# Patient Record
Sex: FEMALE | Race: BLACK OR AFRICAN AMERICAN | Employment: OTHER | URBAN - METROPOLITAN AREA
[De-identification: names, ages, dates, MRNs, and addresses within clinical notes are randomized per-mention and may not be internally consistent; named-entity substitution may affect disease eponyms.]

---

## 2017-02-14 RX ORDER — ROSUVASTATIN CALCIUM 5 MG
TABLET ORAL
Qty: 90 TAB | Refills: 3 | Status: SHIPPED | OUTPATIENT
Start: 2017-02-14 | End: 2017-10-18 | Stop reason: SDUPTHER

## 2017-03-03 ENCOUNTER — OFFICE VISIT (OUTPATIENT)
Dept: FAMILY MEDICINE CLINIC | Age: 82
End: 2017-03-03

## 2017-03-03 ENCOUNTER — HOSPITAL ENCOUNTER (OUTPATIENT)
Dept: LAB | Age: 82
Discharge: HOME | End: 2017-03-03
Payer: MEDICARE

## 2017-03-03 VITALS
WEIGHT: 154 LBS | BODY MASS INDEX: 28.34 KG/M2 | HEART RATE: 52 BPM | HEIGHT: 62 IN | TEMPERATURE: 97.2 F | SYSTOLIC BLOOD PRESSURE: 137 MMHG | OXYGEN SATURATION: 98 % | RESPIRATION RATE: 16 BRPM | DIASTOLIC BLOOD PRESSURE: 60 MMHG

## 2017-03-03 DIAGNOSIS — E11.9 TYPE 2 DIABETES MELLITUS WITHOUT COMPLICATION, WITHOUT LONG-TERM CURRENT USE OF INSULIN (HCC): ICD-10-CM

## 2017-03-03 DIAGNOSIS — M54.32 BILATERAL SCIATICA: ICD-10-CM

## 2017-03-03 DIAGNOSIS — N64.59 ENGORGEMENT OF BREAST: ICD-10-CM

## 2017-03-03 DIAGNOSIS — Z99.89 OSA ON CPAP: ICD-10-CM

## 2017-03-03 DIAGNOSIS — I10 ESSENTIAL HYPERTENSION, MALIGNANT: Primary | ICD-10-CM

## 2017-03-03 DIAGNOSIS — E78.2 MIXED HYPERLIPIDEMIA: ICD-10-CM

## 2017-03-03 DIAGNOSIS — Z51.81 ENCOUNTER FOR MEDICATION MONITORING: ICD-10-CM

## 2017-03-03 DIAGNOSIS — R14.0 ABDOMINAL BLOATING: ICD-10-CM

## 2017-03-03 DIAGNOSIS — D64.9 CHRONIC ANEMIA: ICD-10-CM

## 2017-03-03 DIAGNOSIS — G47.33 OSA ON CPAP: ICD-10-CM

## 2017-03-03 DIAGNOSIS — M54.31 BILATERAL SCIATICA: ICD-10-CM

## 2017-03-03 LAB
GLUCOSE POC: 144 MG/DL
HBA1C MFR BLD HPLC: 6.6 %

## 2017-03-03 PROCEDURE — 36415 COLL VENOUS BLD VENIPUNCTURE: CPT

## 2017-03-03 PROCEDURE — 80061 LIPID PANEL: CPT

## 2017-03-03 PROCEDURE — 80053 COMPREHEN METABOLIC PANEL: CPT

## 2017-03-03 RX ORDER — PREDNISONE 10 MG/1
10 TABLET ORAL 2 TIMES DAILY
Qty: 10 TAB | Refills: 0 | Status: SHIPPED | OUTPATIENT
Start: 2017-03-03 | End: 2017-03-08

## 2017-03-03 RX ORDER — METOPROLOL TARTRATE 100 MG/1
TABLET ORAL
Refills: 3 | COMMUNITY
Start: 2016-12-21 | End: 2017-10-17 | Stop reason: SDUPTHER

## 2017-03-03 RX ORDER — TRAMADOL HYDROCHLORIDE 50 MG/1
50 TABLET ORAL
Qty: 30 TAB | Refills: 1 | Status: SHIPPED | OUTPATIENT
Start: 2017-03-03 | End: 2019-08-13

## 2017-03-03 NOTE — PROGRESS NOTES
Chief Complaint   Patient presents with    Hypertension     follow up    Diabetes     follow up         BMP 10/28/2016    A1C 10/28/2016    Lipid 6/27/16    Microalbumin 10/28/2016     Mammogram 10/20/2016    Colonoscopy 5/2/2016 by Dr. Jonathan Bonilla- repeat in 2 years    Eye exam was 5/25/2016 with Dr. Marli García at [facility].

## 2017-03-03 NOTE — MR AVS SNAPSHOT
Visit Information Date & Time Provider Department Dept. Phone Encounter #  
 3/3/2017 10:00 AM Iván Bhardwaj MD [facility] [phone] [id number] Follow-up Instructions Return in about 3 months (around 6/3/2017). Upcoming Health Maintenance Date Due  
 MEDICARE YEARLY EXAM 10/28/2016 FOOT EXAM Q1 10/28/2016 HEMOGLOBIN A1C Q6M 4/28/2017 EYE EXAM RETINAL OR DILATED Q1 5/25/2017 LIPID PANEL Q1 6/27/2017 MICROALBUMIN Q1 10/28/2017 COLONOSCOPY 5/4/2018 GLAUCOMA SCREENING Q2Y 6/30/2018 DTaP/Tdap/Td series (2 - Td) 5/1/2025 Allergies as of 3/3/2017  Review Complete On: 3/3/2017 By: Iván Bhardwaj MD  
  
 Severity Noted Reaction Type Reactions Pcn [Penicillins] High 06/01/2010    Swelling  
 tongue Bactrim [Sulfamethoxazole-trimethoprim]  06/01/2010    Diarrhea Ceftin [Cefuroxime Axetil]  06/01/2010    Diarrhea Hydralazine  07/11/2012   Intolerance Other (comments) edema Sulfa (Sulfonamide Antibiotics)  06/01/2010    Itching Current Immunizations  Reviewed on 3/3/2017 Name Date Influenza High Dose Vaccine PF 10/28/2016, 11/5/2015 Influenza Vaccine 10/22/2014, 11/1/2013 Influenza Vaccine Split 9/12/2012, 9/13/2011, 12/7/2010 Pneumococcal Conjugate (PCV-13) 5/1/2015 Pneumococcal Vaccine (Pcv) 9/21/2009 Tdap 5/1/2015 Reviewed by Iván Bhardwaj MD on 3/3/2017 at 10:40 AM  
You Were Diagnosed With   
  
 Codes Comments Type 2 diabetes mellitus without complication, without long-term current use of insulin (HCC)    -  Primary ICD-10-CM: E11.9 ICD-9-CM: 250.00 Essential hypertension, malignant     ICD-10-CM: I10 
ICD-9-CM: 401.0 Mixed hyperlipidemia     ICD-10-CM: E78.2 ICD-9-CM: 272.2 ELEAZAR on CPAP     ICD-10-CM: G47.33 
ICD-9-CM: 327.23 Chronic anemia     ICD-10-CM: D64.9 ICD-9-CM: 285.9  Encounter for medication monitoring     ICD-10-CM: Z51.81 
 ICD-9-CM: V58.83 Normal body mass index (BMI)     ICD-10-CM: Z78.9 ICD-9-CM: V49.89 Bilateral sciatica     ICD-10-CM: M54.31, M54.32 
ICD-9-CM: 724.3 Engorgement of breast     ICD-10-CM: N64.59 
ICD-9-CM: 611.79 Abdominal bloating     ICD-10-CM: R14.0 ICD-9-CM: 766. 3 Vitals BP  
  
  
  
  
  
 137/60 Vitals History BMI and BSA Data Body Mass Index Body Surface Area  
 28.17 kg/m 2 1.75 m 2 Preferred Pharmacy Pharmacy Name Phone [facility] [id number] [phone] Your Updated Medication List  
  
   
This list is accurate as of: 3/3/17 11:22 AM.  Always use your most recent med list.  
  
  
  
  
 allopurinol 100 mg tablet Commonly known as:  ZYLOPRIM  
TAKE ONE (1) TABLET(S) ONCE DAILY  
  
 amLODIPine 10 mg tablet Commonly known as:  Chowdhury Fanti TAKE ONE (1) TABLET(S) ONCE DAILY  
  
 chlorthalidone 25 mg tablet Commonly known as:  Chales Chamber Take 25 mg by mouth two (2) times a day. cloNIDine HCl 0.3 mg tablet Commonly known as:  CATAPRES  
TAKE ONE HALF (1/2) TABLET( S) DAILY AFTER LUNCH AND T LOLLY 1 TABLET EVERY NIGHT AT BEDTIME  
  
 clotrimazole-betamethasone topical cream  
Commonly known as:  Benetta Euler Apply  to affected area two (2) times a day. CRESTOR 5 mg tablet Generic drug:  rosuvastatin TAKE ONE (1) TABLET(S) BY MOUTH N IGHTLY. FISH OIL 1,000 mg Cap Generic drug:  omega-3 fatty acids-vitamin e Take 1 Tab by mouth two (2) times a day. fluticasone 50 mcg/actuation nasal spray Commonly known as:  Cody Cyphers 2 Sprays by Nasal route daily. Administer to right and left nostril.  
  
 metFORMIN  mg tablet Commonly known as:  GLUCOPHAGE XR Take 500 mg by mouth daily (with dinner). metoprolol tartrate 100 mg IR tablet Commonly known as:  LOPRESSOR  
TAKE ONE TABLET BY MOUTH TWICE A DAY  
  
 minoxidil 10 mg tablet Commonly known as:  Jeneane Misty  
 Take 5 mg by mouth daily. mometasone 0.1 % topical cream  
Commonly known as:  Sharin Tangela Apply  to affected area daily. mupirocin calcium 2 % topical cream  
Commonly known as:  [employer] Apply  to affected area two (2) times a day. polyethylene glycol 17 gram packet Commonly known as:  Ronne Diana Take 17 g by mouth daily as needed. predniSONE 10 mg tablet Commonly known as:  Tim Felling Take 1 Tab by mouth two (2) times a day for 5 days. PROCRIT INJECTION  
1,000 Units by Injection route as needed. Depending on blood count  
  
 spironolactone 25 mg tablet Commonly known as:  ALDACTONE Take 25 mg by mouth two (2) times a day. STOOL SOFTENER-LAXATIVE PO Take 1 Tab by mouth daily. traMADol 50 mg tablet Commonly known as:  ULTRAM  
Take 1 Tab by mouth every six (6) hours as needed for Pain. Max Daily Amount: 200 mg. VITAMIN B-12 100 mcg tablet Generic drug:  cyanocobalamin Take 100 mcg by mouth daily. VITAMIN C 500 mg tablet Generic drug:  ascorbic acid (vitamin C) Take 500 mg by mouth two (2) times a day. VITAMIN D3 1,000 unit tablet Generic drug:  cholecalciferol Take 1,000 Units by mouth daily. Prescriptions Printed Refills  
 traMADol (ULTRAM) 50 mg tablet 1 Sig: Take 1 Tab by mouth every six (6) hours as needed for Pain. Max Daily Amount: 200 mg. Class: Print Route: Oral  
  
Prescriptions Sent to Pharmacy Refills  
 predniSONE (DELTASONE) 10 mg tablet 0 Sig: Take 1 Tab by mouth two (2) times a day for 5 days. Class: Normal  
 Pharmacy: [facility] [place] [address], [address] Ph #: [phone] Route: Oral  
  
We Performed the Following AMB POC COMPLETE CBC, AUTOMATED [98135 CPT(R)] AMB POC GLUCOSE, QUANTITATIVE, BLOOD [63918 CPT(R)] AMB POC HEMOGLOBIN A1C [95305 CPT(R)] LIPID PANEL [67091 CPT(R)] METABOLIC PANEL, COMPREHENSIVE [07906 CPT(R)] REFERRAL TO ORTHOPEDIC SURGERY [REF62 Custom] Follow-up Instructions Return in about 3 months (around 6/3/2017). To-Do List   
 03/03/2017 Imaging:  RUPERTO MAMMO LT DX INCL CAD   
  
 03/03/2017 Imaging:  US ABD COMP   
  
 03/03/2017 Imaging:  US BREAST LT LIMITED=<3 QUAD   
  
 03/03/2017 Imaging:  XR SPINE LUMB 2 OR 3 V Referral Information Referral ID Referred By Referred To  
  
 [id number] Laura Lewis MD   
   [address] [facility], [address] Phone: [phone] Fax: [phone] Visits Status Start Date End Date 1 Authorized 3/3/17 3/3/18 If your referral has a status of pending review or denied, additional information will be sent to support the outcome of this decision. Introducing [facility]! Elle Zazueta introduces [employer] patient portal. Now you can access parts of your medical record, email your doctor's office, and request medication refills online. 1. In your internet browser, go to [email or web address] 2. Click on the First Time User? Click Here link in the Sign In box. You will see the New Member Sign Up page. 3. Enter your [employer] Access Code exactly as it appears below. You will not need to use this code after youve completed the sign-up process. If you do not sign up before the expiration date, you must request a new code. · [employer] Access Code: [place] Expires: 6/1/2017 11:22 AM 
 
4. Enter the last four digits of your Social Security Number (xxxx) and Date of Birth (mm/dd/yyyy) as indicated and click Submit. You will be taken to the next sign-up page. 5. Create a OnForce ID. This will be your [employer] login ID and cannot be changed, so think of one that is secure and easy to remember. 6. Create a [employer] password. You can change your password at any time. 7. Enter your Password Reset Question and Answer.  This can be used at a later time if you forget your password. 8. Enter your e-mail address. You will receive e-mail notification when new information is available in [address] 9. Click Sign Up. You can now view and download portions of your medical record. 10. Click the Download Summary menu link to download a portable copy of your medical information. If you have questions, please visit the Frequently Asked Questions section of the [employer] website. Remember, [employer] is NOT to be used for urgent needs. For medical emergencies, dial 911. Now available from your iPhone and Android! Please provide this summary of care documentation to your next provider. Your primary care clinician is listed as Clarice Pearson. If you have any questions after today's visit, please call [phone].

## 2017-03-04 LAB
ALBUMIN SERPL-MCNC: 4.5 G/DL (ref 3.5–4.7)
ALBUMIN/GLOB SERPL: 0.9 {RATIO} (ref 1.1–2.5)
ALP SERPL-CCNC: 51 IU/L (ref 39–117)
ALT SERPL-CCNC: 9 IU/L (ref 0–32)
AST SERPL-CCNC: 11 IU/L (ref 0–40)
BILIRUB SERPL-MCNC: 0.4 MG/DL (ref 0–1.2)
BUN SERPL-MCNC: 22 MG/DL (ref 8–27)
BUN/CREAT SERPL: 16 (ref 11–26)
CALCIUM SERPL-MCNC: 9.9 MG/DL (ref 8.7–10.3)
CHLORIDE SERPL-SCNC: 101 MMOL/L (ref 96–106)
CHOLEST SERPL-MCNC: 130 MG/DL (ref 100–199)
CO2 SERPL-SCNC: 19 MMOL/L (ref 18–29)
CREAT SERPL-MCNC: 1.39 MG/DL (ref 0.57–1)
GLOBULIN SER CALC-MCNC: 4.8 G/DL (ref 1.5–4.5)
GLUCOSE SERPL-MCNC: 131 MG/DL (ref 65–99)
HDLC SERPL-MCNC: 36 MG/DL
INTERPRETATION, 910389: NORMAL
INTERPRETATION: NORMAL
LDLC SERPL CALC-MCNC: 67 MG/DL (ref 0–99)
PDF IMAGE, 910387: NORMAL
POTASSIUM SERPL-SCNC: 4.7 MMOL/L (ref 3.5–5.2)
PROT SERPL-MCNC: 9.3 G/DL (ref 6–8.5)
SODIUM SERPL-SCNC: 134 MMOL/L (ref 134–144)
TRIGL SERPL-MCNC: 134 MG/DL (ref 0–149)
VLDLC SERPL CALC-MCNC: 27 MG/DL (ref 5–40)

## 2017-03-06 NOTE — PROGRESS NOTES
HISTORY OF PRESENT ILLNESS  Malgorzata Emmanuel is a 80 y.o. female. HPI   Follow up on chronic medical problems. Cardiovascular Review:  The patient has hypertension, ELEAZAR and hyperlipidemia. Diet and Lifestyle: generally follows a low fat low cholesterol diet, generally follows a low sodium diet, follows a diabetic diet regularly  Home BP Monitoring: is well controlled at home, ranging 130s's/80's. Continues to see Dr. Jaciel Butler at [facility] for BP. Pertinent ROS: taking medications as instructed, no medication side effects noted, no TIA's, no chest pain on exertion, no dyspnea on exertion, no swelling of ankles. Diabetes Mellitus:  She has diabetes mellitus. Diabetic ROS - diabetic diet compliance: compliant most of the time, home glucose monitoring: is performed regularly, fasting values range low 100s,  Pt does not have BS log with her today, further diabetic ROS: no polyuria or polydipsia, no chest pain, dyspnea or TIA's, no numbness, tingling or pain in extremities, no unusual visual symptoms, no hypoglycemia. Lab review: orders written for new lab studies as appropriate; see orders. Chronic anemia and multiple myeloma in remission is being followed by hematology. C/o abd bloating. Feels like stomach gets swollen. No abd pain. No nausea or vomiting. BMs normal.  Has not noticed any food trigger. Sometime stomach is so bloated that he can not fasten her pants. Has had sx over the past month. C/o low back pain for the past few weeks. Sx comes and goes. Has radiation of pain down both legs. Has had sciatica in the past but seems worse over the last few weeks. No new injury noted. Pain is 5-6/10 when at its worse. Has only been taking occassional aleve for the pain which does help. C/o left breast enlargement over the past month. Noticed d/t her bra fitting differently. No pain and has not felt any lumps. Just feel uncomfortable.         Patient Active Problem List   Diagnosis Code    Constipation K59.00    Gout M10.9    Spinal stenosis M48.00    Chronic anemia D64.9    Multiple myeloma, without mention of having achieved remission ([facility]) C90.00    Benign neoplasm of adrenal gland D35.00    Essential hypertension, malignant I10    Mixed hyperlipidemia E78.2    ELEAZAR on CPAP G47.33    Encounter for medication monitoring Z51.81    Type 2 diabetes mellitus without complication ([facility]) J64.6       Current Outpatient Prescriptions   Medication Sig Dispense Refill    metoprolol tartrate (LOPRESSOR) 100 mg IR tablet TAKE ONE TABLET BY MOUTH TWICE A DAY  3    traMADol (ULTRAM) 50 mg tablet Take 1 Tab by mouth every six (6) hours as needed for Pain. Max Daily Amount: 200 mg. 30 Tab 1    predniSONE (DELTASONE) 10 mg tablet Take 1 Tab by mouth two (2) times a day for 5 days. 10 Tab 0    CRESTOR 5 mg tablet TAKE ONE (1) TABLET(S) BY MOUTH N IGHTLY. 90 Tab 3    minoxidil (LONITEN) 10 mg tablet Take 5 mg by mouth daily.  metFORMIN ER (GLUCOPHAGE XR) 500 mg tablet Take 500 mg by mouth daily (with dinner).  cloNIDine HCl (CATAPRES) 0.3 mg tablet TAKE ONE HALF (1/2) TABLET( S) DAILY AFTER LUNCH AND T LOLLY 1 TABLET EVERY NIGHT AT BEDTIME 135 Tab 3    amLODIPine (NORVASC) 10 mg tablet TAKE ONE (1) TABLET(S) ONCE DAILY 90 Tab 3    allopurinol (ZYLOPRIM) 100 mg tablet TAKE ONE (1) TABLET(S) ONCE DAILY 90 Tab 3    polyethylene glycol (MIRALAX) 17 gram packet Take 17 g by mouth daily as needed.  cyanocobalamin (VITAMIN B-12) 100 mcg tablet Take 100 mcg by mouth daily.  EPOETIN BRYAN (PROCRIT IJ) 1,000 Units by Injection route as needed. Depending on blood count      mupirocin calcium (BACTROBAN) 2 % topical cream Apply  to affected area two (2) times a day. 30 g 0    clotrimazole-betamethasone (LOTRISONE) topical cream Apply  to affected area two (2) times a day. 45 g 1    spironolactone (ALDACTONE) 25 mg tablet Take 25 mg by mouth two (2) times a day.       chlorthalidone (HYGROTEN) 25 mg tablet Take 25 mg by mouth two (2) times a day.  mometasone (ELOCON) 0.1 % topical cream Apply  to affected area daily. 15 g 1    SENNOSIDES/DOCUSATE SODIUM (STOOL SOFTENER-LAXATIVE PO) Take 1 Tab by mouth daily.  fluticasone (FLONASE) 50 mcg/Actuation nasal spray 2 Sprays by Nasal route daily. Administer to right and left nostril. 1 Bottle 6    omega-3 fatty acids-vitamin e (FISH OIL) 1,000 mg Cap Take 1 Tab by mouth two (2) times a day.  ascorbic acid (VITAMIN C) 500 mg tablet Take 500 mg by mouth two (2) times a day.  cholecalciferol, vitamin d3, (VITAMIN D) 1,000 unit tablet Take 1,000 Units by mouth daily. Allergies   Allergen Reactions    Pcn [Penicillins] Swelling     tongue    Bactrim [Sulfamethoxazole-Trimethoprim] Diarrhea    Ceftin [Cefuroxime Axetil] Diarrhea    Hydralazine Other (comments)     edema    Sulfa (Sulfonamide Antibiotics) Itching       Past Medical History:   Diagnosis Date    Anemia NEC     Benign neoplasm of adrenal gland     Chronic anemia 6/1/2010    Constipation     on a daily stool softener which pt states helps as of 4/27/16    Diabetes ([address]     WALKER (dyspnea on exertion)     as of 4/27/16 pt states this is her baseline and is not getting any worse    Goiter 2011    as of 4/27/16 Pt states \"I am not even sure I have it\".   Pt denies any problems    Gout 6/1/2010    as of 4/27/16 pt denies any sx    HTN (hypertension) 6/1/2010    followed by cardiology Dr Haley Cedeno  [phone]    Multiple myeloma, without mention of having achieved remission ([address] Dx 11/1/99    followed by Levon Workman    Pure hypercholesterolemia 6/1/2010    Sleep apnea 6/1/2010    CPAP    Spinal stenosis 6/1/2010       Past Surgical History:   Procedure Laterality Date    HX HYSTERECTOMY  1970s    HX ORTHOPAEDIC  2005    left hand-trigger finger    HX ORTHOPAEDIC  2007    right hand- trigger finger    MT COLONOSCOPY FLX DX W/COLLJ SPEC WHEN PFRMD  08/10/2010 Alejandro Morris       Family History   Problem Relation Age of Onset    Hypertension Mother     No Known Problems Father     Cancer Sister      colon     Diabetes Sister     Kidney Disease Sister     Hypertension Sister     Elevated Lipids Sister        Social History   Substance Use Topics    Smoking status: Former Smoker     Quit date: 1/1/1980    Smokeless tobacco: Never Used    Alcohol use No        Lab Results  Component Value Date/Time   WBC 5.6 05/01/2015 09:05 AM   WBC 4.9 06/12/2012 09:40 AM   HGB 10.3 05/01/2015 09:05 AM   HCT 32.9 05/01/2015 09:05 AM   PLATELET 177 32/32/2500 09:05 AM   MCV 89 05/01/2015 09:05 AM       Lab Results  Component Value Date/Time   Cholesterol, total 130 03/03/2017 10:58 AM   HDL Cholesterol 36 03/03/2017 10:58 AM   LDL, calculated 67 03/03/2017 10:58 AM   Triglyceride 134 03/03/2017 10:58 AM   CHOL/HDL Ratio 2.8 06/02/2010 09:57 AM       Lab Results  Component Value Date/Time   TSH 2.860 05/11/2011 09:29 AM      Lab Results   Component Value Date/Time    Sodium 134 03/03/2017 10:58 AM    Potassium 4.7 03/03/2017 10:58 AM    Chloride 101 03/03/2017 10:58 AM    CO2 19 03/03/2017 10:58 AM    Anion gap 9 02/26/2010 04:04 PM    Glucose 131 03/03/2017 10:58 AM    BUN 22 03/03/2017 10:58 AM    Creatinine 1.39 03/03/2017 10:58 AM    BUN/Creatinine ratio 16 03/03/2017 10:58 AM    GFR est AA 41 03/03/2017 10:58 AM    GFR est non-AA 35 03/03/2017 10:58 AM    Calcium 9.9 03/03/2017 10:58 AM    Bilirubin, total 0.4 03/03/2017 10:58 AM    ALT (SGPT) 9 03/03/2017 10:58 AM    AST (SGOT) 11 03/03/2017 10:58 AM    Alk.  phosphatase 51 03/03/2017 10:58 AM    Protein, total 9.3 03/03/2017 10:58 AM    Albumin 4.5 03/03/2017 10:58 AM    Globulin 5.6 06/02/2010 09:57 AM    A-G Ratio 0.9 03/03/2017 10:58 AM      Lab Results   Component Value Date/Time    Hemoglobin A1c 7.4 10/28/2016 10:49 AM    Hemoglobin A1c (POC) 6.6 03/03/2017 10:58 AM    Hemoglobin A1c, External 6.4 10/14/2015         Review of Systems   Constitutional: Negative for malaise/fatigue. HENT: Negative for congestion. Eyes: Negative for blurred vision. Respiratory: Negative for cough and shortness of breath. Cardiovascular: Negative for chest pain, palpitations and leg swelling. Gastrointestinal: Negative for abdominal pain, constipation and heartburn. Genitourinary: Negative for dysuria, frequency and urgency. Musculoskeletal: Positive for back pain. Negative for joint pain. Neurological: Negative for dizziness, tingling and headaches. Endo/Heme/Allergies: Negative for environmental allergies. Psychiatric/Behavioral: Negative for depression. The patient does not have insomnia. Physical Exam   Constitutional: She appears well-developed and well-nourished. /60  Pulse (!) 52  Temp 97.2 °F (36.2 °C) (Oral)   Resp 16  Ht 5' 2\" (1.575 m)  Wt 154 lb (69.9 kg)  SpO2 98%  BMI 28.17 kg/m2     HENT:   Right Ear: Tympanic membrane and ear canal normal.   Left Ear: Tympanic membrane and ear canal normal.   Nose: No mucosal edema or rhinorrhea. Mouth/Throat: Oropharynx is clear and moist and mucous membranes are normal.   Neck: Normal range of motion. Neck supple. No thyromegaly present. Cardiovascular: Normal rate and regular rhythm. No murmur heard. Pulmonary/Chest: Effort normal and breath sounds normal. Right breast exhibits no inverted nipple, no mass, no nipple discharge, no skin change and no tenderness (left breast is larger than right. ). Left breast exhibits no inverted nipple, no mass, no nipple discharge, no skin change and no tenderness. Breasts are asymmetrical.   Abdominal: Soft. Normal appearance and bowel sounds are normal. She exhibits no distension and no ascites. There is no hepatosplenomegaly. There is no tenderness. There is no rigidity, no rebound and no guarding. No hernia. Musculoskeletal: Normal range of motion. She exhibits no edema.         Lumbar back: She exhibits tenderness and bony tenderness. She exhibits normal range of motion, no pain and no spasm. Lymphadenopathy:     She has no cervical adenopathy. She has no axillary adenopathy. Neurological: She has normal strength and normal reflexes. No sensory deficit. Coordination and gait normal.   Skin: Skin is warm and dry. Psychiatric: She has a normal mood and affect. Nursing note and vitals reviewed. ASSESSMENT and Juan Francisco Hunter was seen today for hypertension and diabetes. Diagnoses and all orders for this visit:    Essential hypertension, malignant  Stable     Type 2 diabetes mellitus without complication, without long-term current use of insulin (HCC)  -     AMB POC GLUCOSE, QUANTITATIVE, BLOOD  -     AMB POC HEMOGLOBIN A1C    Mixed hyperlipidemia  -     LIPID PANEL    ELEAZAR on CPAP    Chronic anemia  -     AMB POC COMPLETE CBC, AUTOMATED    Bilateral sciatica  -     REFERRAL TO ORTHOPEDIC SURGERY  -     XR SPINE LUMBAR 2 OR 3 VIEWS; Future  -     traMADol (ULTRAM) 50 mg tablet; Take 1 Tab by mouth every six (6) hours as needed for Pain. Max Daily Amount: 200 mg.  -     predniSONE (DELTASONE) 10 mg tablet; Take 1 Tab by mouth two (2) times a day for 5 days. Engorgement of breast  -     RUPERTO MAMMO LT DX INCL CAD; Future  -     US BREAST LT LIMITED=<3 QUAD; Future    Abdominal bloating  -     US ABD COMP; Future    Encounter for medication monitoring  -     METABOLIC PANEL, COMPREHENSIVE    Normal body mass index (BMI)        Follow-up Disposition:  Return in about 3 months (around 6/3/2017).   reviewed diet, exercise and weight control  cardiovascular risk and specific lipid/LDL goals reviewed  reviewed medications and side effects in detail  specific diabetic recommendations: low cholesterol diet, weight control and daily exercise discussed, home glucose monitoring emphasized, foot care discussed and Podiatry visits discussed, annual eye examinations at Ophthalmology discussed and glycohemoglobin and other lab monitoring discussed     I have discussed diagnosis listed in this note with pt and/or family. I have discussed treatment plans and options and the risk/benefit analysis of those options, including safe use of medications and possible medication side effects. Through the use of shared decision making we have agreed to the above plan. The patient has received an after-visit summary and questions were answered concerning future plans and follow up. Advise pt of any urgent changes then to proceed to the ER.

## 2017-03-15 RX ORDER — ALLOPURINOL 100 MG/1
TABLET ORAL
Qty: 90 TAB | Refills: 3 | Status: SHIPPED | OUTPATIENT
Start: 2017-03-15 | End: 2017-12-06 | Stop reason: SDUPTHER

## 2017-03-20 ENCOUNTER — HOSPITAL ENCOUNTER (OUTPATIENT)
Dept: ULTRASOUND IMAGING | Age: 82
Discharge: HOME | End: 2017-03-20
Attending: FAMILY MEDICINE
Payer: MEDICARE

## 2017-03-20 ENCOUNTER — HOSPITAL ENCOUNTER (OUTPATIENT)
Dept: MAMMOGRAPHY | Age: 82
Discharge: HOME | End: 2017-03-20
Attending: FAMILY MEDICINE
Payer: MEDICARE

## 2017-03-20 ENCOUNTER — APPOINTMENT (OUTPATIENT)
Dept: ULTRASOUND IMAGING | Age: 82
End: 2017-03-20
Attending: FAMILY MEDICINE
Payer: MEDICARE

## 2017-03-20 DIAGNOSIS — N64.59 ENGORGEMENT OF BREAST: ICD-10-CM

## 2017-03-20 DIAGNOSIS — R14.0 ABDOMINAL BLOATING: ICD-10-CM

## 2017-03-20 PROCEDURE — 76700 US EXAM ABDOM COMPLETE: CPT

## 2017-03-20 PROCEDURE — 77065 DX MAMMO INCL CAD UNI: CPT

## 2017-04-03 ENCOUNTER — HOSPITAL ENCOUNTER (OUTPATIENT)
Dept: GENERAL RADIOLOGY | Age: 82
Discharge: HOME | End: 2017-04-03
Attending: INTERNAL MEDICINE
Payer: MEDICARE

## 2017-04-03 DIAGNOSIS — C90.00 MULTIPLE MYELOMA (HCC): ICD-10-CM

## 2017-04-03 DIAGNOSIS — D50.9 IRON DEFICIENCY ANEMIA, UNSPECIFIED: ICD-10-CM

## 2017-04-03 PROCEDURE — 77075 RADEX OSSEOUS SURVEY COMPL: CPT

## 2017-04-05 RX ORDER — AMLODIPINE BESYLATE 10 MG/1
TABLET ORAL
Qty: 90 TAB | Refills: 3 | Status: SHIPPED | OUTPATIENT
Start: 2017-04-05 | End: 2017-12-06 | Stop reason: SDUPTHER

## 2017-04-06 ENCOUNTER — TELEPHONE (OUTPATIENT)
Dept: FAMILY MEDICINE CLINIC | Age: 82
End: 2017-04-06

## 2017-05-18 ENCOUNTER — OFFICE VISIT (OUTPATIENT)
Dept: FAMILY MEDICINE CLINIC | Age: 82
End: 2017-05-18

## 2017-05-18 VITALS — BODY MASS INDEX: 27.8 KG/M2 | SYSTOLIC BLOOD PRESSURE: 136 MMHG | WEIGHT: 152 LBS | DIASTOLIC BLOOD PRESSURE: 58 MMHG

## 2017-05-18 DIAGNOSIS — Z00.00 MEDICARE ANNUAL WELLNESS VISIT, SUBSEQUENT: Primary | ICD-10-CM

## 2017-05-18 DIAGNOSIS — Z13.31 SCREENING FOR DEPRESSION: ICD-10-CM

## 2017-05-18 DIAGNOSIS — I10 ESSENTIAL HYPERTENSION, MALIGNANT: ICD-10-CM

## 2017-05-18 DIAGNOSIS — E78.2 MIXED HYPERLIPIDEMIA: ICD-10-CM

## 2017-05-18 DIAGNOSIS — E11.9 TYPE 2 DIABETES MELLITUS WITHOUT COMPLICATION, WITHOUT LONG-TERM CURRENT USE OF INSULIN (HCC): ICD-10-CM

## 2017-05-18 DIAGNOSIS — D64.9 CHRONIC ANEMIA: ICD-10-CM

## 2017-05-18 NOTE — PATIENT INSTRUCTIONS
Repeat mammogram in October 2017     Keep eye appt, retinal eye exam on 6/9 and have records sent to us    Colonoscopy due 5/2018 for follow up on polyp removal     Make sure that you are drinking plenty of water (approx 36-48 oz) each day     Please review and discuss advanced care plan with prospective agent and bring back the completed form to our office. Contact us with any questions about the form if you have questions.

## 2017-05-18 NOTE — MR AVS SNAPSHOT
Visit Information Date & Time Provider Department Dept. Phone Encounter #  
 5/18/2017  8:00 AM Newton Washington NP [facility] [phone] [id number] Your Appointments 5/31/2017  9:00 AM  
ROUTINE CARE with Angeles Almaguer MD  
[facility]) Appt Note: 3m f/u  
 [address] [facility] [address] [phone] [address] [place] [address] [address] Upcoming Health Maintenance Date Due  
 MEDICARE YEARLY EXAM 10/28/2016 FOOT EXAM Q1 10/28/2016 EYE EXAM RETINAL OR DILATED Q1 5/25/2017 INFLUENZA AGE 9 TO ADULT 8/1/2017 HEMOGLOBIN A1C Q6M 9/3/2017 MICROALBUMIN Q1 10/28/2017 LIPID PANEL Q1 3/3/2018 COLONOSCOPY 5/4/2018 GLAUCOMA SCREENING Q2Y 6/30/2018 DTaP/Tdap/Td series (2 - Td) 5/1/2025 Allergies as of 5/18/2017  Review Complete On: 5/18/2017 By: Newton Wsahington NP Severity Noted Reaction Type Reactions Pcn [Penicillins] High 06/01/2010    Swelling  
 tongue Bactrim [Sulfamethoxazole-trimethoprim]  06/01/2010    Diarrhea Ceftin [Cefuroxime Axetil]  06/01/2010    Diarrhea Hydralazine  07/11/2012   Intolerance Other (comments) edema Sulfa (Sulfonamide Antibiotics)  06/01/2010    Itching Current Immunizations  Reviewed on 3/3/2017 Name Date Influenza High Dose Vaccine PF 10/28/2016, 11/5/2015 Influenza Vaccine 10/22/2014, 11/1/2013 Influenza Vaccine Split 9/12/2012, 9/13/2011, 12/7/2010 Pneumococcal Conjugate (PCV-13) 5/1/2015 Pneumococcal Vaccine (Pcv) 9/21/2009 Tdap 5/1/2015 Not reviewed this visit You Were Diagnosed With   
  
 Codes Comments Medicare annual wellness visit, subsequent    -  Primary ICD-10-CM: Z00.00 ICD-9-CM: V70.0 Essential hypertension, malignant     ICD-10-CM: I10 
ICD-9-CM: 401.0 Mixed hyperlipidemia     ICD-10-CM: E78.2 ICD-9-CM: 272.2 Type 2 diabetes mellitus without complication, without long-term current use of insulin (HCC)     ICD-10-CM: E11.9 ICD-9-CM: 250.00 Chronic anemia     ICD-10-CM: D64.9 ICD-9-CM: 809. 9 Vitals BP Weight(growth percentile) BMI OB Status Smoking Status 136/58 (BP 1 Location: Left arm, BP Patient Position: Sitting) 152 lb (68.9 kg) 27.8 kg/m2 Hysterectomy Former Smoker Vitals History BMI and BSA Data Body Mass Index Body Surface Area  
 27.8 kg/m 2 1.74 m 2 Preferred Pharmacy Pharmacy Name Phone [facility] [address], [address] [phone] Your Updated Medication List  
  
   
This list is accurate as of: 5/18/17  8:42 AM.  Always use your most recent med list.  
  
  
  
  
 allopurinol 100 mg tablet Commonly known as:  ZYLOPRIM  
TAKE ONE TABLET BY MOUTH EVERY DAY  
  
 amLODIPine 10 mg tablet Commonly known as:  Suzy Half TAKE ONE TABLET BY MOUTH ONCE EVERY DAY  
  
 chlorthalidone 25 mg tablet Commonly known as:  Jordis Blowers Take 25 mg by mouth two (2) times a day. cloNIDine HCl 0.3 mg tablet Commonly known as:  CATAPRES  
TAKE ONE HALF (1/2) TABLET( S) DAILY AFTER LUNCH AND T LOLLY 1 TABLET EVERY NIGHT AT BEDTIME  
  
 CRESTOR 5 mg tablet Generic drug:  rosuvastatin TAKE ONE (1) TABLET(S) BY MOUTH N IGHTLY. FISH OIL 1,000 mg Cap Generic drug:  omega-3 fatty acids-vitamin e Take 1 Tab by mouth two (2) times a day. fluticasone 50 mcg/actuation nasal spray Commonly known as:  Jonathan Bumpers 2 Sprays by Nasal route daily. Administer to right and left nostril.  
  
 metFORMIN  mg tablet Commonly known as:  GLUCOPHAGE XR Take 500 mg by mouth daily (with dinner). metoprolol tartrate 100 mg IR tablet Commonly known as:  LOPRESSOR  
TAKE ONE TABLET BY MOUTH TWICE A DAY  
  
 minoxidil 10 mg tablet Commonly known as:  Jolan Nay Take 5 mg by mouth daily. polyethylene glycol 17 gram packet Commonly known as:  Bontorito Amass Take 17 g by mouth daily as needed. PROCRIT INJECTION  
1,000 Units by Injection route as needed. Depending on blood count  
  
 spironolactone 25 mg tablet Commonly known as:  ALDACTONE Take 25 mg by mouth two (2) times a day. traMADol 50 mg tablet Commonly known as:  ULTRAM  
Take 1 Tab by mouth every six (6) hours as needed for Pain. Max Daily Amount: 200 mg. VITAMIN B-12 100 mcg tablet Generic drug:  cyanocobalamin Take 100 mcg by mouth daily. VITAMIN C 500 mg tablet Generic drug:  ascorbic acid (vitamin C) Take 500 mg by mouth two (2) times a day. VITAMIN D3 1,000 unit tablet Generic drug:  cholecalciferol Take 1,000 Units by mouth daily. Patient Instructions Repeat mammogram in October 2017 Keep eye appt, retinal eye exam on 6/9 and have records sent to us Colonoscopy due 5/2018 for follow up on polyp removal  
 
Make sure that you are drinking plenty of water (approx 36-48 oz) each day Introducing [facility] & HEALTH [facility]! [facility] introduces amSTATZ patient portal. Now you can access parts of your medical record, email your doctor's office, and request medication refills online. 1. In your internet browser, go to [email or web address] 2. Click on the First Time User? Click Here link in the Sign In box. You will see the New Member Sign Up page. 3. Enter your amSTATZ Access Code exactly as it appears below. You will not need to use this code after youve completed the sign-up process. If you do not sign up before the expiration date, you must request a new code. · amSTATZ Access Code: Rock Stone Expires: 6/1/2017 12:22 PM 
 
4. Enter the last four digits of your Social Security Number (xxxx) and Date of Birth (mm/dd/yyyy) as indicated and click Submit. You will be taken to the next sign-up page. 5. Create a Wicron ID. This will be your Wicron login ID and cannot be changed, so think of one that is secure and easy to remember. 6. Create a Wicron password. You can change your password at any time. 7. Enter your Password Reset Question and Answer. This can be used at a later time if you forget your password. 8. Enter your e-mail address. You will receive e-mail notification when new information is available in [address] 9. Click Sign Up. You can now view and download portions of your medical record. 10. Click the Download Summary menu link to download a portable copy of your medical information. If you have questions, please visit the Frequently Asked Questions section of the Wicron website. Remember, Wicron is NOT to be used for urgent needs. For medical emergencies, dial 911. Now available from your iPhone and Android! Please provide this summary of care documentation to your next provider. Your primary care clinician is listed as Donald Dickson. If you have any questions after today's visit, please call [phone].

## 2017-05-18 NOTE — Clinical Note
Thank you for the opportunity to see Ms. Amaya for her annual medicare wellness visit. She is generally up to date on her HM, but is due for her foot exam at next office visit 5/31/17. See my note for additional recommendations. Thanks!

## 2017-05-18 NOTE — ACP (ADVANCE CARE PLANNING)
Advance Care Planning (ACP) Provider Conversation Snapshot    Date of ACP Conversation: 05/18/17  Persons included in Conversation:  patient  Length of ACP Conversation in minutes:  <16 minutes (Non-Billable)    Authorized Decision Maker (if patient is incapable of making informed decisions):    This person is:   N/A          For Patients with Decision Making Capacity:   Values/Goals: Exploration of values, goals, and preferences if recovery is not expected, even with continued medical treatment in the event of:  Imminent death  Severe, permanent brain injury    Conversation Outcomes / Follow-Up Plan:   Recommended completion of Advance Directive form after review of ACP materials and conversation with prospective healthcare agent

## 2017-05-18 NOTE — PROGRESS NOTES
Carmen Blancas is a 80 y.o. female and presents for annual Medicare Wellness Visit. She is feeling well today     Had mammogram in 3/2017 because felt left breast was becoming larger. Mammo showed no mammographic evidence for left malignancy. Bilateral screening mammogram followup in October 2017 advised. Problem List: Reviewed with patient and discussed risk factors.     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    Essential hypertension, malignant I10    Mixed hyperlipidemia E78.2    ELEAZAR on CPAP G47.33, Z99.89    Encounter for medication monitoring Z51.81    Type 2 diabetes mellitus without complication ([facility] [address] C78.8       Current medical providers:  Patient Care Team:  Carla Hall MD as PCP - Yael Gallo MD (Nephrology)  Rosalia Mosqueda MD (Hematology and Oncology)  Janine Whittaker MD (Gastroenterology)    PSH: Reviewed with patient  Past Surgical History:   Procedure Laterality Date    HX HYSTERECTOMY  1970s    HX ORTHOPAEDIC  2005    left hand-trigger finger    HX ORTHOPAEDIC  2007    right hand- trigger finger    MT COLONOSCOPY FLX DX W/COLLJ SPEC WHEN PFRMD  08/10/2010    Aury Shaw        SH: Reviewed with patient  Social History   Substance Use Topics    Smoking status: Former Smoker     Quit date: 1/1/1980    Smokeless tobacco: Never Used    Alcohol use No     History   Alcohol Use No       FH: Reviewed with patient  Family History   Problem Relation Age of Onset    Hypertension Mother     No Known Problems Father     Cancer Sister 52     colon     Diabetes Sister     Kidney Disease Sister     Hypertension Sister     Elevated Lipids Sister        Medications/Allergies: Reviewed with patient  Current Outpatient Prescriptions on File Prior to Visit Medication Sig Dispense Refill    amLODIPine (NORVASC) 10 mg tablet TAKE ONE TABLET BY MOUTH ONCE EVERY DAY 90 Tab 3    allopurinol (ZYLOPRIM) 100 mg tablet TAKE ONE TABLET BY MOUTH EVERY DAY 90 Tab 3    metoprolol tartrate (LOPRESSOR) 100 mg IR tablet TAKE ONE TABLET BY MOUTH TWICE A DAY  3    CRESTOR 5 mg tablet TAKE ONE (1) TABLET(S) BY MOUTH N IGHTLY. 90 Tab 3    minoxidil (LONITEN) 10 mg tablet Take 5 mg by mouth daily.  metFORMIN ER (GLUCOPHAGE XR) 500 mg tablet Take 500 mg by mouth daily (with dinner).  cloNIDine HCl (CATAPRES) 0.3 mg tablet TAKE ONE HALF (1/2) TABLET( S) DAILY AFTER LUNCH AND T LOLLY 1 TABLET EVERY NIGHT AT BEDTIME 135 Tab 3    polyethylene glycol (MIRALAX) 17 gram packet Take 17 g by mouth daily as needed.  cyanocobalamin (VITAMIN B-12) 100 mcg tablet Take 100 mcg by mouth daily.  EPOETIN BRYAN (PROCRIT IJ) 1,000 Units by Injection route as needed. Depending on blood count      spironolactone (ALDACTONE) 25 mg tablet Take 25 mg by mouth two (2) times a day.  chlorthalidone (HYGROTEN) 25 mg tablet Take 25 mg by mouth two (2) times a day.  fluticasone (FLONASE) 50 mcg/Actuation nasal spray 2 Sprays by Nasal route daily. Administer to right and left nostril. 1 Bottle 6    omega-3 fatty acids-vitamin e (FISH OIL) 1,000 mg Cap Take 1 Tab by mouth two (2) times a day.  ascorbic acid (VITAMIN C) 500 mg tablet Take 500 mg by mouth two (2) times a day.  cholecalciferol, vitamin d3, (VITAMIN D) 1,000 unit tablet Take 1,000 Units by mouth daily.  traMADol (ULTRAM) 50 mg tablet Take 1 Tab by mouth every six (6) hours as needed for Pain. Max Daily Amount: 200 mg. 30 Tab 1     No current facility-administered medications on file prior to visit.        Allergies   Allergen Reactions    Pcn [Penicillins] Swelling     tongue    Bactrim [Sulfamethoxazole-Trimethoprim] Diarrhea    Ceftin [Cefuroxime Axetil] Diarrhea    Hydralazine Other (comments) edema    Sulfa (Sulfonamide Antibiotics) Itching       Objective:  Visit Vitals    /58 (BP 1 Location: Left arm, BP Patient Position: Sitting)    Wt 152 lb (68.9 kg)    BMI 27.8 kg/m2    Body mass index is 27.8 kg/(m^2). Emotional Health Questions   -Do you have a history of depression, anxiety, or emotional problems? no  -Over the past 2 weeks, have you felt down, depressed or hopeless? yes  -Over the past 2 weeks, have you felt little interest or pleasure in doing things? No    When pt feels down, she colors in coloring book or plays solitaire on her iPad- reports this helps her mood. 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 no  Do you exercise regularly? Yes, rides stationary bike everyday for 20 min    Alcohol Risk Factor Screening: On any occasion during the past 3 months, have you had more than 3 drinks containing alcohol? no     Do you average more than 7 drinks per week? no    Activities of Daily Living and Functional Status   -Do you need help with eating, walking, dressing, bathing, toileting, the phone, transportation, shopping, preparing meals, housework, laundry, medications or managing money? no  -Are you confident are you that you can control and manage most of your health problems? yes   Do you handle your own medications? yes  -How often do you have trouble taking your medications as prescribed? never    Fall Risk and Home Safety   Have you fallen 2 or more times in the past year? no  Does your home have rugs in the hallway, lack grab bars in the bathroom, lack handrails on the stairs or have poor lighting? yes  Do you have smoke detectors and check them regularly? yes  Do you have difficulties driving a car? No, but she prefers not to drive at night  Do you always fasten your seat belt when you are in a car? yes    Advance Care Planning:   Patient was offered the opportunity to discuss advance care planning:  yes     Does patient have an Advance Directive:  no   If no, did you provide information on Caring Connections? yes       Physical Examination     Vitals:    05/18/17 0834   BP: 136/58   Weight: 152 lb (68.9 kg)     Body mass index is 27.8 kg/(m^2).      Was the patient's timed Up & Go test unsteady or longer than 30 seconds? no      Preventive Services     (Preventive care checklist to be included in patient instructions)  Discussed today Done Previously Not Needed     X (Gmbjitt72 and PPSV23)  Pneumococcal vaccines    x  Flu vaccine     x Hepatitis B vaccine (if at risk)   x   Shingles vaccine    x  TDAP vaccine     x Mammogram     x Pap smear     x Abdominal ultrasound for AAA    X (2016, due 5/2018)  Colorectal cancer screening     x Low-dose CT for lung cancer screening    X (2015)  Bone density test    X (6/2016)  Glaucoma screening    X (3/17)  Cholesterol test    X (3/17)  Diabetes screening test      x Diabetes self-management class     x Nutritionist referral for diabetes or renal disease     Plan:      Health Maintenance   Topic Date Due    MEDICARE YEARLY EXAM  10/28/2016    FOOT EXAM Q1  10/28/2016    EYE EXAM RETINAL OR DILATED Q1  05/25/2017    INFLUENZA AGE 9 TO ADULT  08/01/2017    HEMOGLOBIN A1C Q6M  09/03/2017    MICROALBUMIN Q1  10/28/2017    LIPID PANEL Q1  03/03/2018    COLONOSCOPY  05/04/2018    GLAUCOMA SCREENING Q2Y  06/30/2018    DTaP/Tdap/Td series (2 - Td) 05/01/2025    OSTEOPOROSIS SCREENING (DEXA)  Completed    ZOSTER VACCINE AGE 60>  Addressed    Pneumococcal 65+ High/Highest Risk  Addressed       Diagnoses and all orders for this visit:    Medicare annual wellness visit, subsequent    Screening for depression    Essential hypertension, malignant    Mixed hyperlipidemia    Type 2 diabetes mellitus without complication, without long-term current use of insulin (HCC)    Chronic anemia      - PHQ2 score 1- does not desire pharmcotherapies at this time, but feels stress reduction techniques (coloring in coloring book, playing solitaire, riding stationary bike) help with feelings of sadness. Encouraged pt to reach out to daughters to help with care burden of her      - ACP not on file- \"Right to decide\" brochure and ACP form given to pt and discussed in detail in the situation pt is incapacitated to make her own decisions regarding therapies. Pt plans to discuss with prospective agent, complete form and bring back at next office visit. - Foot exam due next office visit, 5/31    - Colonscopy due 5/2018 for followup re precanerous polylps, mammo due 10/2017    - IMMZ up to date- declines zoster vax due to having shingles 3 times previously     Follow-up Disposition:  Return in about 2 weeks (around 5/31/2017) for follow up appt with Dr. Kelly Reilly. *Patient verbalized understanding and agreement with the plan. A copy of the After Visit Summary with personalized health plan was given to the patient today. Farhad Quintero NP     Patient Instructions   Repeat mammogram in October 2017     Keep eye appt, retinal eye exam on 6/9 and have records sent to us    Colonoscopy due 5/2018 for follow up on polyp removal     Make sure that you are drinking plenty of water (approx 36-48 oz) each day     Please review and discuss advanced care plan with prospective agent and bring back the completed form to our office. Contact us with any questions about the form if you have questions.

## 2017-05-31 ENCOUNTER — HOSPITAL ENCOUNTER (OUTPATIENT)
Dept: LAB | Age: 82
Discharge: HOME | End: 2017-05-31
Payer: MEDICARE

## 2017-05-31 ENCOUNTER — OFFICE VISIT (OUTPATIENT)
Dept: FAMILY MEDICINE CLINIC | Age: 82
End: 2017-05-31

## 2017-05-31 VITALS
HEART RATE: 58 BPM | HEIGHT: 62 IN | SYSTOLIC BLOOD PRESSURE: 136 MMHG | RESPIRATION RATE: 16 BRPM | WEIGHT: 150.4 LBS | BODY MASS INDEX: 27.68 KG/M2 | TEMPERATURE: 97.5 F | DIASTOLIC BLOOD PRESSURE: 50 MMHG | OXYGEN SATURATION: 98 %

## 2017-05-31 DIAGNOSIS — Z99.89 OSA ON CPAP: ICD-10-CM

## 2017-05-31 DIAGNOSIS — I10 ESSENTIAL HYPERTENSION, MALIGNANT: Primary | ICD-10-CM

## 2017-05-31 DIAGNOSIS — Z51.81 ENCOUNTER FOR MEDICATION MONITORING: ICD-10-CM

## 2017-05-31 DIAGNOSIS — D64.9 CHRONIC ANEMIA: ICD-10-CM

## 2017-05-31 DIAGNOSIS — E78.2 MIXED HYPERLIPIDEMIA: ICD-10-CM

## 2017-05-31 DIAGNOSIS — E11.9 TYPE 2 DIABETES MELLITUS WITHOUT COMPLICATION, WITHOUT LONG-TERM CURRENT USE OF INSULIN (HCC): ICD-10-CM

## 2017-05-31 DIAGNOSIS — G47.33 OSA ON CPAP: ICD-10-CM

## 2017-05-31 DIAGNOSIS — M48.061 SPINAL STENOSIS OF LUMBAR REGION: ICD-10-CM

## 2017-05-31 LAB
GLUCOSE POC: 154 MG/DL
HBA1C MFR BLD HPLC: 6.2 %

## 2017-05-31 PROCEDURE — 80048 BASIC METABOLIC PNL TOTAL CA: CPT

## 2017-05-31 PROCEDURE — 36415 COLL VENOUS BLD VENIPUNCTURE: CPT

## 2017-05-31 NOTE — PROGRESS NOTES
CMP 3/3/2017    A1C 3/3/2017    Lipid 3/3/2017    Microalbumin 10/28/2016     Mammogram 10/20/2016    Colonoscopy 5/2/2016 by Dr. Jaime Kaufman- repeat in 2 years    Eye exam was 5/25/2016 with Dr. Ami Pillai at [facility]. Patient states she has an appt 6/9/2017.

## 2017-05-31 NOTE — MR AVS SNAPSHOT
Visit Information Date & Time Provider Department Dept. Phone Encounter #  
 5/31/2017  9:00 AM Kathy AmayaCrow [phone] [id number] Follow-up Instructions Return in about 4 months (around 10/2/2017). Upcoming Health Maintenance Date Due  
 FOOT EXAM Q1 10/28/2016 EYE EXAM RETINAL OR DILATED Q1 5/25/2017 INFLUENZA AGE 9 TO ADULT 8/1/2017 HEMOGLOBIN A1C Q6M 9/3/2017 MICROALBUMIN Q1 10/28/2017 LIPID PANEL Q1 3/3/2018 COLONOSCOPY 5/4/2018 MEDICARE YEARLY EXAM 5/19/2018 GLAUCOMA SCREENING Q2Y 6/30/2018 DTaP/Tdap/Td series (2 - Td) 5/1/2025 Allergies as of 5/31/2017  Review Complete On: 5/31/2017 By: Kathy Amaya MD  
  
 Severity Noted Reaction Type Reactions Pcn [Penicillins] High 06/01/2010    Swelling  
 tongue Bactrim [Sulfamethoxazole-trimethoprim]  06/01/2010    Diarrhea Ceftin [Cefuroxime Axetil]  06/01/2010    Diarrhea Hydralazine  07/11/2012   Intolerance Other (comments) edema Sulfa (Sulfonamide Antibiotics)  06/01/2010    Itching Current Immunizations  Reviewed on 5/31/2017 Name Date Influenza High Dose Vaccine PF 10/28/2016, 11/5/2015 Influenza Vaccine 10/22/2014, 11/1/2013 Influenza Vaccine Split 9/12/2012, 9/13/2011, 12/7/2010 Pneumococcal Conjugate (PCV-13) 5/1/2015 Pneumococcal Vaccine (Pcv) 9/21/2009 Tdap 5/1/2015 Reviewed by Kathy Amaya MD on 5/31/2017 at  9:43 AM  
You Were Diagnosed With   
  
 Codes Comments Essential hypertension, malignant    -  Primary ICD-10-CM: I10 
ICD-9-CM: 401.0 Type 2 diabetes mellitus without complication, without long-term current use of insulin (HCC)     ICD-10-CM: E11.9 ICD-9-CM: 250.00 Mixed hyperlipidemia     ICD-10-CM: E78.2 ICD-9-CM: 272.2 ELEAZAR on CPAP     ICD-10-CM: G47.33, Z99.89 ICD-9-CM: 327.23, V46.8 Chronic anemia     ICD-10-CM: D64.9 ICD-9-CM: 285.9 Encounter for medication monitoring     ICD-10-CM: Z51.81 
ICD-9-CM: V58.83 Vitals BP Pulse Temp Resp Height(growth percentile) Weight(growth percentile) 136/50 (!) 58 97.5 °F (36.4 °C) (Oral) 16 5' 2\" (1.575 m) 150 lb 6.4 oz (68.2 kg) SpO2 BMI OB Status Smoking Status 98% 27.51 kg/m2 Hysterectomy Former Smoker Vitals History BMI and BSA Data Body Mass Index Body Surface Area  
 27.51 kg/m 2 1.73 m 2 Preferred Pharmacy Pharmacy Name Phone [facility] [address], [address] [phone] Your Updated Medication List  
  
   
This list is accurate as of: 5/31/17 10:11 AM.  Always use your most recent med list.  
  
  
  
  
 allopurinol 100 mg tablet Commonly known as:  ZYLOPRIM  
TAKE ONE TABLET BY MOUTH EVERY DAY  
  
 amLODIPine 10 mg tablet Commonly known as:  Yolandend Jaksch TAKE ONE TABLET BY MOUTH ONCE EVERY DAY  
  
 chlorthalidone 25 mg tablet Commonly known as:  Davi Inches Take 25 mg by mouth two (2) times a day. cloNIDine HCl 0.3 mg tablet Commonly known as:  CATAPRES  
TAKE ONE HALF (1/2) TABLET( S) DAILY AFTER LUNCH AND T LOLLY 1 TABLET EVERY NIGHT AT BEDTIME  
  
 CRESTOR 5 mg tablet Generic drug:  rosuvastatin TAKE ONE (1) TABLET(S) BY MOUTH N IGHTLY. FISH OIL 1,000 mg Cap Generic drug:  omega-3 fatty acids-vitamin e Take 1 Tab by mouth two (2) times a day. fluticasone 50 mcg/actuation nasal spray Commonly known as:  Edman Shaver 2 Sprays by Nasal route daily. Administer to right and left nostril.  
  
 metFORMIN  mg tablet Commonly known as:  GLUCOPHAGE XR Take 500 mg by mouth daily (with dinner). metoprolol tartrate 100 mg IR tablet Commonly known as:  LOPRESSOR  
TAKE ONE TABLET BY MOUTH TWICE A DAY  
  
 minoxidil 10 mg tablet Commonly known as:  Kimi Setters Take 5 mg by mouth daily. polyethylene glycol 17 gram packet Commonly known as:  Kathy Calvert Take 17 g by mouth daily as needed. PROCRIT INJECTION  
1,000 Units by Injection route every fourteen (14) days. Depending on blood count  
  
 spironolactone 25 mg tablet Commonly known as:  ALDACTONE Take 25 mg by mouth two (2) times a day. traMADol 50 mg tablet Commonly known as:  ULTRAM  
Take 1 Tab by mouth every six (6) hours as needed for Pain. Max Daily Amount: 200 mg. VITAMIN B-12 100 mcg tablet Generic drug:  cyanocobalamin Take 100 mcg by mouth daily. VITAMIN C 500 mg tablet Generic drug:  ascorbic acid (vitamin C) Take 500 mg by mouth two (2) times a day. VITAMIN D3 1,000 unit tablet Generic drug:  cholecalciferol Take 1,000 Units by mouth daily. We Performed the Following AMB POC COMPLETE CBC, AUTOMATED [01373 CPT(R)] AMB POC GLUCOSE, QUANTITATIVE, BLOOD [17583 CPT(R)] AMB POC HEMOGLOBIN A1C [08850 CPT(R)] METABOLIC PANEL, BASIC [80330 CPT(R)] Follow-up Instructions Return in about 4 months (around 10/2/2017). Introducing [facility]! Cyrus Hong introduces TextPayMe patient portal. Now you can access parts of your medical record, email your doctor's office, and request medication refills online. 1. In your internet browser, go to [email or web address] 2. Click on the First Time User? Click Here link in the Sign In box. You will see the New Member Sign Up page. 3. Enter your TextPayMe Access Code exactly as it appears below. You will not need to use this code after youve completed the sign-up process. If you do not sign up before the expiration date, you must request a new code. · TextPayMe Access Code: Rock Stone Expires: 6/1/2017 12:22 PM 
 
4. Enter the last four digits of your Social Security Number (xxxx) and Date of Birth (mm/dd/yyyy) as indicated and click Submit. You will be taken to the next sign-up page. 5. Create a Kno ID. This will be your Kno login ID and cannot be changed, so think of one that is secure and easy to remember. 6. Create a Kno password. You can change your password at any time. 7. Enter your Password Reset Question and Answer. This can be used at a later time if you forget your password. 8. Enter your e-mail address. You will receive e-mail notification when new information is available in [address] 9. Click Sign Up. You can now view and download portions of your medical record. 10. Click the Download Summary menu link to download a portable copy of your medical information. If you have questions, please visit the Frequently Asked Questions section of the Kno website. Remember, Kno is NOT to be used for urgent needs. For medical emergencies, dial 911. Now available from your iPhone and Android! Please provide this summary of care documentation to your next provider. Your primary care clinician is listed as Cristal Paulson. If you have any questions after today's visit, please call [phone].

## 2017-05-31 NOTE — PROGRESS NOTES
HISTORY OF PRESENT ILLNESS  Jamila Schrader is a 80 y.o. female. HPI   Follow up on chronic medical problems. Overall feeling good today. Cardiovascular Review:  The patient has hypertension, ELEAZAR and hyperlipidemia. Diet and Lifestyle: generally follows a low fat low cholesterol diet, generally follows a low sodium diet, follows a diabetic diet regularly  Home BP Monitoring: is well controlled at home, ranging 130s's/80's. Continues to see Dr. Ahsan Burnett at [facility] for BP. Pertinent ROS: taking medications as instructed, no medication side effects noted, no TIA's, no chest pain on exertion, no dyspnea on exertion, no swelling of ankles. Diabetes Mellitus:  She has diabetes mellitus. Diabetic ROS - diabetic diet compliance: compliant most of the time, home glucose monitoring: is performed regularly, fasting values range low 100s,  Pt does not have BS log with her today, further diabetic ROS: no polyuria or polydipsia, no chest pain, dyspnea or TIA's, no numbness, tingling or pain in extremities, no unusual visual symptoms, no hypoglycemia. Lab review: orders written for new lab studies as appropriate; see orders. Osteoarthritis:  Patient has osteoarthritis. Has spinal stenosis. Lower back pain is better after 4 weeks of PT. Having very minimal pain. Symptoms onset: problem is longstanding. Rheumatological ROS: stable, mild-to-moderate joint symptoms intermittently, reasonably well controlled by PRN meds. Response to treatment plan: stable and intermittent. Chronic anemia and multiple myeloma in remission is being followed by hematology.         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    Essential hypertension, malignant I10    Mixed hyperlipidemia E78.2    ELEAZAR on CPAP G47.33, Z99.89    Encounter for medication monitoring Z51.81    Type 2 diabetes mellitus without complication (HCC) L14.3       Current Outpatient Prescriptions   Medication Sig Dispense Refill    amLODIPine (NORVASC) 10 mg tablet TAKE ONE TABLET BY MOUTH ONCE EVERY DAY 90 Tab 3    allopurinol (ZYLOPRIM) 100 mg tablet TAKE ONE TABLET BY MOUTH EVERY DAY 90 Tab 3    metoprolol tartrate (LOPRESSOR) 100 mg IR tablet TAKE ONE TABLET BY MOUTH TWICE A DAY  3    traMADol (ULTRAM) 50 mg tablet Take 1 Tab by mouth every six (6) hours as needed for Pain. Max Daily Amount: 200 mg. 30 Tab 1    CRESTOR 5 mg tablet TAKE ONE (1) TABLET(S) BY MOUTH N IGHTLY. 90 Tab 3    minoxidil (LONITEN) 10 mg tablet Take 5 mg by mouth daily.  metFORMIN ER (GLUCOPHAGE XR) 500 mg tablet Take 500 mg by mouth daily (with dinner).  cloNIDine HCl (CATAPRES) 0.3 mg tablet TAKE ONE HALF (1/2) TABLET( S) DAILY AFTER LUNCH AND T LOLLY 1 TABLET EVERY NIGHT AT BEDTIME 135 Tab 3    polyethylene glycol (MIRALAX) 17 gram packet Take 17 g by mouth daily as needed.  cyanocobalamin (VITAMIN B-12) 100 mcg tablet Take 100 mcg by mouth daily.  EPOETIN BRYAN (PROCRIT IJ) 1,000 Units by Injection route as needed. Depending on blood count      spironolactone (ALDACTONE) 25 mg tablet Take 25 mg by mouth two (2) times a day.  chlorthalidone (HYGROTEN) 25 mg tablet Take 25 mg by mouth two (2) times a day.  fluticasone (FLONASE) 50 mcg/Actuation nasal spray 2 Sprays by Nasal route daily. Administer to right and left nostril. 1 Bottle 6    omega-3 fatty acids-vitamin e (FISH OIL) 1,000 mg Cap Take 1 Tab by mouth two (2) times a day.  ascorbic acid (VITAMIN C) 500 mg tablet Take 500 mg by mouth two (2) times a day.  cholecalciferol, vitamin d3, (VITAMIN D) 1,000 unit tablet Take 1,000 Units by mouth daily.          Allergies   Allergen Reactions    Pcn [Penicillins] Swelling     tongue    Bactrim [Sulfamethoxazole-Trimethoprim] Diarrhea    Ceftin [Cefuroxime Axetil] Diarrhea    Hydralazine Other (comments)     edema    Sulfa (Sulfonamide Antibiotics) Itching         Past Medical History:   Diagnosis Date    Anemia NEC     Benign neoplasm of adrenal gland     Chronic anemia 6/1/2010    Constipation     on a daily stool softener which pt states helps as of 4/27/16    Diabetes ([address]     WALKER (dyspnea on exertion)     as of 4/27/16 pt states this is her baseline and is not getting any worse    Goiter 2011    as of 4/27/16 Pt states \"I am not even sure I have it\".   Pt denies any problems    Gout 6/1/2010    as of 4/27/16 pt denies any sx    HTN (hypertension) 6/1/2010    followed by cardiology Dr Jacob Wells  [phone]    Multiple myeloma, without mention of having achieved remission Dx 11/1/99    followed by Wilma Shaikh    Pure hypercholesterolemia 6/1/2010    Sleep apnea 6/1/2010    CPAP    Spinal stenosis 6/1/2010         Past Surgical History:   Procedure Laterality Date    HX HYSTERECTOMY  1970s    HX ORTHOPAEDIC  2005    left hand-trigger finger    HX ORTHOPAEDIC  2007    right hand- trigger finger    MS COLONOSCOPY FLX DX W/COLLJ SPEC WHEN PFRMD  08/10/2010    Linh Paredes         Family History   Problem Relation Age of Onset    Hypertension Mother     No Known Problems Father     Cancer Sister 52     colon     Diabetes Sister     Kidney Disease Sister     Hypertension Sister     Elevated Lipids Sister        Social History   Substance Use Topics    Smoking status: Former Smoker     Quit date: 1/1/1980    Smokeless tobacco: Never Used    Alcohol use No        Lab Results   Component Value Date/Time    WBC 5.6 05/01/2015 09:05 AM    WBC 4.9 06/12/2012 09:40 AM    HGB 10.3 05/01/2015 09:05 AM    HCT 32.9 05/01/2015 09:05 AM    PLATELET 142 55/51/7942 09:05 AM    MCV 89 05/01/2015 09:05 AM       Lab Results   Component Value Date/Time    Cholesterol, total 130 03/03/2017 10:58 AM    HDL Cholesterol 36 03/03/2017 10:58 AM LDL, calculated 67 03/03/2017 10:58 AM    Triglyceride 134 03/03/2017 10:58 AM    CHOL/HDL Ratio 2.8 06/02/2010 09:57 AM       Lab Results   Component Value Date/Time    TSH 2.860 05/11/2011 09:29 AM      Lab Results   Component Value Date/Time    Sodium 134 03/03/2017 10:58 AM    Potassium 4.7 03/03/2017 10:58 AM    Chloride 101 03/03/2017 10:58 AM    CO2 19 03/03/2017 10:58 AM    Anion gap 9 02/26/2010 04:04 PM    Glucose 131 03/03/2017 10:58 AM    BUN 22 03/03/2017 10:58 AM    Creatinine 1.39 03/03/2017 10:58 AM    BUN/Creatinine ratio 16 03/03/2017 10:58 AM    GFR est AA 41 03/03/2017 10:58 AM    GFR est non-AA 35 03/03/2017 10:58 AM    Calcium 9.9 03/03/2017 10:58 AM    Bilirubin, total 0.4 03/03/2017 10:58 AM    ALT (SGPT) 9 03/03/2017 10:58 AM    AST (SGOT) 11 03/03/2017 10:58 AM    Alk. phosphatase 51 03/03/2017 10:58 AM    Protein, total 9.3 03/03/2017 10:58 AM    Albumin 4.5 03/03/2017 10:58 AM    Globulin 5.6 06/02/2010 09:57 AM    A-G Ratio 0.9 03/03/2017 10:58 AM      Lab Results   Component Value Date/Time    Hemoglobin A1c 7.4 10/28/2016 10:49 AM    Hemoglobin A1c (POC) 6.6 03/03/2017 10:58 AM    Hemoglobin A1c, External 6.4 10/14/2015         Review of Systems   Constitutional: Negative for malaise/fatigue. HENT: Negative for congestion. Eyes: Negative for blurred vision. Respiratory: Negative for cough and shortness of breath. Cardiovascular: Negative for chest pain, palpitations and leg swelling. Gastrointestinal: Negative for abdominal pain, constipation and heartburn. Genitourinary: Negative for dysuria, frequency and urgency. Musculoskeletal: Negative for joint pain. Neurological: Negative for dizziness, tingling and headaches. Endo/Heme/Allergies: Negative for environmental allergies. Psychiatric/Behavioral: Negative for depression. The patient does not have insomnia. Physical Exam   Constitutional: She appears well-developed and well-nourished.    /50  Pulse (!) 58  Temp 97.5 °F (36.4 °C) (Oral)   Resp 16  Ht 5' 2\" (1.575 m)  Wt 150 lb 6.4 oz (68.2 kg)  SpO2 98%  BMI 27.51 kg/m2     HENT:   Right Ear: Tympanic membrane and ear canal normal.   Left Ear: Tympanic membrane and ear canal normal.   Nose: No mucosal edema or rhinorrhea. Mouth/Throat: Oropharynx is clear and moist and mucous membranes are normal.   Neck: Normal range of motion. Neck supple. No thyromegaly present. Cardiovascular: Normal rate and regular rhythm. No murmur heard. Pulmonary/Chest: Effort normal and breath sounds normal.  Abdominal: Soft. Normal appearance and bowel sounds are normal. She exhibits no distension and no ascites. There is no hepatosplenomegaly. There is no tenderness. There is no rigidity, no rebound and no guarding. No hernia. Musculoskeletal: Normal range of motion. She exhibits no edema. Foot exam done . Normal sensation to PP, LT and vibration. Sensation intact to microfilament testing. Pulses intact. No swelling. No skin lesions or sores noted. No tinea present. Skin: Skin is warm and dry. Psychiatric: She has a normal mood and affect. Nursing note and vitals reviewed. ASSESSMENT and Emelina Swan was seen today for hypertension, cholesterol problem and diabetes. Diagnoses and all orders for this visit:    Essential hypertension, malignant  Stable     Type 2 diabetes mellitus without complication, without long-term current use of insulin (HCC)  -     AMB POC GLUCOSE, QUANTITATIVE, BLOOD  -     AMB POC HEMOGLOBIN A1C    Mixed hyperlipidemia  Continue to monitor. Work on diet and exercise. ELEAZAR on CPAP  Stable     Chronic anemia//Multiple Myeloma  As per hematology  -     AMB POC COMPLETE CBC, AUTOMATED    Spinal stenosis of lumbar region  Improved. Encounter for medication monitoring  -     METABOLIC PANEL, BASIC      Follow-up Disposition:  Return in about 4 months (around 10/2/2017).   reviewed diet, exercise and weight control  cardiovascular risk and specific lipid/LDL goals reviewed  reviewed medications and side effects in detail  specific diabetic recommendations: low cholesterol diet, weight control and daily exercise discussed, home glucose monitoring emphasized, foot care discussed and Podiatry visits discussed, annual eye examinations at Ophthalmology discussed and glycohemoglobin and other lab monitoring discussed     I have discussed diagnosis listed in this note with pt and/or family. I have discussed treatment plans and options and the risk/benefit analysis of those options, including safe use of medications and possible medication side effects. Through the use of shared decision making we have agreed to the above plan. The patient has received an after-visit summary and questions were answered concerning future plans and follow up. Advise pt of any urgent changes then to proceed to the ER.

## 2017-06-01 LAB
BUN SERPL-MCNC: 24 MG/DL (ref 8–27)
BUN/CREAT SERPL: 17 (ref 12–28)
CALCIUM SERPL-MCNC: 10 MG/DL (ref 8.7–10.3)
CHLORIDE SERPL-SCNC: 103 MMOL/L (ref 96–106)
CO2 SERPL-SCNC: 20 MMOL/L (ref 18–29)
CREAT SERPL-MCNC: 1.39 MG/DL (ref 0.57–1)
GLUCOSE SERPL-MCNC: 135 MG/DL (ref 65–99)
INTERPRETATION: NORMAL
POTASSIUM SERPL-SCNC: 4.7 MMOL/L (ref 3.5–5.2)
SODIUM SERPL-SCNC: 137 MMOL/L (ref 134–144)

## 2017-06-20 RX ORDER — METOPROLOL TARTRATE 100 MG/1
TABLET ORAL
Qty: 180 TAB | Refills: 3 | Status: SHIPPED | OUTPATIENT
Start: 2017-06-20 | End: 2017-12-20 | Stop reason: SDUPTHER

## 2017-07-05 RX ORDER — METFORMIN HYDROCHLORIDE 500 MG/1
TABLET, EXTENDED RELEASE ORAL
Qty: 180 TAB | Refills: 0 | Status: SHIPPED | OUTPATIENT
Start: 2017-07-05 | End: 2017-10-17

## 2017-10-16 NOTE — PROGRESS NOTES
HISTORY OF PRESENT ILLNESS  Nadine Gonzales is a 80 y.o. female. HPI   Follow up on chronic medical problems. Overall feeling well. Cardiovascular Review:  The patient has hypertension, ELEAZAR (but has not been using the CPAP) and hyperlipidemia. Diet and Lifestyle: generally follows a low fat low cholesterol diet, generally follows a low sodium diet, follows a diabetic diet regularly  Home BP Monitoring: is well controlled at home, ranging 130s's/70-80's. Pertinent ROS: taking medications as instructed, no medication side effects noted, no TIA's, no chest pain on exertion, no dyspnea on exertion, no swelling of ankles. Diabetes Mellitus:  She has diabetes mellitus. Diabetic ROS - diabetic diet compliance: compliant most of the time, home glucose monitoring: is performed regularly, fasting values range low 100s,  Pt does not have BS log with her today, further diabetic ROS: no polyuria or polydipsia, no chest pain, dyspnea or TIA's, no numbness, tingling or pain in extremities, no unusual visual symptoms, no hypoglycemia. Lab review: orders written for new lab studies as appropriate; see orders. Chronic anemia and multiple myeloma in remission is being followed by hematology.       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    Essential hypertension, malignant I10    Mixed hyperlipidemia E78.2    ELEAZAR on CPAP G47.33, Z99.89    Encounter for medication monitoring Z51.81    Type 2 diabetes mellitus without complication (HCC) X90.6       Current Outpatient Prescriptions   Medication Sig Dispense Refill    metFORMIN ER (GLUCOPHAGE XR) 500 mg tablet TAKE TWO TABLETS BY MOUTH EVERY DAY WITH SUPPER 180 Tab 0    metoprolol tartrate (LOPRESSOR) 100 mg IR tablet TAKE ONE TABLET BY MOUTH TWICE A  Tab 3    amLODIPine (NORVASC) 10 mg tablet TAKE ONE TABLET BY MOUTH ONCE EVERY DAY 90 Tab 3    allopurinol (ZYLOPRIM) 100 mg tablet TAKE ONE TABLET BY MOUTH EVERY DAY 90 Tab 3    metoprolol tartrate (LOPRESSOR) 100 mg IR tablet TAKE ONE TABLET BY MOUTH TWICE A DAY  3    traMADol (ULTRAM) 50 mg tablet Take 1 Tab by mouth every six (6) hours as needed for Pain. Max Daily Amount: 200 mg. 30 Tab 1    CRESTOR 5 mg tablet TAKE ONE (1) TABLET(S) BY MOUTH N IGHTLY. 90 Tab 3    minoxidil (LONITEN) 10 mg tablet Take 5 mg by mouth daily.  metFORMIN ER (GLUCOPHAGE XR) 500 mg tablet Take 500 mg by mouth daily (with dinner).  cloNIDine HCl (CATAPRES) 0.3 mg tablet TAKE ONE HALF (1/2) TABLET( S) DAILY AFTER LUNCH AND T LOLLY 1 TABLET EVERY NIGHT AT BEDTIME 135 Tab 3    polyethylene glycol (MIRALAX) 17 gram packet Take 17 g by mouth daily as needed.  cyanocobalamin (VITAMIN B-12) 100 mcg tablet Take 100 mcg by mouth daily.  EPOETIN BRYAN (PROCRIT IJ) 1,000 Units by Injection route every fourteen (14) days. Depending on blood count       spironolactone (ALDACTONE) 25 mg tablet Take 25 mg by mouth two (2) times a day.  chlorthalidone (HYGROTEN) 25 mg tablet Take 25 mg by mouth two (2) times a day.  fluticasone (FLONASE) 50 mcg/Actuation nasal spray 2 Sprays by Nasal route daily. Administer to right and left nostril. 1 Bottle 6    omega-3 fatty acids-vitamin e (FISH OIL) 1,000 mg Cap Take 1 Tab by mouth two (2) times a day.  ascorbic acid (VITAMIN C) 500 mg tablet Take 500 mg by mouth two (2) times a day.  cholecalciferol, vitamin d3, (VITAMIN D) 1,000 unit tablet Take 1,000 Units by mouth daily.          Allergies   Allergen Reactions    Pcn [Penicillins] Swelling     tongue    Bactrim [Sulfamethoxazole-Trimethoprim] Diarrhea    Ceftin [Cefuroxime Axetil] Diarrhea    Hydralazine Other (comments)     edema    Sulfa (Sulfonamide Antibiotics) Itching         Past Medical History:   Diagnosis Date    Anemia NEC     Benign neoplasm of adrenal gland     Chronic anemia 6/1/2010    Constipation     on a daily stool softener which pt states helps as of 4/27/16    Diabetes ([address]     WALKER (dyspnea on exertion)     as of 4/27/16 pt states this is her baseline and is not getting any worse    Goiter 2011    as of 4/27/16 Pt states \"I am not even sure I have it\".   Pt denies any problems    Gout 6/1/2010    as of 4/27/16 pt denies any sx    HTN (hypertension) 6/1/2010    followed by cardiology Dr Bandar Goss  [phone]    Multiple myeloma, without mention of having achieved remission Dx 11/1/99    [facility]    Pure hypercholesterolemia 6/1/2010    Sleep apnea 6/1/2010    CPAP    Spinal stenosis 6/1/2010         Past Surgical History:   Procedure Laterality Date    HX HYSTERECTOMY  1970s    HX ORTHOPAEDIC  2005    left hand-trigger finger    HX ORTHOPAEDIC  2007    right hand- trigger finger    ID COLONOSCOPY FLX DX W/COLLJ SPEC WHEN PFRMD  08/10/2010    Doug Sanford         Family History   Problem Relation Age of Onset    Hypertension Mother     No Known Problems Father     Cancer Sister 52     colon     Diabetes Sister     Kidney Disease Sister     Hypertension Sister     Elevated Lipids Sister        Social History   Substance Use Topics    Smoking status: Former Smoker     Quit date: 1/1/1980    Smokeless tobacco: Never Used    Alcohol use No        Lab Results   Component Value Date/Time    WBC 5.6 05/01/2015 09:05 AM    WBC 4.9 06/12/2012 09:40 AM    HGB 10.3 05/01/2015 09:05 AM    HCT 32.9 05/01/2015 09:05 AM    PLATELET 675 95/05/8377 09:05 AM    MCV 89 05/01/2015 09:05 AM       Lab Results   Component Value Date/Time    Cholesterol, total 130 03/03/2017 10:58 AM    HDL Cholesterol 36 03/03/2017 10:58 AM    LDL, calculated 67 03/03/2017 10:58 AM    Triglyceride 134 03/03/2017 10:58 AM    CHOL/HDL Ratio 2.8 06/02/2010 09:57 AM       Lab Results   Component Value Date/Time Sodium 137 05/31/2017 10:01 AM    Potassium 4.7 05/31/2017 10:01 AM    Chloride 103 05/31/2017 10:01 AM    CO2 20 05/31/2017 10:01 AM    Anion gap 9 02/26/2010 04:04 PM    Glucose 135 05/31/2017 10:01 AM    BUN 24 05/31/2017 10:01 AM    Creatinine 1.39 05/31/2017 10:01 AM    BUN/Creatinine ratio 17 05/31/2017 10:01 AM    GFR est AA 41 05/31/2017 10:01 AM    GFR est non-AA 35 05/31/2017 10:01 AM    Calcium 10.0 05/31/2017 10:01 AM    Bilirubin, total 0.4 03/03/2017 10:58 AM    ALT (SGPT) 9 03/03/2017 10:58 AM    AST (SGOT) 11 03/03/2017 10:58 AM    Alk. phosphatase 51 03/03/2017 10:58 AM    Protein, total 9.3 03/03/2017 10:58 AM    Albumin 4.5 03/03/2017 10:58 AM    Globulin 5.6 06/02/2010 09:57 AM    A-G Ratio 0.9 03/03/2017 10:58 AM      Lab Results   Component Value Date/Time    Hemoglobin A1c 7.4 10/28/2016 10:49 AM    Hemoglobin A1c (POC) 6.2 05/31/2017 10:01 AM    Hemoglobin A1c, External 6.4 10/14/2015         Review of Systems   Constitutional: Negative for malaise/fatigue. HENT: Negative for congestion. Eyes: Negative for blurred vision. Respiratory: Negative for cough and shortness of breath. Cardiovascular: Negative for chest pain, palpitations and leg swelling. Gastrointestinal: Negative for abdominal pain, constipation and heartburn. Genitourinary: Negative for dysuria, frequency and urgency. Musculoskeletal: Negative for back pain and joint pain. Neurological: Negative for dizziness, tingling and headaches. Endo/Heme/Allergies: Negative for environmental allergies. Psychiatric/Behavioral: Negative for depression. The patient does not have insomnia. Physical Exam   Constitutional: She appears well-developed and well-nourished.    /60  Pulse (!) 59  Temp 96.6 °F (35.9 °C) (Oral)   Resp 16  Ht 5' 2\" (1.575 m)  Wt 148 lb (67.1 kg)  SpO2 97%  BMI 27.07 kg/m2     HENT:   Right Ear: Tympanic membrane and ear canal normal.   Left Ear: Tympanic membrane and ear canal normal.   Nose: No mucosal edema or rhinorrhea. Mouth/Throat: Oropharynx is clear and moist and mucous membranes are normal.   Neck: Normal range of motion. Neck supple. No thyromegaly present. Cardiovascular: Normal rate and regular rhythm. No murmur heard. Pulmonary/Chest: Effort normal and breath sounds normal.   Abdominal: Soft. Bowel sounds are normal. There is no tenderness. Musculoskeletal: Normal range of motion. She exhibits no edema. Lymphadenopathy:     She has no cervical adenopathy. Skin: Skin is warm and dry. Psychiatric: She has a normal mood and affect. Nursing note and vitals reviewed. ASSESSMENT and PLAN  Diagnoses and all orders for this visit:    1. Essential hypertension, malignant  Discussed sodium restriction, high k rich diet, maintaining ideal body weight and regular exercise program such as daily walking 30 min perday 4-5 times per week, as physiologic means to achieve blood pressure control.  Medication compliance advised. 2. Type 2 diabetes mellitus without complication, without long-term current use of insulin (HCC)  -     AMB POC GLUCOSE, QUANTITATIVE, BLOOD  -     AMB POC HEMOGLOBIN A1C  -     MICROALBUMIN, UR, RAND W/ MICROALBUMIN/CREA RATIO  -     AMB POC URINALYSIS DIP STICK AUTO W/ MICRO    3. Mixed hyperlipidemia  -     LIPID PANEL    4. ELEAZAR   Pt decline going back to sleep specialist for better fit with the CPAP. 5. Chronic anemia  As per hematology    6. Encounter for medication monitoring  -     METABOLIC PANEL, COMPREHENSIVE  -     CBC W/O DIFF      Follow-up Disposition:  Return in about 5 months (around 3/17/2018).   reviewed diet, exercise and weight control  cardiovascular risk and specific lipid/LDL goals reviewed  reviewed medications and side effects in detail  specific diabetic recommendations: low cholesterol diet, weight control and daily exercise discussed, foot care discussed and Podiatry visits discussed, annual eye examinations at Ophthalmology discussed and glycohemoglobin and other lab monitoring discussed    I have discussed diagnosis listed in this note with pt and/or family. I have discussed treatment plans and options and the risk/benefit analysis of those options, including safe use of medications and possible medication side effects. Through the use of shared decision making we have agreed to the above plan. The patient has received an after-visit summary and questions were answered concerning future plans and follow up. Advise pt of any urgent changes then to proceed to the ER.

## 2017-10-17 ENCOUNTER — HOSPITAL ENCOUNTER (OUTPATIENT)
Dept: LAB | Age: 82
Discharge: HOME | End: 2017-10-17
Payer: MEDICARE

## 2017-10-17 ENCOUNTER — OFFICE VISIT (OUTPATIENT)
Dept: FAMILY MEDICINE CLINIC | Age: 82
End: 2017-10-17

## 2017-10-17 VITALS
BODY MASS INDEX: 27.23 KG/M2 | RESPIRATION RATE: 16 BRPM | SYSTOLIC BLOOD PRESSURE: 142 MMHG | HEIGHT: 62 IN | WEIGHT: 148 LBS | DIASTOLIC BLOOD PRESSURE: 60 MMHG | TEMPERATURE: 96.6 F | OXYGEN SATURATION: 97 % | HEART RATE: 59 BPM

## 2017-10-17 DIAGNOSIS — D64.9 CHRONIC ANEMIA: ICD-10-CM

## 2017-10-17 DIAGNOSIS — G47.33 OSA (OBSTRUCTIVE SLEEP APNEA): ICD-10-CM

## 2017-10-17 DIAGNOSIS — E78.2 MIXED HYPERLIPIDEMIA: ICD-10-CM

## 2017-10-17 DIAGNOSIS — I10 ESSENTIAL HYPERTENSION, MALIGNANT: Primary | ICD-10-CM

## 2017-10-17 DIAGNOSIS — E11.9 TYPE 2 DIABETES MELLITUS WITHOUT COMPLICATION, WITHOUT LONG-TERM CURRENT USE OF INSULIN (HCC): ICD-10-CM

## 2017-10-17 DIAGNOSIS — Z51.81 ENCOUNTER FOR MEDICATION MONITORING: ICD-10-CM

## 2017-10-17 LAB
BILIRUB UR QL STRIP: NEGATIVE
GLUCOSE POC: 121 MG/DL
GLUCOSE UR-MCNC: NEGATIVE MG/DL
HBA1C MFR BLD HPLC: 6.2 %
KETONES P FAST UR STRIP-MCNC: NEGATIVE MG/DL
PH UR STRIP: 5.5 [PH] (ref 4.6–8)
PROT UR QL STRIP: NORMAL MG/DL
SP GR UR STRIP: 1.01 (ref 1–1.03)
UA UROBILINOGEN AMB POC: NORMAL (ref 0.2–1)
URINALYSIS CLARITY POC: CLEAR
URINALYSIS COLOR POC: YELLOW
URINE BLOOD POC: NEGATIVE
URINE LEUKOCYTES POC: NEGATIVE
URINE NITRITES POC: NEGATIVE

## 2017-10-17 PROCEDURE — 85027 COMPLETE CBC AUTOMATED: CPT

## 2017-10-17 PROCEDURE — 80053 COMPREHEN METABOLIC PANEL: CPT

## 2017-10-17 PROCEDURE — 36415 COLL VENOUS BLD VENIPUNCTURE: CPT

## 2017-10-17 PROCEDURE — 80061 LIPID PANEL: CPT

## 2017-10-17 PROCEDURE — 82043 UR ALBUMIN QUANTITATIVE: CPT

## 2017-10-17 NOTE — MR AVS SNAPSHOT
Visit Information Date & Time Provider Department Dept. Phone Encounter #  
 10/17/2017  2:45 PM Crow Calvert [phone] [id number] Follow-up Instructions Return in about 5 months (around 3/17/2018). Upcoming Health Maintenance Date Due MICROALBUMIN Q1 10/28/2017 HEMOGLOBIN A1C Q6M 11/30/2017 LIPID PANEL Q1 3/3/2018 COLONOSCOPY 5/4/2018 MEDICARE YEARLY EXAM 5/19/2018 FOOT EXAM Q1 5/31/2018 EYE EXAM RETINAL OR DILATED Q1 6/9/2018 GLAUCOMA SCREENING Q2Y 6/9/2019 DTaP/Tdap/Td series (2 - Td) 5/1/2025 Allergies as of 10/17/2017  Review Complete On: 10/17/2017 By: Lilli Mckinley LPN Severity Noted Reaction Type Reactions Pcn [Penicillins] High 06/01/2010    Swelling  
 tongue Bactrim [Sulfamethoxazole-trimethoprim]  06/01/2010    Diarrhea Ceftin [Cefuroxime Axetil]  06/01/2010    Diarrhea Hydralazine  07/11/2012   Intolerance Other (comments) edema Sulfa (Sulfonamide Antibiotics)  06/01/2010    Itching Current Immunizations  Reviewed on 5/31/2017 Name Date Influenza High Dose Vaccine PF 10/28/2016, 11/5/2015 Influenza Vaccine 10/3/2017, 10/22/2014, 11/1/2013 Influenza Vaccine Split 9/12/2012, 9/13/2011, 12/7/2010 Pneumococcal Conjugate (PCV-13) 5/1/2015 Pneumococcal Vaccine (Pcv) 9/21/2009 Tdap 5/1/2015 Not reviewed this visit You Were Diagnosed With   
  
 Codes Comments Essential hypertension, malignant    -  Primary ICD-10-CM: I10 
ICD-9-CM: 401.0 Type 2 diabetes mellitus without complication, without long-term current use of insulin (HCC)     ICD-10-CM: E11.9 ICD-9-CM: 250.00 Mixed hyperlipidemia     ICD-10-CM: E78.2 ICD-9-CM: 272.2 ELEAZAR on CPAP     ICD-10-CM: G47.33, Z99.89 ICD-9-CM: 327.23, V46.8 Chronic anemia     ICD-10-CM: D64.9 ICD-9-CM: 285.9  Encounter for medication monitoring     ICD-10-CM: Z51.81 
 ICD-9-CM: V58.83 Vitals BP Pulse Temp Resp Height(growth percentile) Weight(growth percentile) 155/58 (BP 1 Location: Left arm, BP Patient Position: Sitting) (!) 59 96.6 °F (35.9 °C) (Oral) 16 5' 2\" (1.575 m) 148 lb (67.1 kg) SpO2 BMI OB Status Smoking Status 97% 27.07 kg/m2 Hysterectomy Former Smoker Vitals History BMI and BSA Data Body Mass Index Body Surface Area  
 27.07 kg/m 2 1.71 m 2 Preferred Pharmacy Pharmacy Name Phone 305 [facility], [address] [address] [place] [address] [address] Jhonny Cannon Your Updated Medication List  
  
   
This list is accurate as of: 10/17/17  4:01 PM.  Always use your most recent med list.  
  
  
  
  
 allopurinol 100 mg tablet Commonly known as:  ZYLOPRIM  
TAKE ONE TABLET BY MOUTH EVERY DAY  
  
 amLODIPine 10 mg tablet Commonly known as:  Arlyss [place] TAKE ONE TABLET BY MOUTH ONCE EVERY DAY  
  
 chlorthalidone 25 mg tablet Commonly known as:  Gabrielle Phoenix Take 25 mg by mouth two (2) times a day. cloNIDine HCl 0.3 mg tablet Commonly known as:  CATAPRES  
TAKE ONE HALF (1/2) TABLET( S) DAILY AFTER LUNCH AND T LOLLY 1 TABLET EVERY NIGHT AT BEDTIME  
  
 CRESTOR 5 mg tablet Generic drug:  rosuvastatin TAKE ONE (1) TABLET(S) BY MOUTH N IGHTLY. FISH OIL 1,000 mg Cap Generic drug:  omega-3 fatty acids-vitamin e Take 1 Tab by mouth two (2) times a day. fluticasone 50 mcg/actuation nasal spray Commonly known as:  Nilson Riser 2 Sprays by Nasal route daily. Administer to right and left nostril.  
  
 metFORMIN  mg tablet Commonly known as:  GLUCOPHAGE XR Take 500 mg by mouth daily (with dinner). metoprolol tartrate 100 mg IR tablet Commonly known as:  LOPRESSOR  
TAKE ONE TABLET BY MOUTH TWICE A DAY  
  
 minoxidil 10 mg tablet Commonly known as:  Lencho Kluver Take 5 mg by mouth daily. polyethylene glycol 17 gram packet Commonly known as:  Leata Sans Introducing [facility]! Ced Desai introduces Ozmosis patient portal. Now you can access parts of your medical record, email your doctor's office, and request medication refills online. 1. In your internet browser, go to [email or web address] 2. Click on the First Time User? Click Here link in the Sign In box. You will see the New Member Sign Up page. 3. Enter your Ozmosis Access Code exactly as it appears below. You will not need to use this code after youve completed the sign-up process. If you do not sign up before the expiration date, you must request a new code. · Ozmosis Access Code: [email or web address] Expires: 1/15/2018  4:01 PM 
 
4. Enter the last four digits of your Social Security Number (xxxx) and Date of Birth (mm/dd/yyyy) as indicated and click Submit. You will be taken to the next sign-up page. 5. Create a Ozmosis ID. This will be your Ozmosis login ID and cannot be changed, so think of one that is secure and easy to remember. 6. Create a Ozmosis password. You can change your password at any time. 7. Enter your Password Reset Question and Answer. This can be used at a later time if you forget your password. 8. Enter your e-mail address. You will receive e-mail notification when new information is available in [address] 9. Click Sign Up. You can now view and download portions of your medical record. 10. Click the Download Summary menu link to download a portable copy of your medical information. If you have questions, please visit the Frequently Asked Questions section of the Ozmosis website. Remember, Ozmosis is NOT to be used for urgent needs. For medical emergencies, dial 911. Now available from your iPhone and Android! Please provide this summary of care documentation to your next provider. Your primary care clinician is listed as Guanako Walden. If you have any questions after today's visit, please call [phone].

## 2017-10-17 NOTE — PROGRESS NOTES
Chief Complaint   Patient presents with    Hypertension    Diabetes             BPM 5/31/17  Lipid 3/3/17  A1C 5/31/17  Micro albumin 10/28/16  Mammogram 3/20/17  Colonoscopy 5/2/16 Dr. Rush Better recommended 2 yr f/u  Eye exam 6/9/17

## 2017-10-18 LAB
ALBUMIN SERPL-MCNC: 4.2 G/DL (ref 3.5–4.7)
ALBUMIN/CREAT UR: 188.3 MG/G CREAT (ref 0–30)
ALBUMIN/GLOB SERPL: 0.8 {RATIO} (ref 1.2–2.2)
ALP SERPL-CCNC: 51 IU/L (ref 39–117)
ALT SERPL-CCNC: 9 IU/L (ref 0–32)
AST SERPL-CCNC: 13 IU/L (ref 0–40)
BILIRUB SERPL-MCNC: 0.5 MG/DL (ref 0–1.2)
BUN SERPL-MCNC: 23 MG/DL (ref 8–27)
BUN/CREAT SERPL: 20 (ref 12–28)
CALCIUM SERPL-MCNC: 10 MG/DL (ref 8.7–10.3)
CHLORIDE SERPL-SCNC: 101 MMOL/L (ref 96–106)
CHOLEST SERPL-MCNC: 112 MG/DL (ref 100–199)
CO2 SERPL-SCNC: 24 MMOL/L (ref 18–29)
CREAT SERPL-MCNC: 1.16 MG/DL (ref 0.57–1)
CREAT UR-MCNC: 64.3 MG/DL
ERYTHROCYTE [DISTWIDTH] IN BLOOD BY AUTOMATED COUNT: 17.5 % (ref 12.3–15.4)
GLOBULIN SER CALC-MCNC: 5.2 G/DL (ref 1.5–4.5)
GLUCOSE SERPL-MCNC: 105 MG/DL (ref 65–99)
HCT VFR BLD AUTO: 31 % (ref 34–46.6)
HDLC SERPL-MCNC: 36 MG/DL
HGB BLD-MCNC: 9.9 G/DL (ref 11.1–15.9)
INTERPRETATION, 910389: NORMAL
INTERPRETATION: NORMAL
LDLC SERPL CALC-MCNC: 51 MG/DL (ref 0–99)
Lab: NORMAL
MCH RBC QN AUTO: 29.2 PG (ref 26.6–33)
MCHC RBC AUTO-ENTMCNC: 31.9 G/DL (ref 31.5–35.7)
MCV RBC AUTO: 91 FL (ref 79–97)
MICROALBUMIN UR-MCNC: 121.1 UG/ML
PDF IMAGE, 910387: NORMAL
PLATELET # BLD AUTO: 272 X10E3/UL (ref 150–379)
POTASSIUM SERPL-SCNC: 4.1 MMOL/L (ref 3.5–5.2)
PROT SERPL-MCNC: 9.4 G/DL (ref 6–8.5)
RBC # BLD AUTO: 3.39 X10E6/UL (ref 3.77–5.28)
SODIUM SERPL-SCNC: 139 MMOL/L (ref 134–144)
TRIGL SERPL-MCNC: 127 MG/DL (ref 0–149)
VLDLC SERPL CALC-MCNC: 25 MG/DL (ref 5–40)
WBC # BLD AUTO: 5.6 X10E3/UL (ref 3.4–10.8)

## 2017-10-18 RX ORDER — SPIRONOLACTONE 25 MG/1
25 TABLET ORAL 2 TIMES DAILY
Qty: 180 TAB | Refills: 3 | Status: SHIPPED | OUTPATIENT
Start: 2017-10-18 | End: 2017-12-20 | Stop reason: SDUPTHER

## 2017-10-18 RX ORDER — ROSUVASTATIN CALCIUM 5 MG/1
TABLET, COATED ORAL
Qty: 90 TAB | Refills: 3 | Status: SHIPPED | OUTPATIENT
Start: 2017-10-18 | End: 2018-11-20 | Stop reason: SDUPTHER

## 2017-10-18 RX ORDER — CLONIDINE HYDROCHLORIDE 0.3 MG/1
TABLET ORAL
Qty: 135 TAB | Refills: 3 | Status: SHIPPED | OUTPATIENT
Start: 2017-10-18 | End: 2018-10-13 | Stop reason: SDUPTHER

## 2017-10-23 RX ORDER — CHLORTHALIDONE 25 MG/1
25 TABLET ORAL 2 TIMES DAILY
OUTPATIENT
Start: 2017-10-23

## 2017-10-23 NOTE — TELEPHONE ENCOUNTER
----- Message from Charisse Leggett sent at 10/23/2017  8:26 AM EDT -----  Regarding: /Refill   Pt request refill on Chlorthalidone at [facility]. Pt stated she give wrong medication the other day. Best contact number is [phone].

## 2017-11-13 NOTE — PROGRESS NOTES
HISTORY OF PRESENT ILLNESS  Carmella Herrera is a 80 y.o. female. HPI   C/o numb feeling in the left upper thigh over the past 5-6 days. Started off with a pain in the groin and moved to the front of the thigh. No radiation further down the leg. Now feeling more numb than pain. No swelling noted. Seeing ortho for lower back pain and spinal stenosis and schedule for a MRI of the lumbar spine. Back pain is about the same. No new injury noted. 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    Essential hypertension, malignant I10    Mixed hyperlipidemia E78.2    ELEAZAR on CPAP G47.33, Z99.89    Encounter for medication monitoring Z51.81    Type 2 diabetes mellitus without complication (HCC) O77.7    ELEAZAR (obstructive sleep apnea) G47.33       Current Outpatient Prescriptions   Medication Sig Dispense Refill    fluticasone (FLONASE) 50 mcg/actuation nasal spray 2 Sprays by Both Nostrils route. Daily as needed      chlorthalidone (HYGROTEN) 25 mg tablet Take 1 Tab by mouth two (2) times a day. 180 Tab 3    predniSONE (DELTASONE) 10 mg tablet Take 1 Tab by mouth two (2) times a day for 5 days. 10 Tab 0    cloNIDine HCl (CATAPRES) 0.3 mg tablet TAKE ONE HALF (1/2) TABLET( S) DAILY AFTER LUNCH AND T LOLLY 1 TABLET EVERY NIGHT AT BEDTIME 135 Tab 3    spironolactone (ALDACTONE) 25 mg tablet Take 1 Tab by mouth two (2) times a day. 180 Tab 3    rosuvastatin (CRESTOR) 5 mg tablet TAKE ONE (1) TABLET(S) BY MOUTH N IGHTLY.  90 Tab 3    metoprolol tartrate (LOPRESSOR) 100 mg IR tablet TAKE ONE TABLET BY MOUTH TWICE A  Tab 3    amLODIPine (NORVASC) 10 mg tablet TAKE ONE TABLET BY MOUTH ONCE EVERY DAY 90 Tab 3    allopurinol (ZYLOPRIM) 100 mg tablet TAKE ONE TABLET BY MOUTH EVERY DAY 90 Tab 3    traMADol (ULTRAM) 50 mg tablet Take 1 Tab by mouth every six (6) hours as needed for Pain. Max Daily Amount: 200 mg. 30 Tab 1    minoxidil (LONITEN) 10 mg tablet Take 5 mg by mouth daily.  metFORMIN ER (GLUCOPHAGE XR) 500 mg tablet Take 500 mg by mouth daily (with dinner).  polyethylene glycol (MIRALAX) 17 gram packet Take 17 g by mouth daily as needed.  cyanocobalamin (VITAMIN B-12) 100 mcg tablet Take 100 mcg by mouth daily.  EPOETIN BRYAN (PROCRIT IJ) 1,000 Units by Injection route every fourteen (14) days. Depending on blood count       omega-3 fatty acids-vitamin e (FISH OIL) 1,000 mg Cap Take 1 Tab by mouth two (2) times a day.  ascorbic acid (VITAMIN C) 500 mg tablet Take 500 mg by mouth two (2) times a day.  cholecalciferol, vitamin d3, (VITAMIN D) 1,000 unit tablet Take 1,000 Units by mouth daily.  fluticasone (FLONASE) 50 mcg/Actuation nasal spray 2 Sprays by Nasal route daily. Administer to right and left nostril. (Patient taking differently: 2 Sprays by Nasal route. Administer to right and left nostril.) 1 Bottle 6       Allergies   Allergen Reactions    Pcn [Penicillins] Swelling     tongue    Bactrim [Sulfamethoxazole-Trimethoprim] Diarrhea    Ceftin [Cefuroxime Axetil] Diarrhea    Hydralazine Other (comments)     edema    Sulfa (Sulfonamide Antibiotics) Itching       Past Medical History:   Diagnosis Date    Anemia NEC     Benign neoplasm of adrenal gland     Chronic anemia 6/1/2010    Constipation     on a daily stool softener which pt states helps as of 4/27/16    Diabetes ([address]     WALKER (dyspnea on exertion)     as of 4/27/16 pt states this is her baseline and is not getting any worse    Goiter 2011    as of 4/27/16 Pt states \"I am not even sure I have it\".   Pt denies any problems    Gout 6/1/2010    as of 4/27/16 pt denies any sx    HTN (hypertension) 6/1/2010    followed by cardiology Dr Bandar Goss  [phone]    Multiple myeloma, without mention of having achieved remission Dx 11/1/99    followed by Suki Cevallos    Pure hypercholesterolemia 6/1/2010    Sleep apnea 6/1/2010    CPAP    Spinal stenosis 6/1/2010       Past Surgical History:   Procedure Laterality Date    HX HYSTERECTOMY  1970s    HX ORTHOPAEDIC  2005    left hand-trigger finger    HX ORTHOPAEDIC  2007    right hand- trigger finger    MO COLONOSCOPY FLX DX W/COLLJ SPEC WHEN PFRMD  08/10/2010           Family History   Problem Relation Age of Onset    Hypertension Mother     No Known Problems Father     Cancer Sister 52     colon     Diabetes Sister     Kidney Disease Sister     Hypertension Sister     Elevated Lipids Sister        Social History   Substance Use Topics    Smoking status: Former Smoker     Quit date: 1/1/1980    Smokeless tobacco: Never Used    Alcohol use No        ROS    Physical Exam   Constitutional: She appears well-developed and well-nourished. /50  Pulse (!) 56  Temp 97.3 °F (36.3 °C) (Oral)   Resp 16  Ht 5' 2\" (1.575 m)  Wt 141 lb (64 kg)  SpO2 97%  BMI 25.79 kg/m2     Neck: Normal range of motion. Neck supple. No thyromegaly present. Cardiovascular: Normal rate and regular rhythm. No murmur heard. Pulmonary/Chest: Effort normal and breath sounds normal.   Abdominal: Soft. Bowel sounds are normal. There is no tenderness. Musculoskeletal: Normal range of motion. She exhibits tenderness. She exhibits no edema. Left upper leg: She exhibits no tenderness, no bony tenderness and no edema. Lymphadenopathy:     She has no cervical adenopathy. Neurological: She has normal strength and normal reflexes. She displays no atrophy. Sensory deficit: slight decrease to PP sensation in the anterior thigh. She exhibits normal muscle tone. Coordination and gait normal.   Skin: Skin is warm and dry. Psychiatric: She has a normal mood and affect. Nursing note and vitals reviewed.       ASSESSMENT and PLAN  Diagnoses and all orders for this visit:    1. Neuralgia of left thigh//  2. Pain of left thigh  -     XR FEMUR LT 2 V; Future  -     predniSONE (DELTASONE) 10 mg tablet; Take 1 Tab by mouth two (2) times a day for 5 days. 3. Spinal stenosis of lumbar region, unspecified whether neurogenic claudication present  As per ortho. Await MRI as this likely causing the numbness in the leg. Other orders  -    Refill  chlorthalidone (HYGROTEN) 25 mg tablet; Take 1 Tab by mouth two (2) times a day. Follow-up Disposition:   reviewed medications and side effects in detail  I have discussed diagnosis listed in this note with pt and/or family. I have discussed treatment plans and options and the risk/benefit analysis of those options, including safe use of medications and possible medication side effects. Through the use of shared decision making we have agreed to the above plan. The patient has received an after-visit summary and questions were answered concerning future plans and follow up. Advise pt of any urgent changes then to proceed to the ER.

## 2017-11-14 ENCOUNTER — OFFICE VISIT (OUTPATIENT)
Dept: FAMILY MEDICINE CLINIC | Age: 82
End: 2017-11-14

## 2017-11-14 VITALS
SYSTOLIC BLOOD PRESSURE: 136 MMHG | HEART RATE: 56 BPM | HEIGHT: 62 IN | TEMPERATURE: 97.3 F | OXYGEN SATURATION: 97 % | RESPIRATION RATE: 16 BRPM | BODY MASS INDEX: 25.95 KG/M2 | WEIGHT: 141 LBS | DIASTOLIC BLOOD PRESSURE: 50 MMHG

## 2017-11-14 DIAGNOSIS — M79.2 NEURALGIA OF LEFT THIGH: Primary | ICD-10-CM

## 2017-11-14 DIAGNOSIS — M48.061 SPINAL STENOSIS OF LUMBAR REGION, UNSPECIFIED WHETHER NEUROGENIC CLAUDICATION PRESENT: ICD-10-CM

## 2017-11-14 DIAGNOSIS — M79.652 PAIN OF LEFT THIGH: ICD-10-CM

## 2017-11-14 RX ORDER — PREDNISONE 10 MG/1
10 TABLET ORAL 2 TIMES DAILY
Qty: 10 TAB | Refills: 0 | Status: SHIPPED | OUTPATIENT
Start: 2017-11-14 | End: 2017-11-19

## 2017-11-14 RX ORDER — CHLORTHALIDONE 25 MG/1
25 TABLET ORAL 2 TIMES DAILY
Qty: 180 TAB | Refills: 3 | Status: SHIPPED | OUTPATIENT
Start: 2017-11-14 | End: 2018-08-03 | Stop reason: SDUPTHER

## 2017-11-14 RX ORDER — FLUTICASONE PROPIONATE 50 MCG
2 SPRAY, SUSPENSION (ML) NASAL
COMMUNITY

## 2017-11-14 NOTE — PROGRESS NOTES
Pt states she has numbness and pain in left thigh for the last 5-6 days. States the pain started first and then the numbness. States the pain started in the right groin area and radiate down to the lower thigh. States it worsen with walking.           Chief Complaint   Patient presents with    Numbness     pt states she has numbness and pain in Left thigh         CMP 10/17/17  Lipid 10/17/17  A1C 10/17/17  Micro albumin 10/17/17  Mammogram 3/20/17  Colonoscopy 5/2/16 Dr. Didi Balderas recommended 2 yr f/u  Eye exam 6/9/17

## 2017-11-14 NOTE — MR AVS SNAPSHOT
Visit Information Date & Time Provider Department Dept. Phone Encounter #  
 11/14/2017 11:00 AM James Murrell MD [facility] [phone] [id number] Your Appointments 3/14/2018 10:45 AM  
ROUTINE CARE with James Murrell MD  
[facility]) Appt Note: routine follow up  
 [address] [facility] [id number] [phone] [address] Upcoming Health Maintenance Date Due COLONOSCOPY 5/4/2018 HEMOGLOBIN A1C Q6M 4/17/2018 MEDICARE YEARLY EXAM 5/19/2018 FOOT EXAM Q1 5/31/2018 EYE EXAM RETINAL OR DILATED Q1 6/9/2018 MICROALBUMIN Q1 10/17/2018 LIPID PANEL Q1 10/17/2018 GLAUCOMA SCREENING Q2Y 6/9/2019 DTaP/Tdap/Td series (2 - Td) 5/1/2025 Allergies as of 11/14/2017  Review Complete On: 11/14/2017 By: James Murrell MD  
  
 Severity Noted Reaction Type Reactions Pcn [Penicillins] High 06/01/2010    Swelling  
 tongue Bactrim [Sulfamethoxazole-trimethoprim]  06/01/2010    Diarrhea Ceftin [Cefuroxime Axetil]  06/01/2010    Diarrhea Hydralazine  07/11/2012   Intolerance Other (comments) edema Sulfa (Sulfonamide Antibiotics)  06/01/2010    Itching Current Immunizations  Reviewed on 11/14/2017 Name Date Influenza High Dose Vaccine PF 10/28/2016, 11/5/2015 Influenza Vaccine 10/3/2017, 10/22/2014, 11/1/2013 Influenza Vaccine Split 9/12/2012, 9/13/2011, 12/7/2010 Pneumococcal Conjugate (PCV-13) 5/1/2015 Pneumococcal Vaccine (Pcv) 9/21/2009 Tdap 5/1/2015 Reviewed by James Murrell MD on 11/14/2017 at 11:51 AM  
You Were Diagnosed With   
  
 Codes Comments Numbness and tingling of left leg    -  Primary ICD-10-CM: R20.0, R20.2 ICD-9-CM: 782.0 Pain of left thigh     ICD-10-CM: G71.573 ICD-9-CM: 729.5 Vitals BP Pulse Temp Resp Height(growth percentile) Weight(growth percentile) 148/55 (BP 1 Location: Right arm, BP Patient Position: Sitting) (!) 56 97.3 °F (36.3 °C) (Oral) 16 5' 2\" (1.575 m) 141 lb (64 kg) SpO2 BMI OB Status Smoking Status 97% 25.79 kg/m2 Hysterectomy Former Smoker Vitals History BMI and BSA Data Body Mass Index Body Surface Area 25.79 kg/m 2 1.67 m 2 Preferred Pharmacy Pharmacy Name Phone [facility] [address] [place] [address] [phone] Your Updated Medication List  
  
   
This list is accurate as of: 11/14/17 12:55 PM.  Always use your most recent med list.  
  
  
  
  
 allopurinol 100 mg tablet Commonly known as:  ZYLOPRIM  
TAKE ONE TABLET BY MOUTH EVERY DAY  
  
 amLODIPine 10 mg tablet Commonly known as:  Voncile Beaver TAKE ONE TABLET BY MOUTH ONCE EVERY DAY  
  
 chlorthalidone 25 mg tablet Commonly known as:  Valeda Amandeep Take 1 Tab by mouth two (2) times a day. cloNIDine HCl 0.3 mg tablet Commonly known as:  CATAPRES  
TAKE ONE HALF (1/2) TABLET( S) DAILY AFTER LUNCH AND T LOLLY 1 TABLET EVERY NIGHT AT BEDTIME  
  
 FISH OIL 1,000 mg Cap Generic drug:  omega-3 fatty acids-vitamin e Take 1 Tab by mouth two (2) times a day. * FLONASE 50 mcg/actuation nasal spray Generic drug:  fluticasone 2 Sprays by Both Nostrils route. Daily as needed * fluticasone 50 mcg/actuation nasal spray Commonly known as:  Marsh Fails 2 Sprays by Nasal route daily. Administer to right and left nostril.  
  
 metFORMIN  mg tablet Commonly known as:  GLUCOPHAGE XR Take 500 mg by mouth daily (with dinner). metoprolol tartrate 100 mg IR tablet Commonly known as:  LOPRESSOR  
TAKE ONE TABLET BY MOUTH TWICE A DAY  
  
 minoxidil 10 mg tablet Commonly known as:  Rosalene Loosen Take 5 mg by mouth daily. polyethylene glycol 17 gram packet Commonly known as:  Irma  Take 17 g by mouth daily as needed. predniSONE 10 mg tablet Commonly known as:  Berle Pouch Take 1 Tab by mouth two (2) times a day for 5 days. PROCRIT INJECTION  
1,000 Units by Injection route every fourteen (14) days. Depending on blood count  
  
 rosuvastatin 5 mg tablet Commonly known as:  CRESTOR  
TAKE ONE (1) TABLET(S) BY MOUTH N IGHTLY. spironolactone 25 mg tablet Commonly known as:  ALDACTONE Take 1 Tab by mouth two (2) times a day. traMADol 50 mg tablet Commonly known as:  ULTRAM  
Take 1 Tab by mouth every six (6) hours as needed for Pain. Max Daily Amount: 200 mg. VITAMIN B-12 100 mcg tablet Generic drug:  cyanocobalamin Take 100 mcg by mouth daily. VITAMIN C 500 mg tablet Generic drug:  ascorbic acid (vitamin C) Take 500 mg by mouth two (2) times a day. VITAMIN D3 1,000 unit tablet Generic drug:  cholecalciferol Take 1,000 Units by mouth daily. * Notice: This list has 2 medication(s) that are the same as other medications prescribed for you. Read the directions carefully, and ask your doctor or other care provider to review them with you. Prescriptions Sent to Pharmacy Refills  
 chlorthalidone (HYGROTEN) 25 mg tablet 3 Sig: Take 1 Tab by mouth two (2) times a day. Class: Normal  
 Pharmacy: [address] Ph #: [phone] Route: Oral  
 predniSONE (DELTASONE) 10 mg tablet 0 Sig: Take 1 Tab by mouth two (2) times a day for 5 days. Class: Normal  
 Pharmacy: [address] Ph #: [phone] Route: Oral  
  
To-Do List   
 11/14/2017 Imaging:  XR FEMUR LT 2 V   
  
 12/01/2017 2:00 PM  
  Appointment with [address] [facility] 1 at [facility] ([phone]) Shower or bathe using soap and water.   Do not use deodorant, powder, perfumes, or lotion the day of your exam.  If your prior mammograms were not performed at [facility] 6 please bring films with you or forward prior images 2 days before your procedure. Check in at registration 15min before your appointment time unless you were instructed to do otherwise. A script is not necessary, but if you have one, please bring it on the day of the mammogram or have it faxed to the department. [facility] [phone] [facility]  [phone] [facility] [address]  [phone] [facility] [phone] [facility] [phone] Radha Corral [phone]  
  
 01/31/2018 12:45 PM  
  Appointment with [facility] 1 at [facility] ([phone]) GENERAL INSTRUCTIONS:  1. You MUST bring a  with you in order to receive sedation. 2. A nurse form MRI/Radiology will call with instructions and arrival time. Please follow their time and instructions only. 3. If you have any medical implants or devices, please bring associated medical card with you. FASTING GUIDELINES:  NPO Nothing by mouth after midnight unless otherwise instructed by the MRI/Radiology Nurse. Introducing [facility] [place] [facility] SERVICES! Rayne Almanzar introduces [employer] patient portal. Now you can access parts of your medical record, email your doctor's office, and request medication refills online. 1. In your internet browser, go to [email or web address] 2. Click on the First Time User? Click Here link in the Sign In box. You will see the New Member Sign Up page. 3. Enter your [employer] Access Code exactly as it appears below. You will not need to use this code after youve completed the sign-up process. If you do not sign up before the expiration date, you must request a new code. · [employer] Access Code: [email or web address] Expires: 1/15/2018  3:01 PM 
 
4. Enter the last four digits of your Social Security Number (xxxx) and Date of Birth (mm/dd/yyyy) as indicated and click Submit. You will be taken to the next sign-up page. 5. Create a MyChart ID.  This will be your [employer] login ID and cannot be changed, so think of one that is secure and easy to remember. 6. Create a LookTracker password. You can change your password at any time. 7. Enter your Password Reset Question and Answer. This can be used at a later time if you forget your password. 8. Enter your e-mail address. You will receive e-mail notification when new information is available in [address] 9. Click Sign Up. You can now view and download portions of your medical record. 10. Click the Download Summary menu link to download a portable copy of your medical information. If you have questions, please visit the Frequently Asked Questions section of the LookTracker website. Remember, LookTracker is NOT to be used for urgent needs. For medical emergencies, dial 911. Now available from your iPhone and Android! Please provide this summary of care documentation to your next provider. Your primary care clinician is listed as Vickie Rudds. If you have any questions after today's visit, please call [phone].

## 2017-12-01 ENCOUNTER — HOSPITAL ENCOUNTER (OUTPATIENT)
Dept: MAMMOGRAPHY | Age: 82
Discharge: HOME | End: 2017-12-01
Attending: FAMILY MEDICINE
Payer: MEDICARE

## 2017-12-01 DIAGNOSIS — Z12.31 VISIT FOR SCREENING MAMMOGRAM: ICD-10-CM

## 2017-12-01 PROCEDURE — 77067 SCR MAMMO BI INCL CAD: CPT

## 2017-12-02 ENCOUNTER — HOSPITAL ENCOUNTER (OUTPATIENT)
Dept: MRI IMAGING | Age: 82
Discharge: HOME | End: 2017-12-02
Attending: ORTHOPAEDIC SURGERY
Payer: MEDICARE

## 2017-12-02 DIAGNOSIS — M48.061 LUMBAR STENOSIS: ICD-10-CM

## 2017-12-02 PROCEDURE — 72148 MRI LUMBAR SPINE W/O DYE: CPT

## 2017-12-06 RX ORDER — AMLODIPINE BESYLATE 10 MG/1
TABLET ORAL
Qty: 90 TAB | Refills: 3 | Status: SHIPPED | OUTPATIENT
Start: 2017-12-06 | End: 2018-10-13 | Stop reason: SDUPTHER

## 2017-12-06 RX ORDER — ALLOPURINOL 100 MG/1
TABLET ORAL
Qty: 90 TAB | Refills: 3 | Status: SHIPPED | OUTPATIENT
Start: 2017-12-06 | End: 2018-10-13 | Stop reason: SDUPTHER

## 2017-12-06 NOTE — TELEPHONE ENCOUNTER
----- Message from Tee Hager sent at 12/6/2017  9:57 AM EST -----  Regarding: Dr. Tushar Chao  Pt is requesting a refill on her amlodipine 10mg. She stated she has enough to last her for a week, Pt  also needs \"allopurinol\" 100mg. Pt is using [facility] and her best contact is [phone].

## 2017-12-20 ENCOUNTER — OFFICE VISIT (OUTPATIENT)
Dept: FAMILY MEDICINE CLINIC | Age: 82
End: 2017-12-20

## 2017-12-20 VITALS
SYSTOLIC BLOOD PRESSURE: 124 MMHG | OXYGEN SATURATION: 95 % | BODY MASS INDEX: 25.76 KG/M2 | TEMPERATURE: 97.3 F | WEIGHT: 140 LBS | DIASTOLIC BLOOD PRESSURE: 50 MMHG | HEART RATE: 54 BPM | HEIGHT: 62 IN | RESPIRATION RATE: 18 BRPM

## 2017-12-20 DIAGNOSIS — M48.061 SPINAL STENOSIS OF LUMBAR REGION WITHOUT NEUROGENIC CLAUDICATION: Primary | ICD-10-CM

## 2017-12-20 DIAGNOSIS — M79.2 NEURALGIA OF LEFT THIGH: ICD-10-CM

## 2017-12-20 RX ORDER — METFORMIN HYDROCHLORIDE 500 MG/1
500 TABLET, EXTENDED RELEASE ORAL
Qty: 90 TAB | Refills: 3 | Status: SHIPPED | OUTPATIENT
Start: 2017-12-20 | End: 2018-12-07 | Stop reason: SDUPTHER

## 2017-12-20 RX ORDER — MINOXIDIL 10 MG/1
5 TABLET ORAL DAILY
Qty: 45 TAB | Refills: 3 | Status: SHIPPED | OUTPATIENT
Start: 2017-12-20 | End: 2018-10-13 | Stop reason: SDUPTHER

## 2017-12-20 RX ORDER — METOPROLOL TARTRATE 100 MG/1
TABLET ORAL
Qty: 180 TAB | Refills: 3 | Status: SHIPPED | OUTPATIENT
Start: 2017-12-20 | End: 2018-11-20 | Stop reason: SDUPTHER

## 2017-12-20 RX ORDER — SPIRONOLACTONE 25 MG/1
25 TABLET ORAL 2 TIMES DAILY
Qty: 180 TAB | Refills: 3 | Status: SHIPPED | OUTPATIENT
Start: 2017-12-20 | End: 2018-10-13 | Stop reason: SDUPTHER

## 2017-12-20 RX ORDER — GABAPENTIN 300 MG/1
300 CAPSULE ORAL
Qty: 30 CAP | Refills: 1
Start: 2017-12-20 | End: 2018-03-15

## 2017-12-20 NOTE — PROGRESS NOTES
HISTORY OF PRESENT ILLNESS  Nick Cruz is a 80 y.o. female. HPI   Follow up on chronic medical problems. Cardiovascular Review:  The patient has hypertension, ELEAZAR and hyperlipidemia. Diet and Lifestyle: generally follows a low fat low cholesterol diet, generally follows a low sodium diet, follows a diabetic diet regularly  Home BP Monitoring: is well controlled at home, ranging 130s's/80's. Continues to see Dr. Gabi Bernstein at [facility] for BP. Pertinent ROS: taking medications as instructed, no medication side effects noted, no TIA's, no chest pain on exertion, no dyspnea on exertion, no swelling of ankles. Diabetes Mellitus:  She has diabetes mellitus. Diabetic ROS - diabetic diet compliance: compliant most of the time, home glucose monitoring: is performed regularly, fasting values range low 100s,  Pt does not have BS log with her today, further diabetic ROS: no polyuria or polydipsia, no chest pain, dyspnea or TIA's, no numbness, tingling or pain in extremities, no unusual visual symptoms, no hypoglycemia. Lab review: orders written for new lab studies as appropriate; see orders. Chronic anemia and multiple myeloma in remission is being followed by hematology. C/o abd bloating. Feels like stomach gets swollen. No abd pain. No nausea or vomiting. BMs normal.  Has not noticed any food trigger. Sometime stomach is so bloated that he can not fasten her pants. Has had sx over the past month. C/o low back pain for the past few weeks. Sx comes and goes. Has radiation of pain down both legs. Has had sciatica in the past but seems worse over the last few weeks. No new injury noted. Pain is 5-6/10 when at its worse. Has only been taking occassional aleve for the pain which does help. C/o left breast enlargement over the past month. Noticed d/t her bra fitting differently. No pain and has not felt any lumps. Just feel uncomfortable.         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    Essential hypertension, malignant I10    Mixed hyperlipidemia E78.2    ELEAZAR on CPAP G47.33, Z99.89    Encounter for medication monitoring Z51.81    Type 2 diabetes mellitus without complication (HCC) I43.7    ELEAZAR (obstructive sleep apnea) G47.33       Current Outpatient Prescriptions   Medication Sig Dispense Refill    amLODIPine (NORVASC) 10 mg tablet TAKE ONE TABLET BY MOUTH ONCE EVERY DAY 90 Tab 3    allopurinol (ZYLOPRIM) 100 mg tablet TAKE ONE TABLET BY MOUTH EVERY DAY 90 Tab 3    fluticasone (FLONASE) 50 mcg/actuation nasal spray 2 Sprays by Both Nostrils route. Daily as needed      chlorthalidone (HYGROTEN) 25 mg tablet Take 1 Tab by mouth two (2) times a day. 180 Tab 3    cloNIDine HCl (CATAPRES) 0.3 mg tablet TAKE ONE HALF (1/2) TABLET( S) DAILY AFTER LUNCH AND T LOLLY 1 TABLET EVERY NIGHT AT BEDTIME 135 Tab 3    spironolactone (ALDACTONE) 25 mg tablet Take 1 Tab by mouth two (2) times a day. 180 Tab 3    rosuvastatin (CRESTOR) 5 mg tablet TAKE ONE (1) TABLET(S) BY MOUTH N IGHTLY. 90 Tab 3    metoprolol tartrate (LOPRESSOR) 100 mg IR tablet TAKE ONE TABLET BY MOUTH TWICE A  Tab 3    traMADol (ULTRAM) 50 mg tablet Take 1 Tab by mouth every six (6) hours as needed for Pain. Max Daily Amount: 200 mg. 30 Tab 1    minoxidil (LONITEN) 10 mg tablet Take 5 mg by mouth daily.  metFORMIN ER (GLUCOPHAGE XR) 500 mg tablet Take 500 mg by mouth daily (with dinner).  polyethylene glycol (MIRALAX) 17 gram packet Take 17 g by mouth daily as needed.  cyanocobalamin (VITAMIN B-12) 100 mcg tablet Take 100 mcg by mouth daily.  EPOETIN BRYAN (PROCRIT IJ) 1,000 Units by Injection route every fourteen (14) days.  Depending on blood count       fluticasone (FLONASE) 50 mcg/Actuation nasal spray 2 Sprays by Nasal route daily. Administer to right and left nostril. (Patient taking differently: 2 Sprays by Nasal route. Administer to right and left nostril.) 1 Bottle 6    omega-3 fatty acids-vitamin e (FISH OIL) 1,000 mg Cap Take 1 Tab by mouth two (2) times a day.  ascorbic acid (VITAMIN C) 500 mg tablet Take 500 mg by mouth two (2) times a day.  cholecalciferol, vitamin d3, (VITAMIN D) 1,000 unit tablet Take 1,000 Units by mouth daily. Allergies   Allergen Reactions    Pcn [Penicillins] Swelling     tongue    Bactrim [Sulfamethoxazole-Trimethoprim] Diarrhea    Ceftin [Cefuroxime Axetil] Diarrhea    Hydralazine Other (comments)     edema    Sulfa (Sulfonamide Antibiotics) Itching         Past Medical History:   Diagnosis Date    Anemia NEC     Benign neoplasm of adrenal gland     Chronic anemia 6/1/2010    Constipation     on a daily stool softener which pt states helps as of 4/27/16    Diabetes ([address]     WALKER (dyspnea on exertion)     as of 4/27/16 pt states this is her baseline and is not getting any worse    Goiter 2011    as of 4/27/16 Pt states \"I am not even sure I have it\".   Pt denies any problems    Gout 6/1/2010    as of 4/27/16 pt denies any sx    HTN (hypertension) 6/1/2010    followed by cardiology Dr Sheila Cavanaugh  [phone]    Multiple myeloma, without mention of having achieved remission Dx 11/1/99    follow[facility] by [facility]    Pure hypercholesterolemia 6/1/2010    Sleep apnea 6/1/2010    CPAP    Spinal stenosis 6/1/2010         Past Surgical History:   Procedure Laterality Date    HX HYSTERECTOMY  1970s    HX ORTHOPAEDIC  2005    left hand-trigger finger    HX ORTHOPAEDIC  2007    right hand- trigger finger    IN COLONOSCOPY FLX DX W/COLLJ SPEC WHEN PFRMD  08/10/2010             Family History   Problem Relation Age of Onset    Hypertension Mother     No Known Problems Father     Cancer Sister 52     colon     Diabetes Sister     Kidney Disease Sister     Hypertension Sister    [facility] Elevated Lipids Sister        Social History   Substance Use Topics    Smoking status: Former Smoker     Quit date: 1/1/1980    Smokeless tobacco: Never Used    Alcohol use No        Lab Results   Component Value Date/Time    WBC 5.6 10/17/2017 03:41 PM    WBC 4.9 06/12/2012 09:40 AM    HGB 9.9 10/17/2017 03:41 PM    HCT 31.0 10/17/2017 03:41 PM    PLATELET 035 89/83/6612 03:41 PM    MCV 91 10/17/2017 03:41 PM       Lab Results   Component Value Date/Time    Cholesterol, total 112 10/17/2017 03:41 PM    HDL Cholesterol 36 10/17/2017 03:41 PM    LDL, calculated 51 10/17/2017 03:41 PM    Triglyceride 127 10/17/2017 03:41 PM    CHOL/HDL Ratio 2.8 06/02/2010 09:57 AM       Lab Results   Component Value Date/Time    TSH 2.860 05/11/2011 09:29 AM      Lab Results   Component Value Date/Time    Sodium 139 10/17/2017 03:41 PM    Potassium 4.1 10/17/2017 03:41 PM    Chloride 101 10/17/2017 03:41 PM    CO2 24 10/17/2017 03:41 PM    Anion gap 9 02/26/2010 04:04 PM    Glucose 105 10/17/2017 03:41 PM    BUN 23 10/17/2017 03:41 PM    Creatinine 1.16 10/17/2017 03:41 PM    BUN/Creatinine ratio 20 10/17/2017 03:41 PM    GFR est AA 51 10/17/2017 03:41 PM    GFR est non-AA 44 10/17/2017 03:41 PM    Calcium 10.0 10/17/2017 03:41 PM    Bilirubin, total 0.5 10/17/2017 03:41 PM    ALT (SGPT) 9 10/17/2017 03:41 PM    AST (SGOT) 13 10/17/2017 03:41 PM    Alk. phosphatase 51 10/17/2017 03:41 PM    Protein, total 9.4 10/17/2017 03:41 PM    Albumin 4.2 10/17/2017 03:41 PM    Globulin 5.6 06/02/2010 09:57 AM    A-G Ratio 0.8 10/17/2017 03:41 PM      Lab Results   Component Value Date/Time    Hemoglobin A1c 7.4 10/28/2016 10:49 AM    Hemoglobin A1c (POC) 6.2 10/17/2017 03:41 PM    Hemoglobin A1c, External 6.4 10/14/2015         Review of Systems   Constitutional: Negative for malaise/fatigue. HENT: Negative for congestion. Eyes: Negative for blurred vision.    Respiratory: Negative for cough and shortness of breath. Cardiovascular: Negative for chest pain, palpitations and leg swelling. Gastrointestinal: Negative for abdominal pain, constipation and heartburn. Genitourinary: Negative for dysuria, frequency and urgency. Musculoskeletal: Positive for back pain. Negative for joint pain. Neurological: Negative for dizziness, tingling and headaches. Endo/Heme/Allergies: Negative for environmental allergies. Psychiatric/Behavioral: Negative for depression. The patient does not have insomnia. Physical Exam   Constitutional: She appears well-developed and well-nourished. /60  Pulse (!) 52  Temp 97.2 °F (36.2 °C) (Oral)   Resp 16  Ht 5' 2\" (1.575 m)  Wt 154 lb (69.9 kg)  SpO2 98%  BMI 28.17 kg/m2     HENT:   Right Ear: Tympanic membrane and ear canal normal.   Left Ear: Tympanic membrane and ear canal normal.   Nose: No mucosal edema or rhinorrhea. Mouth/Throat: Oropharynx is clear and moist and mucous membranes are normal.   Neck: Normal range of motion. Neck supple. No thyromegaly present. Cardiovascular: Normal rate and regular rhythm. No murmur heard. Pulmonary/Chest: Effort normal and breath sounds normal. Right breast exhibits no inverted nipple, no mass, no nipple discharge, no skin change and no tenderness (left breast is larger than right. ). Left breast exhibits no inverted nipple, no mass, no nipple discharge, no skin change and no tenderness. Breasts are asymmetrical.   Abdominal: Soft. Normal appearance and bowel sounds are normal. She exhibits no distension and no ascites. There is no hepatosplenomegaly. There is no tenderness. There is no rigidity, no rebound and no guarding. No hernia. Musculoskeletal: Normal range of motion. She exhibits no edema. Lumbar back: She exhibits tenderness and bony tenderness. She exhibits normal range of motion, no pain and no spasm. Lymphadenopathy:     She has no cervical adenopathy.      She has no axillary adenopathy. Neurological: She has normal strength and normal reflexes. No sensory deficit. Coordination and gait normal.   Skin: Skin is warm and dry. Psychiatric: She has a normal mood and affect. Nursing note and vitals reviewed. ASSESSMENT and Moizl Glenda was seen today for hypertension and diabetes. Diagnoses and all orders for this visit:    Essential hypertension, malignant  Stable     Type 2 diabetes mellitus without complication, without long-term current use of insulin (HCC)  -     AMB POC GLUCOSE, QUANTITATIVE, BLOOD  -     AMB POC HEMOGLOBIN A1C    Mixed hyperlipidemia  -     LIPID PANEL    ELEAZAR on CPAP    Chronic anemia  -     AMB POC COMPLETE CBC, AUTOMATED    Bilateral sciatica  -     REFERRAL TO ORTHOPEDIC SURGERY  -     XR SPINE LUMBAR 2 OR 3 VIEWS; Future  -     traMADol (ULTRAM) 50 mg tablet; Take 1 Tab by mouth every six (6) hours as needed for Pain. Max Daily Amount: 200 mg.  -     predniSONE (DELTASONE) 10 mg tablet; Take 1 Tab by mouth two (2) times a day for 5 days. Engorgement of breast  -     RUPERTO MAMMO LT DX INCL CAD; Future  -     US BREAST LT LIMITED=<3 QUAD; Future    Abdominal bloating  -     US ABD COMP; Future    Encounter for medication monitoring  -     METABOLIC PANEL, COMPREHENSIVE    Normal body mass index (BMI)        Follow-up Disposition: Not on File  reviewed diet, exercise and weight control  cardiovascular risk and specific lipid/LDL goals reviewed  reviewed medications and side effects in detail  specific diabetic recommendations: low cholesterol diet, weight control and daily exercise discussed, home glucose monitoring emphasized, foot care discussed and Podiatry visits discussed, annual eye examinations at Ophthalmology discussed and glycohemoglobin and other lab monitoring discussed     I have discussed diagnosis listed in this note with pt and/or family.  I have discussed treatment plans and options and the risk/benefit analysis of those options, including safe use of medications and possible medication side effects. Through the use of shared decision making we have agreed to the above plan. The patient has received an after-visit summary and questions were answered concerning future plans and follow up. Advise pt of any urgent changes then to proceed to the ER.

## 2017-12-20 NOTE — MR AVS SNAPSHOT
Visit Information Date & Time Provider Department Dept. Phone Encounter #  
 12/20/2017 11:45 AM Madiha Kumari MD [facility] [phone] [id number] Follow-up Instructions Return in about 3 months (around 3/20/2018). Your Appointments 3/14/2018 10:45 AM  
ROUTINE CARE with Madiha Kumari MD  
[facility]) Appt Note: routine follow up  
 [address] [facility] [address] [phone] [address] [place] [address] [address] Upcoming Health Maintenance Date Due COLONOSCOPY 5/4/2018 HEMOGLOBIN A1C Q6M 4/17/2018 MEDICARE YEARLY EXAM 5/19/2018 FOOT EXAM Q1 5/31/2018 EYE EXAM RETINAL OR DILATED Q1 6/9/2018 MICROALBUMIN Q1 10/17/2018 LIPID PANEL Q1 10/17/2018 GLAUCOMA SCREENING Q2Y 6/9/2019 DTaP/Tdap/Td series (2 - Td) 5/1/2025 Allergies as of 12/20/2017  Review Complete On: 12/20/2017 By: Vianney Burnham LPN Severity Noted Reaction Type Reactions Pcn [Penicillins] High 06/01/2010    Swelling  
 tongue Bactrim [Sulfamethoxazole-trimethoprim]  06/01/2010    Diarrhea Ceftin [Cefuroxime Axetil]  06/01/2010    Diarrhea Hydralazine  07/11/2012   Intolerance Other (comments) edema Sulfa (Sulfonamide Antibiotics)  06/01/2010    Itching Current Immunizations  Reviewed on 12/20/2017 Name Date Influenza High Dose Vaccine PF 10/28/2016, 11/5/2015 Influenza Vaccine 10/3/2017, 10/22/2014, 11/1/2013 Influenza Vaccine Split 9/12/2012, 9/13/2011, 12/7/2010 Pneumococcal Conjugate (PCV-13) 5/1/2015 Pneumococcal Vaccine (Pcv) 9/21/2009 Tdap 5/1/2015 Reviewed by Madiha Kumari MD on 12/20/2017 at 12:52 PM  
Vitals BP Pulse Temp Resp Height(growth percentile) Weight(growth percentile) 124/50 (!) 54 97.3 °F (36.3 °C) (Oral) 18 5' 2\" (1.575 m) 140 lb (63.5 kg) SpO2 BMI OB Status Smoking Status 95% 25.61 kg/m2 Hysterectomy Former Smoker Vitals History BMI and BSA Data Body Mass Index Body Surface Area  
 25.61 kg/m 2 1.67 m 2 Preferred Pharmacy Pharmacy Name Phone [facility], [address] [place] [address] [address] Jhonny Cannon Your Updated Medication List  
  
   
This list is accurate as of: 12/20/17 12:56 PM.  Always use your most recent med list.  
  
  
  
  
 allopurinol 100 mg tablet Commonly known as:  ZYLOPRIM  
TAKE ONE TABLET BY MOUTH EVERY DAY  
  
 amLODIPine 10 mg tablet Commonly known as:  Uyen Citron TAKE ONE TABLET BY MOUTH ONCE EVERY DAY  
  
 chlorthalidone 25 mg tablet Commonly known as:  Marnette Castellani Take 1 Tab by mouth two (2) times a day. cloNIDine HCl 0.3 mg tablet Commonly known as:  CATAPRES  
TAKE ONE HALF (1/2) TABLET( S) DAILY AFTER LUNCH AND T LOLLY 1 TABLET EVERY NIGHT AT BEDTIME  
  
 FISH OIL 1,000 mg Cap Generic drug:  omega-3 fatty acids-vitamin e Take 1 Tab by mouth two (2) times a day. FLONASE 50 mcg/actuation nasal spray Generic drug:  fluticasone 2 Sprays by Both Nostrils route. Daily as needed  
  
 metFORMIN  mg tablet Commonly known as:  GLUCOPHAGE XR Take 1 Tab by mouth daily (with dinner). metoprolol tartrate 100 mg IR tablet Commonly known as:  LOPRESSOR  
TAKE ONE TABLET BY MOUTH TWICE A DAY  
  
 minoxidil 10 mg tablet Commonly known as:  Jesus Coats Take 0.5 Tabs by mouth daily. polyethylene glycol 17 gram packet Commonly known as:  Jh Boer Take 17 g by mouth daily as needed. PROCRIT INJECTION  
1,000 Units by Injection route every fourteen (14) days. Depending on blood count  
  
 rosuvastatin 5 mg tablet Commonly known as:  CRESTOR  
TAKE ONE (1) TABLET(S) BY MOUTH N IGHTLY. spironolactone 25 mg tablet Commonly known as:  ALDACTONE Take 1 Tab by mouth two (2) times a day. traMADol 50 mg tablet Commonly known as:  ULTRAM  
Take 1 Tab by mouth every six (6) hours as needed for Pain. Max Daily Amount: 200 mg. VITAMIN B-12 100 mcg tablet Generic drug:  cyanocobalamin Take 100 mcg by mouth daily. VITAMIN C 500 mg tablet Generic drug:  ascorbic acid (vitamin C) Take 500 mg by mouth two (2) times a day. VITAMIN D3 1,000 unit tablet Generic drug:  cholecalciferol Take 1,000 Units by mouth daily. Prescriptions Sent to Pharmacy Refills  
 spironolactone (ALDACTONE) 25 mg tablet 3 Sig: Take 1 Tab by mouth two (2) times a day. Class: Normal  
 Pharmacy: [address] [place] [address] . [address] Ph #: [phone] Route: Oral  
 minoxidil (LONITEN) 10 mg tablet 3 Sig: Take 0.5 Tabs by mouth daily. Class: Normal  
 Pharmacy: [address] [place] [address] [place] . [address] Ph #: [phone] Route: Oral  
 metoprolol tartrate (LOPRESSOR) 100 mg IR tablet 3 Sig: TAKE ONE TABLET BY MOUTH TWICE A DAY Class: Normal  
 Pharmacy: [address] [place] [address] . [address] Ph #: [phone]  
 metFORMIN ER (GLUCOPHAGE XR) 500 mg tablet 3 Sig: Take 1 Tab by mouth daily (with dinner). Class: Normal  
 Pharmacy: [address] [place], [place] [address]er[place] . [address] Ph #: [phone] Route: Oral  
  
Follow-up Instructions Return in about 3 months (around 3/20/2018). Introducing [facility]! Daisy Chung introduces [employer] patient portal. Now you can access parts of your medical record, email your doctor's office, and request medication refills online. 1. In your internet browser, go to [email or web address] 2. Click on the First Time User? Click Here link in the Sign In box. You will see the New Member Sign Up page. 3. Enter your [employer] Access Code exactly as it appears below.  You will not need to use this code after youve completed the sign-up process. If you do not sign up before the expiration date, you must request a new code. · [employer] Access Code: [email or web address] Expires: 1/15/2018  3:01 PM 
 
4. Enter the last four digits of your Social Security Number (xxxx) and Date of Birth (mm/dd/yyyy) as indicated and click Submit. You will be taken to the next sign-up page. 5. Create a [employer] ID. This will be your [employer] login ID and cannot be changed, so think of one that is secure and easy to remember. 6. Create a [employer]got[employer] password. You can change your password at any time. 7. Enter your Password Reset Question and Answer. This can be used at a later time if you forget your password. 8. Enter your e-mail address. You will receive e-mail notification when new information is available in [address] 9. Click Sign Up. You can now view and download portions of your medical record. 10. Click the Download Summary menu link to download a portable copy of your medical information. If you have questions, please visit the Frequently Asked Questions section of the [employer] website. Remember, [employer] is NOT to be used for urgent needs. For medical emergencies, dial 911. Now available from your iPhone and Android! Please provide this summary of care documentation to your next provider. Your primary care clinician is listed as Vickie Rudds. If you have any questions after today's visit, please call [phone].

## 2017-12-20 NOTE — PROGRESS NOTES
Pt states she is here for f/u on HTN, Diabetes and back pain.       Micro albumin 10/17/17    Mammogram 12/1/17    Colonoscopy 5/2/16 Dr. Fay Jain recommended 2 yr f/u    Eye exam 6/9/17

## 2017-12-20 NOTE — PROGRESS NOTES
HISTORY OF PRESENT ILLNESS  Dina Botello is a 80 y.o. female. HPI   Follow up for c/o numb feeling in the left upper thigh. Took the prednisone that did help for a few days. Then was seen by ortho and got steroid injection in the spine about 2 weeks ago. MRI showed moderate to severe spinal stenosis. The numbness is still about the same. Has some burning pain sensation also. Was started on gabapentin by ortho but pt stopped d/t it making her feel drowsy. Has follow up with ortho for next month. 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    Essential hypertension, malignant I10    Mixed hyperlipidemia E78.2    ELEAZAR on CPAP G47.33, Z99.89    Encounter for medication monitoring Z51.81    Type 2 diabetes mellitus without complication (HCC) D15.0    ELEAZAR (obstructive sleep apnea) G47.33       Current Outpatient Prescriptions   Medication Sig Dispense Refill    spironolactone (ALDACTONE) 25 mg tablet Take 1 Tab by mouth two (2) times a day. 180 Tab 3    minoxidil (LONITEN) 10 mg tablet Take 0.5 Tabs by mouth daily. 45 Tab 3    metoprolol tartrate (LOPRESSOR) 100 mg IR tablet TAKE ONE TABLET BY MOUTH TWICE A  Tab 3    metFORMIN ER (GLUCOPHAGE XR) 500 mg tablet Take 1 Tab by mouth daily (with dinner). 90 Tab 3    amLODIPine (NORVASC) 10 mg tablet TAKE ONE TABLET BY MOUTH ONCE EVERY DAY 90 Tab 3    allopurinol (ZYLOPRIM) 100 mg tablet TAKE ONE TABLET BY MOUTH EVERY DAY 90 Tab 3    fluticasone (FLONASE) 50 mcg/actuation nasal spray 2 Sprays by Both Nostrils route. Daily as needed      chlorthalidone (HYGROTEN) 25 mg tablet Take 1 Tab by mouth two (2) times a day.  180 Tab 3    cloNIDine HCl (CATAPRES) 0.3 mg tablet TAKE ONE HALF (1/2) TABLET( S) DAILY AFTER LUNCH AND T LOLLY 1 TABLET EVERY NIGHT AT BEDTIME 135 Tab 3    rosuvastatin (CRESTOR) 5 mg tablet TAKE ONE (1) TABLET(S) BY MOUTH N IGHTLY. 90 Tab 3    traMADol (ULTRAM) 50 mg tablet Take 1 Tab by mouth every six (6) hours as needed for Pain. Max Daily Amount: 200 mg. 30 Tab 1    polyethylene glycol (MIRALAX) 17 gram packet Take 17 g by mouth daily as needed.  cyanocobalamin (VITAMIN B-12) 100 mcg tablet Take 100 mcg by mouth daily.  EPOETIN BRYAN (PROCRIT IJ) 1,000 Units by Injection route every fourteen (14) days. Depending on blood count       omega-3 fatty acids-vitamin e (FISH OIL) 1,000 mg Cap Take 1 Tab by mouth two (2) times a day.  ascorbic acid (VITAMIN C) 500 mg tablet Take 500 mg by mouth two (2) times a day.  cholecalciferol, vitamin d3, (VITAMIN D) 1,000 unit tablet Take 1,000 Units by mouth daily. Allergies   Allergen Reactions    Pcn [Penicillins] Swelling     tongue    Bactrim [Sulfamethoxazole-Trimethoprim] Diarrhea    Ceftin [Cefuroxime Axetil] Diarrhea    Hydralazine Other (comments)     edema    Sulfa (Sulfonamide Antibiotics) Itching       Past Medical History:   Diagnosis Date    Anemia NEC     Benign neoplasm of adrenal gland     Chronic anemia 6/1/2010    Constipation     on a daily stool softener which pt states helps as of 4/27/16    Diabetes ([address]     WALKER (dyspnea on exertion)     as of 4/27/16 pt states this is her baseline and is not getting any worse    Goiter 2011    as of 4/27/16 Pt states \"I am not even sure I have it\".   Pt denies any problems    Gout 6/1/2010    as of 4/27/16 pt denies any sx    HTN (hypertension) 6/1/2010    followed by cardiology Dr Felipe Narayanan  [address] [place] Multiple myeloma, without mention of having achieved remission Dx 11/1/99    followed by Duran Martel    Pure hypercholesterolemia 6/1/2010    Sleep apnea 6/1/2010    CPAP    Spinal stenosis 6/1/2010       Past Surgical History:   Procedure Laterality Date    HX HYSTERECTOMY  1970s    HX ORTHOPAEDIC  2005    left hand-trigger finger    HX ORTHOPAEDIC  2007    right hand- trigger finger    [place] COLONOSCOPY FLX DX W/COLLJ SPEC WHEN PFRMD  08/10/2010           Family History   Problem Relation Age of Onset    Hypertension Mother     No Known Problems Father     Cancer Sister 52     colon     Diabetes Sister     Kidney Disease Sister     Hypertension Sister     Elevated Lipids Sister        Social History   Substance Use Topics    Smoking status: Former Smoker     Quit date: 1/1/1980    Smokeless tobacco: Never Used    Alcohol use No        Lab Results  Component Value Date/Time   WBC 5.6 10/17/2017 03:41 PM   HGB 9.9 10/17/2017 03:41 PM   HCT 31.0 10/17/2017 03:41 PM   PLATELET 644 39/47/7208 03:41 PM   MCV 91 10/17/2017 03:41 PM     Lab Results  Component Value Date/Time   Cholesterol, total 112 10/17/2017 03:41 PM   HDL Cholesterol 36 10/17/2017 03:41 PM   LDL, calculated 51 10/17/2017 03:41 PM   LDL-C, External 57 10/14/2015   Triglyceride 127 10/17/2017 03:41 PM   CHOL/HDL Ratio 2.8 06/02/2010 09:57 AM     Lab Results   Component Value Date/Time    Sodium 139 10/17/2017 03:41 PM    Potassium 4.1 10/17/2017 03:41 PM    Chloride 101 10/17/2017 03:41 PM    CO2 24 10/17/2017 03:41 PM    Anion gap 9 02/26/2010 04:04 PM    Glucose 105 10/17/2017 03:41 PM    BUN 23 10/17/2017 03:41 PM    Creatinine 1.16 10/17/2017 03:41 PM    BUN/Creatinine ratio 20 10/17/2017 03:41 PM    GFR est AA 51 10/17/2017 03:41 PM    GFR est non-AA 44 10/17/2017 03:41 PM    Calcium 10.0 10/17/2017 03:41 PM    Bilirubin, total 0.5 10/17/2017 03:41 PM    ALT (SGPT) 9 10/17/2017 03:41 PM    AST (SGOT) 13 10/17/2017 03:41 PM    Alk.  phosphatase 51 10/17/2017 03:41 PM    Protein, total 9.4 10/17/2017 03:41 PM    Albumin 4.2 10/17/2017 03:41 PM    Globulin 5.6 06/02/2010 09:57 AM    A-G Ratio 0.8 10/17/2017 03:41 PM      Lab Results   Component Value Date/Time    Hemoglobin A1c 7.4 10/28/2016 10:49 AM    Hemoglobin A1c (POC) 6.2 10/17/2017 03:41 PM    Hemoglobin A1c, External 6.4 10/14/2015       ROS    Physical Exam  Constitutional: She appears well-developed and well-nourished. /50  Pulse (!) 54  Temp 97.3 °F (36.3 °C) (Oral)   Resp 18  Ht 5' 2\" (1.575 m)  Wt 140 lb (63.5 kg)  SpO2 95%  BMI 25.61 kg/m2  Cardiovascular: Normal rate and regular rhythm. No murmur heard. Pulmonary/Chest: Effort normal and breath sounds normal.   Abdominal: Soft. Bowel sounds are normal. There is no tenderness. Musculoskeletal: Normal range of motion. She exhibits tenderness in the lumbar spin region. She exhibits no edema. Left upper leg: She exhibits no tenderness, no bony tenderness and no edema. Lymphadenopathy:     She has no cervical adenopathy. Neurological: She has normal strength and normal reflexes. She displays no atrophy. Sensory deficit: slight decrease to PP sensation in the anterior thigh. She exhibits normal muscle tone. Coordination and gait normal.   Skin: Skin is warm and dry. Psychiatric: She has a normal mood and affect. Nursing note and vitals reviewed. ASSESSMENT and PLAN  Diagnoses and all orders for this visit:    1. Spinal stenosis of lumbar region without neurogenic claudication//  2. Neuralgia of left thigh  -     Resume gabapentin (NEURONTIN) 300 mg capsule; Take 1 Cap by mouth nightly. Follow up with ortho as planned. Follow-up Disposition:  Return in about 3 months (around 3/20/2018). reviewed medications and side effects in detail    I have discussed diagnosis listed in this note with pt and/or family. I have discussed treatment plans and options and the risk/benefit analysis of those options, including safe use of medications and possible medication side effects. Through the use of shared decision making we have agreed to the above plan. The patient has received an after-visit summary and questions were answered concerning future plans and follow up.   Advise pt of any urgent changes then to proceed to the ER.

## 2018-03-14 ENCOUNTER — HOSPITAL ENCOUNTER (OUTPATIENT)
Dept: MRI IMAGING | Age: 83
Discharge: HOME | End: 2018-03-14
Attending: FAMILY MEDICINE
Payer: MEDICARE

## 2018-03-14 ENCOUNTER — TELEPHONE (OUTPATIENT)
Dept: FAMILY MEDICINE CLINIC | Age: 83
End: 2018-03-14

## 2018-03-14 ENCOUNTER — HOSPITAL ENCOUNTER (OUTPATIENT)
Dept: LAB | Age: 83
Discharge: HOME | End: 2018-03-14
Payer: MEDICARE

## 2018-03-14 ENCOUNTER — OFFICE VISIT (OUTPATIENT)
Dept: FAMILY MEDICINE CLINIC | Age: 83
End: 2018-03-14

## 2018-03-14 DIAGNOSIS — R42 DIZZINESS: ICD-10-CM

## 2018-03-14 DIAGNOSIS — E78.2 MIXED HYPERLIPIDEMIA: ICD-10-CM

## 2018-03-14 DIAGNOSIS — G47.33 OSA ON CPAP: ICD-10-CM

## 2018-03-14 DIAGNOSIS — R42 VERTIGO: Primary | ICD-10-CM

## 2018-03-14 DIAGNOSIS — I10 ESSENTIAL HYPERTENSION, MALIGNANT: ICD-10-CM

## 2018-03-14 DIAGNOSIS — H65.92 OTHER NONSUPPURATIVE OTITIS MEDIA OF LEFT EAR, UNSPECIFIED CHRONICITY: ICD-10-CM

## 2018-03-14 DIAGNOSIS — D64.9 CHRONIC ANEMIA: ICD-10-CM

## 2018-03-14 DIAGNOSIS — Z51.81 ENCOUNTER FOR MEDICATION MONITORING: ICD-10-CM

## 2018-03-14 DIAGNOSIS — Z99.89 OSA ON CPAP: ICD-10-CM

## 2018-03-14 DIAGNOSIS — E11.9 TYPE 2 DIABETES MELLITUS WITHOUT COMPLICATION, WITHOUT LONG-TERM CURRENT USE OF INSULIN (HCC): ICD-10-CM

## 2018-03-14 LAB
GLUCOSE POC: 123 MG/DL
HBA1C MFR BLD HPLC: 6.2 %

## 2018-03-14 PROCEDURE — 70553 MRI BRAIN STEM W/O & W/DYE: CPT

## 2018-03-14 PROCEDURE — 80048 BASIC METABOLIC PNL TOTAL CA: CPT

## 2018-03-14 PROCEDURE — 36415 COLL VENOUS BLD VENIPUNCTURE: CPT

## 2018-03-14 PROCEDURE — 80061 LIPID PANEL: CPT

## 2018-03-14 PROCEDURE — 74011250636 HC RX REV CODE- 250/636: Performed by: RADIOLOGY

## 2018-03-14 PROCEDURE — A9575 INJ GADOTERATE MEGLUMI 0.1ML: HCPCS | Performed by: RADIOLOGY

## 2018-03-14 RX ORDER — ONDANSETRON 4 MG/1
4 TABLET, ORALLY DISINTEGRATING ORAL
COMMUNITY
Start: 2018-03-06

## 2018-03-14 RX ORDER — GADOTERATE MEGLUMINE 376.9 MG/ML
14 INJECTION INTRAVENOUS
Status: COMPLETED | OUTPATIENT
Start: 2018-03-14 | End: 2018-03-14

## 2018-03-14 RX ORDER — MECLIZINE HCL 12.5 MG 12.5 MG/1
12.5 TABLET ORAL
Qty: 30 TAB | Refills: 0 | Status: SHIPPED | OUTPATIENT
Start: 2018-03-14 | End: 2018-03-15 | Stop reason: SDUPTHER

## 2018-03-14 RX ORDER — AZITHROMYCIN 250 MG/1
TABLET, FILM COATED ORAL
Qty: 6 TAB | Refills: 0 | Status: SHIPPED | OUTPATIENT
Start: 2018-03-14 | End: 2018-03-15 | Stop reason: SDUPTHER

## 2018-03-14 RX ADMIN — GADOTERATE MEGLUMINE 14 ML: 376.9 INJECTION INTRAVENOUS at 14:44

## 2018-03-14 NOTE — PROGRESS NOTES
HISTORY OF PRESENT ILLNESS  Dioni Keene is a 80 y.o. female. HPI  C/o dizzy spells over the past 2 weeks. Feeling lightheaded and feeling like the room is spinning for periods of time that last for several minutes and then subsides. Occasionally feeling like she is off balance with walking. Has had some slight nausea but vomiting. No focal sx. No headache noted. Does has some slight congestion over the past week and ears have been feeling clogged. Seen at [facility] and given RX for dramamine and zofran which does help some. No chest pain or SOB. 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    Essential hypertension, malignant I10    Mixed hyperlipidemia E78.2    ELEAZAR on CPAP G47.33, Z99.89    Encounter for medication monitoring Z51.81    Type 2 diabetes mellitus without complication (HCC) L92.9       Current Outpatient Prescriptions   Medication Sig Dispense Refill    ondansetron (ZOFRAN ODT) 4 mg disintegrating tablet Take 4 mg by mouth every eight (8) hours as needed for Nausea.  spironolactone (ALDACTONE) 25 mg tablet Take 1 Tab by mouth two (2) times a day. 180 Tab 3    minoxidil (LONITEN) 10 mg tablet Take 0.5 Tabs by mouth daily. 45 Tab 3    metoprolol tartrate (LOPRESSOR) 100 mg IR tablet TAKE ONE TABLET BY MOUTH TWICE A  Tab 3    metFORMIN ER (GLUCOPHAGE XR) 500 mg tablet Take 1 Tab by mouth daily (with dinner). 90 Tab 3    amLODIPine (NORVASC) 10 mg tablet TAKE ONE TABLET BY MOUTH ONCE EVERY DAY 90 Tab 3    allopurinol (ZYLOPRIM) 100 mg tablet TAKE ONE TABLET BY MOUTH EVERY DAY 90 Tab 3    fluticasone (FLONASE) 50 mcg/actuation nasal spray 2 Sprays by Both Nostrils route. Daily as needed      chlorthalidone (HYGROTEN) 25 mg tablet Take 1 Tab by mouth two (2) times a day. 180 Tab 3    cloNIDine HCl (CATAPRES) 0.3 mg tablet TAKE ONE HALF (1/2) TABLET( S) DAILY AFTER LUNCH AND T LOLLY 1 TABLET EVERY NIGHT AT BEDTIME 135 Tab 3    rosuvastatin (CRESTOR) 5 mg tablet TAKE ONE (1) TABLET(S) BY MOUTH N IGHTLY. 90 Tab 3    traMADol (ULTRAM) 50 mg tablet Take 1 Tab by mouth every six (6) hours as needed for Pain. Max Daily Amount: 200 mg. 30 Tab 1    polyethylene glycol (MIRALAX) 17 gram packet Take 17 g by mouth daily as needed.  cyanocobalamin (VITAMIN B-12) 100 mcg tablet Take 100 mcg by mouth daily.  EPOETIN BRYAN (PROCRIT IJ) 1,000 Units by Injection route every fourteen (14) days. Depending on blood count       omega-3 fatty acids-vitamin e (FISH OIL) 1,000 mg Cap Take 1 Tab by mouth two (2) times a day.  ascorbic acid (VITAMIN C) 500 mg tablet Take 500 mg by mouth two (2) times a day.  cholecalciferol, vitamin d3, (VITAMIN D) 1,000 unit tablet Take 1,000 Units by mouth daily.  azithromycin (ZITHROMAX) 250 mg tablet Take 2 tablets today, then take 1 tablet daily 6 Tab 0    meclizine (ANTIVERT) 12.5 mg tablet Take 1 Tab by mouth three (3) times daily as needed. Take as needed for dizziness. 30 Tab 0       Allergies   Allergen Reactions    Pcn [Penicillins] Swelling     tongue    Bactrim [Sulfamethoxazole-Trimethoprim] Diarrhea    Ceftin [Cefuroxime Axetil] Diarrhea    Hydralazine Other (comments)     edema    Sulfa (Sulfonamide Antibiotics) Itching       Past Medical History:   Diagnosis Date    Anemia NEC     Benign neoplasm of adrenal gland     Chronic anemia 6/1/2010    Constipation     on a daily stool softener which pt states helps as of 4/27/16    Diabetes ([address]     WALKER (dyspnea on exertion)     as of 4/27/16 pt states this is her baseline and is not getting any worse    Goiter 2011    as of 4/27/16 Pt states \"I am not even sure I have it\".   Pt denies any problems    Gout 6/1/2010    as of 4/27/16 pt denies any sx    HTN (hypertension) 6/1/2010    followed by cardiology Dr Genevieve Hallman  [phone]    Multiple myeloma, without mention of having achieved remission Dx 11/1/99    followed by Cinthia Raygoza    Pure hypercholesterolemia 6/1/2010    Sleep apnea 6/1/2010    CPAP    Spinal stenosis 6/1/2010       Past Surgical History:   Procedure Laterality Date    HX HYSTERECTOMY  1970s    HX ORTHOPAEDIC  2005    left hand-trigger finger    HX ORTHOPAEDIC  2007    right hand- trigger finger    [place] COLONOSCOPY FLX DX W/COLLJ SPEC WHEN PFRMD  08/10/2010    Esequiel Wilds       Family History   Problem Relation Age of Onset    Hypertension Mother     No Known Problems Father     Cancer Sister 52     colon     Diabetes Sister     Kidney Disease Sister     Hypertension Sister     Elevated Lipids Sister        Social History   Substance Use Topics    Smoking status: Former Smoker     Quit date: 1/1/1980    Smokeless tobacco: Never Used    Alcohol use No        Lab Results  Component Value Date/Time   WBC 5.6 10/17/2017 03:41 PM   HGB 9.9 (L) 10/17/2017 03:41 PM   HCT 31.0 (L) 10/17/2017 03:41 PM   PLATELET 988 05/48/5640 03:41 PM   MCV 91 10/17/2017 03:41 PM     Lab Results  Component Value Date/Time   Cholesterol, total 138 03/14/2018 12:23 PM   HDL Cholesterol 37 (L) 03/14/2018 12:23 PM   LDL, calculated 72 03/14/2018 12:23 PM   LDL-C, External 57 10/14/2015   Triglyceride 143 03/14/2018 12:23 PM   CHOL/HDL Ratio 2.8 06/02/2010 09:57 AM     Lab Results   Component Value Date/Time    Sodium 141 03/14/2018 12:23 PM    Potassium 5.1 03/14/2018 12:23 PM    Chloride 106 03/14/2018 12:23 PM    CO2 21 03/14/2018 12:23 PM    Anion gap 9 02/26/2010 04:04 PM    Glucose 116 (H) 03/14/2018 12:23 PM    BUN 18 03/14/2018 12:23 PM    Creatinine 1.16 (H) 03/14/2018 12:23 PM    BUN/Creatinine ratio 16 03/14/2018 12:23 PM    GFR est AA 50 (L) 03/14/2018 12:23 PM    GFR est non-AA 44 (L) 03/14/2018 12:23 PM    Calcium 9.8 03/14/2018 12:23 PM    Bilirubin, total 0.5 10/17/2017 03:41 PM    ALT (SGPT) 9 10/17/2017 03:41 PM    AST (SGOT) 13 10/17/2017 03:41 PM    Alk. phosphatase 51 10/17/2017 03:41 PM    Protein, total 9.4 (H) 10/17/2017 03:41 PM    Albumin 4.2 10/17/2017 03:41 PM    Globulin 5.6 (H) 06/02/2010 09:57 AM    A-G Ratio 0.8 (L) 10/17/2017 03:41 PM      Lab Results   Component Value Date/Time    Hemoglobin A1c 7.4 (H) 10/28/2016 10:49 AM    Hemoglobin A1c (POC) 6.2 03/14/2018 12:13 PM    Hemoglobin A1c, External 6.4 10/14/2015         Review of Systems   Constitutional: Positive for malaise/fatigue. HENT: Negative for congestion. Eyes: Negative for blurred vision. Respiratory: Negative for cough and shortness of breath. Cardiovascular: Negative for chest pain, palpitations and leg swelling. Gastrointestinal: Negative for abdominal pain, constipation and heartburn. Genitourinary: Negative for dysuria, frequency and urgency. Musculoskeletal: Negative for back pain and joint pain. Neurological: Positive for dizziness. Negative for tingling, sensory change, speech change, focal weakness and headaches. Endo/Heme/Allergies: Negative for environmental allergies. Psychiatric/Behavioral: Negative for depression. The patient does not have insomnia. Physical Exam   Constitutional: She appears well-developed and well-nourished. /86 (BP 1 Location: Right arm, BP Patient Position: Sitting)  Pulse 63  Temp 97.2 °F (36.2 °C) (Oral)   Resp 20  Ht 5' 2\" (1.575 m)  Wt 141 lb (64 kg)  SpO2 98%  BMI 25.79 kg/m2     HENT:   Right Ear: Tympanic membrane and ear canal normal.   Left Ear: Tympanic membrane and ear canal normal.   Nose: No mucosal edema or rhinorrhea. Mouth/Throat: Oropharynx is clear and moist and mucous membranes are normal.   Ears with slight wax impaction on the left. This was cleared with irrigation. Left TM was injection and dull. Neck: Normal range of motion. Neck supple. No thyromegaly present.    Cardiovascular: Normal rate and regular rhythm. No murmur heard. Pulmonary/Chest: Effort normal and breath sounds normal.   Abdominal: Soft. Bowel sounds are normal. There is no tenderness. Musculoskeletal: Normal range of motion. She exhibits no edema. Lymphadenopathy:     She has no cervical adenopathy. Neurological:   Neurological Exam: alert, oriented, normal speech, no focal findings or movement disorder noted, cranial nerves II through XII intact, Babinski sign negative, motor and sensory grossly normal bilaterally, normal muscle tone, no tremors, strength 5/5. Skin: Skin is warm and dry. Psychiatric: She has a normal mood and affect. Nursing note and vitals reviewed. ASSESSMENT and PLAN  Diagnoses and all orders for this visit:    1. Vertigo  -     MRI BRAIN W WO CONT; Future  -     meclizine (ANTIVERT) 12.5 mg tablet; Take 1 Tab by mouth three (3) times daily as needed. Take as needed for dizziness. 2. Other nonsuppurative otitis media of left ear, unspecified chronicity  -     azithromycin (ZITHROMAX) 250 mg tablet; Take 2 tablets today, then take 1 tablet daily    3. Essential hypertension, malignant  Discussed sodium restriction, high k rich diet, maintaining ideal body weight and regular exercise program such as daily walking 30 min perday 4-5 times per week, as physiologic means to achieve blood pressure control.  Medication compliance advised. 4. Type 2 diabetes mellitus without complication, without long-term current use of insulin (HCC)  -     AMB POC HEMOGLOBIN A1C  -     AMB POC GLUCOSE, QUANTITATIVE, BLOOD    5. Mixed hyperlipidemia  -     LIPID PANEL    6. ELEAZAR on CPAP  Stable     7. Chronic anemia  -     AMB POC COMPLETE CBC, AUTOMATED      8. Encounter for medication monitoring  -     METABOLIC PANEL, BASIC      Follow-up Disposition: 2 weeks.    reviewed diet, exercise and weight control  cardiovascular risk and specific lipid/LDL goals reviewed  reviewed medications and side effects in detail    I have discussed diagnosis listed in this note with pt and/or family. I have discussed treatment plans and options and the risk/benefit analysis of those options, including safe use of medications and possible medication side effects. Through the use of shared decision making we have agreed to the above plan. The patient has received an after-visit summary and questions were answered concerning future plans and follow up. Advise pt of any urgent changes then to proceed to the ER.

## 2018-03-14 NOTE — PROGRESS NOTES
Chief Complaint   Patient presents with    Hypertension     follow up     Diabetes     follow up    Ear Fullness     patient c/o left ear clogged      Dizziness     patient c/o dizziness when she woke up after dangling her feet     Mammogram 02/01/2017    Colonoscopy 05/02/2016 repeat in 2 years by  [facility] exam 06/09/2017 by Dr. Nikki Lopez    1. Have you been to the ER, urgent care clinic since your last visit? Yes [facility] 03/06/2018 C/O ear clogged and dizziness. Hospitalized since your last visit? NO  2. Have you seen or consulted any other health care providers outside of the [facility] since your last visit? Yes [facility] see note above    Patient stated \" I did not take any of my medication because I was feeling nauseous\". Patient denies any pain at this time.

## 2018-03-14 NOTE — PROGRESS NOTES
HISTORY OF PRESENT ILLNESS  Joanna Kirk is a 80 y.o. female. HPI   Follow up on chronic medical problems. Overall feeling well. Cardiovascular Review:  The patient has hypertension, ELEAZAR (but has not been using the CPAP) and hyperlipidemia. Diet and Lifestyle: generally follows a low fat low cholesterol diet, generally follows a low sodium diet, follows a diabetic diet regularly  Home BP Monitoring: is well controlled at home, ranging 130s's/70-80's. Pertinent ROS: taking medications as instructed, no medication side effects noted, no TIA's, no chest pain on exertion, no dyspnea on exertion, no swelling of ankles. Diabetes Mellitus:  She has diabetes mellitus. Diabetic ROS - diabetic diet compliance: compliant most of the time, home glucose monitoring: is performed regularly, fasting values range low 100s,  Pt does not have BS log with her today, further diabetic ROS: no polyuria or polydipsia, no chest pain, dyspnea or TIA's, no numbness, tingling or pain in extremities, no unusual visual symptoms, no hypoglycemia. Lab review: orders written for new lab studies as appropriate; see orders. Chronic anemia and multiple myeloma in remission is being followed by hematology. {Choose one or more [facility] Chronic Disease Notes, press DELETE if none desired:[id number]}  {Choose one or more SmartLinks; press DELETE if none desired:[id number]}   {Choose one or more Last Lab values; press DELETE if none desired:[id number]}     Review of Systems   Constitutional: Negative for malaise/fatigue. HENT: Negative for congestion. Eyes: Negative for blurred vision. Respiratory: Negative for cough and shortness of breath. Cardiovascular: Negative for chest pain, palpitations and leg swelling. Gastrointestinal: Negative for abdominal pain, constipation and heartburn. Genitourinary: Negative for dysuria, frequency and urgency. Musculoskeletal: Negative for back pain and joint pain.    Neurological: Negative for dizziness, tingling and headaches. Endo/Heme/Allergies: Negative for environmental allergies. Psychiatric/Behavioral: Negative for depression. The patient does not have insomnia. Physical Exam   Constitutional: She appears well-developed and well-nourished. HENT:   Right Ear: Tympanic membrane and ear canal normal.   Left Ear: Tympanic membrane and ear canal normal.   Nose: No mucosal edema or rhinorrhea. Mouth/Throat: Oropharynx is clear and moist and mucous membranes are normal.   Neck: Normal range of motion. Neck supple. No thyromegaly present. Cardiovascular: Normal rate and regular rhythm. No murmur heard. Pulmonary/Chest: Effort normal and breath sounds normal.   Abdominal: Soft. Bowel sounds are normal. There is no tenderness. Musculoskeletal: Normal range of motion. She exhibits no edema. Lymphadenopathy:     She has no cervical adenopathy. Skin: Skin is warm and dry. Psychiatric: She has a normal mood and affect. Nursing note and vitals reviewed.       ASSESSMENT and PLAN  {ASSESSMENT/PLAN:61090}

## 2018-03-15 VITALS
HEIGHT: 62 IN | OXYGEN SATURATION: 98 % | BODY MASS INDEX: 25.95 KG/M2 | WEIGHT: 141 LBS | TEMPERATURE: 97.2 F | SYSTOLIC BLOOD PRESSURE: 152 MMHG | HEART RATE: 63 BPM | RESPIRATION RATE: 20 BRPM | DIASTOLIC BLOOD PRESSURE: 86 MMHG

## 2018-03-15 LAB
BUN SERPL-MCNC: 18 MG/DL (ref 8–27)
BUN/CREAT SERPL: 16 (ref 12–28)
CALCIUM SERPL-MCNC: 9.8 MG/DL (ref 8.7–10.3)
CHLORIDE SERPL-SCNC: 106 MMOL/L (ref 96–106)
CHOLEST SERPL-MCNC: 138 MG/DL (ref 100–199)
CO2 SERPL-SCNC: 21 MMOL/L (ref 18–29)
CREAT SERPL-MCNC: 1.16 MG/DL (ref 0.57–1)
GFR SERPLBLD CREATININE-BSD FMLA CKD-EPI: 44 ML/MIN/1.73
GFR SERPLBLD CREATININE-BSD FMLA CKD-EPI: 50 ML/MIN/1.73
GLUCOSE SERPL-MCNC: 116 MG/DL (ref 65–99)
HDLC SERPL-MCNC: 37 MG/DL
INTERPRETATION, 910389: NORMAL
INTERPRETATION: NORMAL
LDLC SERPL CALC-MCNC: 72 MG/DL (ref 0–99)
PDF IMAGE, 910387: NORMAL
POTASSIUM SERPL-SCNC: 5.1 MMOL/L (ref 3.5–5.2)
SODIUM SERPL-SCNC: 141 MMOL/L (ref 134–144)
TRIGL SERPL-MCNC: 143 MG/DL (ref 0–149)
VLDLC SERPL CALC-MCNC: 29 MG/DL (ref 5–40)

## 2018-03-15 RX ORDER — AZITHROMYCIN 250 MG/1
TABLET, FILM COATED ORAL
Qty: 6 TAB | Refills: 0
Start: 2018-03-15 | End: 2018-11-02 | Stop reason: ALTCHOICE

## 2018-03-15 RX ORDER — MECLIZINE HCL 12.5 MG 12.5 MG/1
12.5 TABLET ORAL
Qty: 30 TAB | Refills: 0
Start: 2018-03-15

## 2018-03-27 ENCOUNTER — TELEPHONE (OUTPATIENT)
Dept: FAMILY MEDICINE CLINIC | Age: 83
End: 2018-03-27

## 2018-03-27 DIAGNOSIS — H92.09 OTALGIA, UNSPECIFIED LATERALITY: Primary | ICD-10-CM

## 2018-03-27 DIAGNOSIS — R42 DIZZINESS: ICD-10-CM

## 2018-03-27 NOTE — TELEPHONE ENCOUNTER
Patient called to leave a message for Jacqueline Blanchard in refrence to ENT doctor. Patient stated last visit Dr. Newsome Expose told her if her ears continue to hurt she will refer patient to ENT. Patient would like a referral to see a ENT doctor.

## 2018-03-28 NOTE — TELEPHONE ENCOUNTER
Called patient and informed that Dr. Carina Falk will be referring to a ENT and Madhu Licea in the referral office will call with date and time of appt.  Patient verbalized understanding

## 2018-10-15 RX ORDER — ALLOPURINOL 100 MG/1
TABLET ORAL
Qty: 90 TAB | Refills: 3 | Status: SHIPPED | OUTPATIENT
Start: 2018-10-15 | End: 2019-09-03 | Stop reason: SDUPTHER

## 2018-10-15 RX ORDER — AMLODIPINE BESYLATE 10 MG/1
TABLET ORAL
Qty: 90 TAB | Refills: 3 | Status: SHIPPED | OUTPATIENT
Start: 2018-10-15 | End: 2020-01-08

## 2018-10-15 RX ORDER — SPIRONOLACTONE 25 MG/1
TABLET ORAL
Qty: 180 TAB | Refills: 3 | Status: SHIPPED | OUTPATIENT
Start: 2018-10-15 | End: 2020-01-08

## 2018-10-15 RX ORDER — CLONIDINE HYDROCHLORIDE 0.3 MG/1
TABLET ORAL
Qty: 135 TAB | Refills: 3 | Status: SHIPPED | OUTPATIENT
Start: 2018-10-15 | End: 2020-01-08

## 2018-10-15 RX ORDER — MINOXIDIL 10 MG/1
TABLET ORAL
Qty: 45 TAB | Refills: 3 | Status: SHIPPED | OUTPATIENT
Start: 2018-10-15 | End: 2020-01-10 | Stop reason: SDUPTHER

## 2018-11-02 ENCOUNTER — OFFICE VISIT (OUTPATIENT)
Dept: FAMILY MEDICINE CLINIC | Age: 83
End: 2018-11-02

## 2018-11-02 ENCOUNTER — HOSPITAL ENCOUNTER (OUTPATIENT)
Dept: LAB | Age: 83
Discharge: HOME | End: 2018-11-02
Payer: MEDICARE

## 2018-11-02 VITALS
HEART RATE: 54 BPM | BODY MASS INDEX: 26.5 KG/M2 | DIASTOLIC BLOOD PRESSURE: 55 MMHG | TEMPERATURE: 96.8 F | RESPIRATION RATE: 18 BRPM | HEIGHT: 62 IN | SYSTOLIC BLOOD PRESSURE: 132 MMHG | OXYGEN SATURATION: 96 % | WEIGHT: 144 LBS

## 2018-11-02 DIAGNOSIS — E11.9 TYPE 2 DIABETES MELLITUS WITHOUT COMPLICATION, WITHOUT LONG-TERM CURRENT USE OF INSULIN (HCC): ICD-10-CM

## 2018-11-02 DIAGNOSIS — Z51.81 ENCOUNTER FOR MEDICATION MONITORING: ICD-10-CM

## 2018-11-02 DIAGNOSIS — D64.9 CHRONIC ANEMIA: ICD-10-CM

## 2018-11-02 DIAGNOSIS — G47.33 OSA (OBSTRUCTIVE SLEEP APNEA): ICD-10-CM

## 2018-11-02 DIAGNOSIS — R06.02 SOB (SHORTNESS OF BREATH): ICD-10-CM

## 2018-11-02 DIAGNOSIS — Z12.31 ENCOUNTER FOR SCREENING MAMMOGRAM FOR BREAST CANCER: ICD-10-CM

## 2018-11-02 DIAGNOSIS — I10 ESSENTIAL HYPERTENSION, MALIGNANT: Primary | ICD-10-CM

## 2018-11-02 DIAGNOSIS — M48.061 SPINAL STENOSIS OF LUMBAR REGION WITHOUT NEUROGENIC CLAUDICATION: ICD-10-CM

## 2018-11-02 DIAGNOSIS — M15.9 PRIMARY OSTEOARTHRITIS INVOLVING MULTIPLE JOINTS: ICD-10-CM

## 2018-11-02 DIAGNOSIS — E78.2 MIXED HYPERLIPIDEMIA: ICD-10-CM

## 2018-11-02 LAB
BILIRUB UR QL STRIP: NEGATIVE
GLUCOSE POC: 102 MG/DL
GLUCOSE UR-MCNC: NEGATIVE MG/DL
HBA1C MFR BLD HPLC: 6.3 %
KETONES P FAST UR STRIP-MCNC: NEGATIVE MG/DL
PH UR STRIP: 6 [PH] (ref 4.6–8)
PROT UR QL STRIP: NEGATIVE
SP GR UR STRIP: 1.01 (ref 1–1.03)
UA UROBILINOGEN AMB POC: NORMAL (ref 0.2–1)
URINALYSIS CLARITY POC: CLEAR
URINALYSIS COLOR POC: YELLOW
URINE BLOOD POC: NEGATIVE
URINE LEUKOCYTES POC: NEGATIVE
URINE NITRITES POC: NEGATIVE

## 2018-11-02 PROCEDURE — 36415 COLL VENOUS BLD VENIPUNCTURE: CPT

## 2018-11-02 PROCEDURE — 80053 COMPREHEN METABOLIC PANEL: CPT

## 2018-11-02 PROCEDURE — 82043 UR ALBUMIN QUANTITATIVE: CPT

## 2018-11-02 PROCEDURE — 85027 COMPLETE CBC AUTOMATED: CPT

## 2018-11-02 PROCEDURE — 80061 LIPID PANEL: CPT

## 2018-11-02 RX ORDER — GABAPENTIN 300 MG/1
300 CAPSULE ORAL
COMMUNITY
Start: 2017-11-27 | End: 2018-11-15

## 2018-11-02 RX ORDER — LORAZEPAM 1 MG/1
1 TABLET ORAL
COMMUNITY
Start: 2017-11-27 | End: 2019-08-13

## 2018-11-02 RX ORDER — CHLORTHALIDONE 25 MG/1
25 TABLET ORAL DAILY
COMMUNITY
End: 2018-11-02 | Stop reason: SDUPTHER

## 2018-11-02 RX ORDER — ASCORBATE CALCIUM 500 MG
500 TABLET ORAL DAILY
COMMUNITY
Start: 2010-06-02 | End: 2021-04-28 | Stop reason: ALTCHOICE

## 2018-11-02 RX ORDER — METFORMIN HYDROCHLORIDE 500 MG/1
500 TABLET, EXTENDED RELEASE ORAL
COMMUNITY
End: 2018-11-02 | Stop reason: SDUPTHER

## 2018-11-02 NOTE — PROGRESS NOTES
Name and  verified Chief Complaint Patient presents with  Hypertension f/u  Diabetes f/u Patient complaint right shoulder pain for 4-5 months she denies any injury. Health Maintenance reviewed-discussed with patient. 1. Have you been to the ER, urgent care clinic since your last visit? Hospitalized since your last visit? no 
 
2. Have you seen or consulted any other health care providers outside of the [address] [place] [address] since your last visit? Include any pap smears or colon screening. Yes, patient stated orthopedic office visit for sciatica nerve pain  two months ago unsure of name of provider and Dr. Zulay Torres office visit 2-3 weeks ago. Patient ;ast eye exam 2018 with [facility] medical release form completed and faxed. Order placed for EKG, per Verbal Order from Dr. Shamir Villalobos on 2018 due to SOB.

## 2018-11-02 NOTE — PROGRESS NOTES
HISTORY OF PRESENT ILLNESS Job Resendiz is a 80 y.o. female. HPI Follow up on chronic medical problems. Overall feeling well. Cardiovascular Review: 
The patient has hypertension, ELEAZAR (but has not been using the CPAP) and hyperlipidemia. She c/o feeling more SOB over the past couple of months. Denies chest pain or chest tightness. SOB more when she is more active. No cough or congestion noted. Diet and Lifestyle: generally follows a low fat low cholesterol diet, generally follows a low sodium diet, follows a diabetic diet regularly Home BP Monitoring: is well controlled at home, ranging 130s's/70-80's. Pertinent ROS: taking medications as instructed, no medication side effects noted, no TIA's, no chest pain on exertion, no dyspnea on exertion, no swelling of ankles. Diabetes Mellitus: 
She has diabetes mellitus. Diabetic ROS - diabetic diet compliance: compliant most of the time, home glucose monitoring: is performed regularly, fasting values range low 100s,  Pt does not have BS log with her today, further diabetic ROS: no polyuria or polydipsia, no chest pain, dyspnea or TIA's, no numbness, tingling or pain in extremities, no unusual visual symptoms, no hypoglycemia. Lab review: orders written for new lab studies as appropriate; see orders. Chronic anemia and multiple myeloma in remission is being followed by hematology. Osteoarthritis: 
Patient has osteoarthritis. She also had know spinal stenosis. She has been having pains in several joints that migrate and comes and goes. Symptoms onset: problem is longstanding. Rheumatological ROS: stable, mild-to-moderate joint symptoms intermittently, reasonably well controlled by PRN meds. Response to treatment plan: stable and intermittent. Patient Active Problem List  
Diagnosis Code  Constipation K59.00  Gout M10.9  Spinal stenosis M48.00  Chronic anemia D64.9  Multiple myeloma, without mention of having achieved remission C90.00  Benign neoplasm of adrenal gland D35.00  Essential hypertension, malignant I10  
 Mixed hyperlipidemia E78.2  
 ELEAZAR on CPAP G47.33, Z99.89  
 Encounter for medication monitoring Z51.81  Type 2 diabetes mellitus without complication (HCC) J19.2 Current Outpatient Medications Medication Sig Dispense Refill  ascorbate calcium 500 mg tab Take 500 mg by mouth daily.  amLODIPine (NORVASC) 10 mg tablet TAKE 1 TABLET BY MOUTH ONCE A DAY 90 Tab 3  
 spironolactone (ALDACTONE) 25 mg tablet TAKE 1 TABLET BY MOUTH TWO  TIMES DAILY 180 Tab 3  cloNIDine HCl (CATAPRES) 0.3 mg tablet TAKE ONE HALF TABLET DAILY  AFTER LUNCH AND TAKE 1  TABLET EVERY NIGHT AT  BEDTIME 135 Tab 3  
 allopurinol (ZYLOPRIM) 100 mg tablet TAKE 1 TABLET BY MOUTH  EVERY DAY 90 Tab 3  
 minoxidil (LONITEN) 10 mg tablet TAKE ONE-HALF TABLET BY  MOUTH DAILY 45 Tab 3  chlorthalidone (HYGROTEN) 25 mg tablet TAKE 1 TABLET BY MOUTH TWO  TIMES DAILY 180 Tab 3  
 meclizine (ANTIVERT) 12.5 mg tablet Take 1 Tab by mouth three (3) times daily as needed. Take as needed for dizziness. 30 Tab 0  
 ondansetron (ZOFRAN ODT) 4 mg disintegrating tablet Take 4 mg by mouth every eight (8) hours as needed for Nausea.  metoprolol tartrate (LOPRESSOR) 100 mg IR tablet TAKE ONE TABLET BY MOUTH TWICE A  Tab 3  
 metFORMIN ER (GLUCOPHAGE XR) 500 mg tablet Take 1 Tab by mouth daily (with dinner). 90 Tab 3  
 rosuvastatin (CRESTOR) 5 mg tablet TAKE ONE (1) TABLET(S) BY MOUTH N IGHTLY. 90 Tab 3  
 traMADol (ULTRAM) 50 mg tablet Take 1 Tab by mouth every six (6) hours as needed for Pain. Max Daily Amount: 200 mg. 30 Tab 1  polyethylene glycol (MIRALAX) 17 gram packet Take 17 g by mouth daily as needed.  cyanocobalamin (VITAMIN B-12) 100 mcg tablet Take 100 mcg by mouth daily.     
 EPOETIN BRYAN (PROCRIT IJ) 1,000 Units by Injection route every fourteen (14) days. Depending on blood count  omega-3 fatty acids-vitamin e (FISH OIL) 1,000 mg Cap Take 1 Tab by mouth two (2) times a day.  cholecalciferol, vitamin d3, (VITAMIN D) 1,000 unit tablet Take 1,000 Units by mouth daily.  gabapentin (NEURONTIN) 300 mg capsule Take 300 mg by mouth. Patient unsure of frequency  LORazepam (ATIVAN) 1 mg tablet Take 1 mg by mouth.  fluticasone (FLONASE) 50 mcg/actuation nasal spray 2 Sprays by Both Nostrils route. Daily as needed Allergies Allergen Reactions  Pcn [Penicillins] Swelling  
  tongue  Bactrim [Sulfamethoxazole-Trimethoprim] Diarrhea  Ceftin [Cefuroxime Axetil] Diarrhea  Hydralazine Other (comments) edema  Sulfa (Sulfonamide Antibiotics) Itching Past Medical History:  
Diagnosis Date  Anemia NEC  Benign neoplasm of adrenal gland  Chronic anemia 6/1/2010  Constipation   
 on a daily stool softener which pt states helps as of 4/27/16  Diabetes ([address]  WALKER (dyspnea on exertion)   
 as of 4/27/16 pt states this is her baseline and is not getting any worse  Goiter 2011  
 as of 4/27/16 Pt states \"I am not even sure I have it\". Pt denies any problems  Gout 6/1/2010  
 as of 4/27/16 pt denies any sx  
 HTN (hypertension) 6/1/2010  
 followed by cardiology Dr Sho Doe  [phone]  Multiple myeloma, without mention of having achieved remission Dx 11/1/99  
 followed by Paul Coad  Pure hypercholesterolemia 6/1/2010  Sleep apnea 6/1/2010 CPAP  Spinal stenosis 6/1/2010 Past Surgical History:  
Procedure Laterality Date  HX HYSTERECTOMY  1970s  HX ORTHOPAEDIC  2005  
 left hand-trigger finger  HX ORTHOPAEDIC  2007  
 right hand- trigger finger  SC COLONOSCOPY FLX DX W/COLLJ SPEC WHEN PFRMD  08/10/2010 [place] Family History Problem Relation Age of Onset  Hypertension Mother  No Known Problems Father  Cancer Sister 52  
     colon  Diabetes Sister  Kidney Disease Sister  Hypertension Sister  Elevated Lipids Sister Social History Tobacco Use  Smoking status: Former Smoker Last attempt to quit: 1980 Years since quittin.8  Smokeless tobacco: Never Used Substance Use Topics  Alcohol use: No  
  Alcohol/week: 0.0 oz Lab Results Component Value Date/Time WBC 5.6 10/17/2017 03:41 PM  
 HGB 9.9 (L) 10/17/2017 03:41 PM  
 HCT 31.0 (L) 10/17/2017 03:41 PM  
 PLATELET 470  03:41 PM  
 MCV 91 10/17/2017 03:41 PM  
 
Lab Results Component Value Date/Time Cholesterol, total 138 2018 12:23 PM  
 HDL Cholesterol 37 (L) 2018 12:23 PM  
 LDL, calculated 72 2018 12:23 PM  
 LDL-C, External 57 10/14/2015 Triglyceride 143 2018 12:23 PM  
 CHOL/HDL Ratio 2.8 2010 09:57 AM  
 
Lab Results Component Value Date/Time TSH 2.860 2011 09:29 AM  
  
Lab Results Component Value Date/Time Sodium 141 2018 12:23 PM  
 Potassium 5.1 2018 12:23 PM  
 Chloride 106 2018 12:23 PM  
 CO2 21 2018 12:23 PM  
 Anion gap 9 2010 04:04 PM  
 Glucose 116 (H) 2018 12:23 PM  
 BUN 18 2018 12:23 PM  
 Creatinine 1.16 (H) 2018 12:23 PM  
 BUN/Creatinine ratio 16 2018 12:23 PM  
 GFR est AA 50 (L) 2018 12:23 PM  
 GFR est non-AA 44 (L) 2018 12:23 PM  
 Calcium 9.8 2018 12:23 PM  
 Bilirubin, total 0.5 10/17/2017 03:41 PM  
 ALT (SGPT) 9 10/17/2017 03:41 PM  
 AST (SGOT) 13 10/17/2017 03:41 PM  
 Alk. phosphatase 51 10/17/2017 03:41 PM  
 Protein, total 9.4 (H) 10/17/2017 03:41 PM  
 Albumin 4.2 10/17/2017 03:41 PM  
 Globulin 5.6 (H) 2010 09:57 AM  
 A-G Ratio 0.8 (L) 10/17/2017 03:41 PM  
  
Lab Results Component Value Date/Time Hemoglobin A1c 7.4 (H) 10/28/2016 10:49 AM  
 Hemoglobin A1c (POC) 6.3 2018 10:04 AM  
 Hemoglobin A1c, External 6.4 10/14/2015 Review of Systems Constitutional: Negative for malaise/fatigue. HENT: Negative for congestion. Eyes: Negative for blurred vision. Respiratory: Negative for cough and shortness of breath. Cardiovascular: Negative for chest pain, palpitations and leg swelling. Gastrointestinal: Negative for abdominal pain, constipation and heartburn. Genitourinary: Negative for dysuria, frequency and urgency. Musculoskeletal: Negative for back pain and joint pain. Neurological: Negative for dizziness, tingling and headaches. Endo/Heme/Allergies: Negative for environmental allergies. Psychiatric/Behavioral: Negative for depression. The patient does not have insomnia. Physical Exam  
Constitutional: She appears well-developed and well-nourished. /55 (BP 1 Location: Left arm, BP Patient Position: At rest)   Pulse (!) 54   Temp 96.8 °F (36 °C) (Oral)   Resp 18   Ht 5' 2\" (1.575 m)   Wt 144 lb (65.3 kg)   SpO2 96%   BMI 26.34 kg/m² HENT:  
Right Ear: Tympanic membrane and ear canal normal.  
Left Ear: Tympanic membrane and ear canal normal.  
Nose: No mucosal edema or rhinorrhea. Mouth/Throat: Oropharynx is clear and moist and mucous membranes are normal.  
Neck: Normal range of motion. Neck supple. No thyromegaly present. Cardiovascular: Normal rate and regular rhythm. No murmur heard. Pulmonary/Chest: Effort normal and breath sounds normal.  
Abdominal: Soft. Bowel sounds are normal. There is no tenderness. Musculoskeletal: Normal range of motion. She exhibits no edema. Lymphadenopathy:  
  She has no cervical adenopathy. Skin: Skin is warm and dry. Psychiatric: She has a normal mood and affect. Nursing note and vitals reviewed. ASSESSMENT and PLAN Diagnoses and all orders for this visit: 1. Essential hypertension, malignant Stable Discussed sodium restriction, high k rich diet, maintaining ideal body weight and regular exercise program such as daily walking 30 min perday 4-5 times per week, as physiologic means to achieve blood pressure control.  Medication compliance advised. 2. Type 2 diabetes mellitus without complication, without long-term current use of insulin ([facility] [address] -     AMB POC HEMOGLOBIN A1C 
-     AMB POC GLUCOSE, QUANTITATIVE, BLOOD 
-     MICROALBUMIN, UR, RAND W/ MICROALB/CREAT RATIO 
-     AMB POC URINALYSIS DIP STICK AUTO W/ MICRO 3. Mixed hyperlipidemia -     LIPID PANEL 4. Chronic anemia 
-     CBC W/O DIFF 5. Spinal stenosis of lumbar region without neurogenic claudication Primary Osteoarthritis Multiple Joints Stable 6. ELEAZAR She is not interested in getting refit for CPAP 7. Encounter for medication monitoring -     METABOLIC PANEL, COMPREHENSIVE 8. SOB (shortness of breath) -     AMB POC EKG ROUTINE W/ 12 LEADS, INTER & REP 
-     REFERRAL TO CARDIOLOGY Follow-up Disposition: Not on File 
reviewed diet, exercise and weight control 
cardiovascular risk and specific lipid/LDL goals reviewed 
reviewed medications and side effects in detail 
specific diabetic recommendations: low cholesterol diet, weight control and daily exercise discussed, home glucose monitoring emphasized, foot care discussed and Podiatry visits discussed, annual eye examinations at Ophthalmology discussed and glycohemoglobin and other lab monitoring discussed I have discussed diagnosis listed in this note with pt and/or family. I have discussed treatment plans and options and the risk/benefit analysis of those options, including safe use of medications and possible medication side effects. Through the use of shared decision making we have agreed to the above plan. The patient has received an after-visit summary and questions were answered concerning future plans and follow up. Advise pt of any urgent changes then to proceed to the ER.

## 2018-11-02 NOTE — LETTER
11/8/2018 2:41 PM 
 
Ms. Kamron Burris [address] [facility] [address] [id number] Dear Kamron Burris: 
 
Please find your most recent results below. Resulted Orders METABOLIC PANEL, COMPREHENSIVE Result Value Ref Range Glucose 90 65 - 99 mg/dL BUN 25 8 - 27 mg/dL Creatinine 1.16 (H) 0.57 - 1.00 mg/dL GFR est non-AA 44 (L) >59 mL/min/1.73 GFR est AA 50 (L) >59 mL/min/1.73  
 BUN/Creatinine ratio 22 12 - 28 Sodium 139 134 - 144 mmol/L Potassium 4.2 3.5 - 5.2 mmol/L Chloride 104 96 - 106 mmol/L  
 CO2 23 20 - 29 mmol/L Calcium 9.9 8.7 - 10.3 mg/dL Protein, total 8.7 (H) 6.0 - 8.5 g/dL Albumin 4.3 3.5 - 4.7 g/dL GLOBULIN, TOTAL 4.4 1.5 - 4.5 g/dL A-G Ratio 1.0 (L) 1.2 - 2.2 Bilirubin, total 0.7 0.0 - 1.2 mg/dL Alk. phosphatase 43 39 - 117 IU/L  
 AST (SGOT) 11 0 - 40 IU/L  
 ALT (SGPT) 10 0 - 32 IU/L Narrative Specimen Comment: A duplicate report has been generated due to demographic  
updates. Performed at:  [address]  [id number] : Brisa Germain MD, Phone:  [phone] LIPID PANEL Result Value Ref Range Cholesterol, total 136 100 - 199 mg/dL Triglyceride 104 0 - 149 mg/dL HDL Cholesterol 41 >39 mg/dL VLDL, calculated 21 5 - 40 mg/dL LDL, calculated 74 0 - 99 mg/dL Narrative Specimen Comment: A duplicate report has been generated due to demographic  
updates. Performed at:  [employer]  [id number] : Brisa Germain MD, Phone:  [phone] AMB POC HEMOGLOBIN A1C Result Value Ref Range Hemoglobin A1c (POC) 6.3 % Narrative Hemoglobin A1c Increased risk for diabetes: 5.7-6.4% Diabetes >6.4% Glycemic control for diabetics: <7.0% [facility] [address] [facility], [address] [place] [address] Street AMB POC GLUCOSE, QUANTITATIVE, BLOOD Result Value Ref Range Glucose  mg/dL Narrative Reference Range  WB Glucose   mg/dl [facility] [address] CBC W/O DIFF Result Value Ref Range WBC 4.7 3.4 - 10.8 x10E3/uL  
 RBC 3.60 (L) 3.77 - 5.28 x10E6/uL HGB 10.6 (L) 11.1 - 15.9 g/dL HCT 33.3 (L) 34.0 - 46.6 % MCV 93 79 - 97 fL  
 MCH 29.4 26.6 - 33.0 pg  
 MCHC 31.8 31.5 - 35.7 g/dL  
 RDW 18.0 (H) 12.3 - 15.4 % PLATELET 784 252 - 700 x10E3/uL Narrative Specimen Comment: A duplicate report has been generated due to demographic  
updates. Performed at:  [address]  [id number] : Ashia Arambula MD, Phone:  [phone] MICROALBUMIN, UR, RAND W/ MICROALB/CREAT RATIO Result Value Ref Range Creatinine, urine 72.0 Not Estab. mg/dL Microalbumin, urine 47.9 Not Estab. ug/mL Microalb/Creat ratio (ug/mg creat.) 66.5 (H) 0.0 - 30.0 mg/g creat Comment:  
                        Normal:                0.0 -  30.0 Albuminuria:          31.0 - 300.0 Clinical albuminuria:       >300.0 Narrative Performed at:  [address]  [id number] : Ashia Arambula MD, Phone:  [phone] AMB POC URINALYSIS DIP STICK AUTO W/ MICRO Result Value Ref Range Color (UA POC) Yellow Clarity (UA POC) Clear Glucose (UA POC) Negative Negative Bilirubin (UA POC) Negative Negative Ketones (UA POC) Negative Negative Specific gravity (UA POC) 1.010 1.001 - 1.035 Blood (UA POC) Negative Negative pH (UA POC) 6.0 4.6 - 8.0 Protein (UA POC) Negative Negative Urobilinogen (UA POC) 0.2 mg/dL 0.2 - 1 Nitrites (UA POC) Negative Negative Leukocyte esterase (UA POC) Negative Negative RECOMMENDATIONS: 
Lab results are stable. Please call me if you have any questions: [phone] Sincerely, Bimal Ballesteros MD

## 2018-11-03 LAB
ALBUMIN/CREAT UR: 66.5 MG/G CREAT (ref 0–30)
CREAT UR-MCNC: 72 MG/DL
INTERPRETATION: NORMAL
Lab: NORMAL
MICROALBUMIN UR-MCNC: 47.9 UG/ML

## 2018-11-05 LAB
ALBUMIN SERPL-MCNC: 4.3 G/DL (ref 3.5–4.7)
ALBUMIN/GLOB SERPL: 1 {RATIO} (ref 1.2–2.2)
ALP SERPL-CCNC: 43 IU/L (ref 39–117)
ALT SERPL-CCNC: 10 IU/L (ref 0–32)
AST SERPL-CCNC: 11 IU/L (ref 0–40)
BILIRUB SERPL-MCNC: 0.7 MG/DL (ref 0–1.2)
BUN SERPL-MCNC: 25 MG/DL (ref 8–27)
BUN/CREAT SERPL: 22 (ref 12–28)
CALCIUM SERPL-MCNC: 9.9 MG/DL (ref 8.7–10.3)
CHLORIDE SERPL-SCNC: 104 MMOL/L (ref 96–106)
CHOLEST SERPL-MCNC: 136 MG/DL (ref 100–199)
CO2 SERPL-SCNC: 23 MMOL/L (ref 20–29)
CREAT SERPL-MCNC: 1.16 MG/DL (ref 0.57–1)
ERYTHROCYTE [DISTWIDTH] IN BLOOD BY AUTOMATED COUNT: 18 % (ref 12.3–15.4)
GLOBULIN SER CALC-MCNC: 4.4 G/DL (ref 1.5–4.5)
GLUCOSE SERPL-MCNC: 90 MG/DL (ref 65–99)
HCT VFR BLD AUTO: 33.3 % (ref 34–46.6)
HDLC SERPL-MCNC: 41 MG/DL
HGB BLD-MCNC: 10.6 G/DL (ref 11.1–15.9)
INTERPRETATION, 910389: NORMAL
LDLC SERPL CALC-MCNC: 74 MG/DL (ref 0–99)
MCH RBC QN AUTO: 29.4 PG (ref 26.6–33)
MCHC RBC AUTO-ENTMCNC: 31.8 G/DL (ref 31.5–35.7)
MCV RBC AUTO: 93 FL (ref 79–97)
PDF IMAGE, 910387: NORMAL
PLATELET # BLD AUTO: 241 X10E3/UL (ref 150–379)
POTASSIUM SERPL-SCNC: 4.2 MMOL/L (ref 3.5–5.2)
PROT SERPL-MCNC: 8.7 G/DL (ref 6–8.5)
RBC # BLD AUTO: 3.6 X10E6/UL (ref 3.77–5.28)
SODIUM SERPL-SCNC: 139 MMOL/L (ref 134–144)
TRIGL SERPL-MCNC: 104 MG/DL (ref 0–149)
VLDLC SERPL CALC-MCNC: 21 MG/DL (ref 5–40)
WBC # BLD AUTO: 4.7 X10E3/UL (ref 3.4–10.8)

## 2018-11-15 ENCOUNTER — OFFICE VISIT (OUTPATIENT)
Dept: CARDIOLOGY CLINIC | Age: 83
End: 2018-11-15

## 2018-11-15 VITALS
BODY MASS INDEX: 26.13 KG/M2 | WEIGHT: 142 LBS | DIASTOLIC BLOOD PRESSURE: 60 MMHG | OXYGEN SATURATION: 98 % | HEIGHT: 62 IN | SYSTOLIC BLOOD PRESSURE: 152 MMHG | HEART RATE: 75 BPM

## 2018-11-15 DIAGNOSIS — G47.33 OSA ON CPAP: ICD-10-CM

## 2018-11-15 DIAGNOSIS — R06.09 DOE (DYSPNEA ON EXERTION): Primary | ICD-10-CM

## 2018-11-15 DIAGNOSIS — Z99.89 OSA ON CPAP: ICD-10-CM

## 2018-11-15 DIAGNOSIS — E11.21 TYPE 2 DIABETES WITH NEPHROPATHY (HCC): ICD-10-CM

## 2018-11-15 DIAGNOSIS — I10 ESSENTIAL HYPERTENSION, MALIGNANT: ICD-10-CM

## 2018-11-15 DIAGNOSIS — E78.2 MIXED HYPERLIPIDEMIA: ICD-10-CM

## 2018-11-15 NOTE — PROGRESS NOTES
[address]  [phone] Subjective: Abundio Moctezuma is a 80 y.o. female is here for routine f/u. States getting more shortwinded than unusual, progressing over several months. The patient denies chest pain, orthopnea, PND, LE edema, palpitations, syncope, or presyncope. Patient Active Problem List  
 Diagnosis Date Noted  Type 2 diabetes with nephropathy ([address] 11/15/2018  WALKER (dyspnea on exertion) 11/15/2018  ELEAZAR on CPAP 10/28/2015  Encounter for medication monitoring 10/28/2015  Type 2 diabetes mellitus without complication ([address] [id number]  Mixed hyperlipidemia 07/31/2013  Essential hypertension, malignant 07/11/2012  Multiple myeloma, without mention of having achieved remission  Benign neoplasm of adrenal gland  Constipation 06/01/2010  Gout 06/01/2010  Spinal stenosis 06/01/2010  Chronic anemia 06/01/2010 Alexa Tierney MD 
Past Medical History:  
Diagnosis Date  Anemia NEC  Benign neoplasm of adrenal gland  Chronic anemia 6/1/2010  Constipation   
 on a daily stool softener which pt states helps as of 4/27/16  Diabetes ([address]  WALKER (dyspnea on exertion)   
 as of 4/27/16 pt states this is her baseline and is not getting any worse  Goiter 2011  
 as of 4/27/16 Pt states \"I am not even sure I have it\". Pt denies any problems  Gout 6/1/2010  
 as of 4/27/16 pt denies any sx  
 HTN (hypertension) 6/1/2010  
 followed by cardiology Dr Aleyda Montgomery  [phone]  Multiple myeloma, without mention of having achieved remission Dx 11/1/99  
 followed by Karrie Claros  Pure hypercholesterolemia 6/1/2010  Sleep apnea 6/1/2010 CPAP  Spinal stenosis 6/1/2010 Past Surgical History:  
Procedure Laterality Date  HX HYSTERECTOMY  1970s  HX ORTHOPAEDIC  2005  
 left hand-trigger finger  HX ORTHOPAEDIC  2007  
 right hand- trigger finger  IL COLONOSCOPY FLX DX W/COLLJ SPEC WHEN PFRMD  08/10/2010 Bryanna Howard Allergies Allergen Reactions  Pcn [Penicillins] Swelling  
  tongue  Bactrim [Sulfamethoxazole-Trimethoprim] Diarrhea  Ceftin [Cefuroxime Axetil] Diarrhea  Hydralazine Other (comments) edema  Sulfa (Sulfonamide Antibiotics) Itching Family History Problem Relation Age of Onset  Hypertension Mother  No Known Problems Father  Cancer Sister 52  
     colon  Diabetes Sister  Kidney Disease Sister  Hypertension Sister  Elevated Lipids Sister Social History Socioeconomic History  Marital status:  Spouse name: Not on file  Number of children: Not on file  Years of education: Not on file  Highest education level: Not on file Social Needs  Financial resource strain: Not on file  Food insecurity - worry: Not on file  Food insecurity - inability: Not on file  Transportation needs - medical: Not on file  Transportation needs - non-medical: Not on file Occupational History  Not on file Tobacco Use  Smoking status: Former Smoker Last attempt to quit: 1980 Years since quittin.8  Smokeless tobacco: Never Used Substance and Sexual Activity  Alcohol use: No  
  Alcohol/week: 0.0 oz  Drug use: No  
 Sexual activity: Not Currently Other Topics Concern  Not on file Social History Narrative Lives with  in one story home Current Outpatient Medications Medication Sig  
 ascorbate calcium 500 mg tab Take 500 mg by mouth daily.  LORazepam (ATIVAN) 1 mg tablet Take 1 mg by mouth as needed.  amLODIPine (NORVASC) 10 mg tablet TAKE 1 TABLET BY MOUTH ONCE A DAY  spironolactone (ALDACTONE) 25 mg tablet TAKE 1 TABLET BY MOUTH TWO  TIMES DAILY  cloNIDine HCl (CATAPRES) 0.3 mg tablet TAKE ONE HALF TABLET DAILY  AFTER LUNCH AND TAKE 1  TABLET EVERY NIGHT AT  BEDTIME  
  allopurinol (ZYLOPRIM) 100 mg tablet TAKE 1 TABLET BY MOUTH  EVERY DAY  minoxidil (LONITEN) 10 mg tablet TAKE ONE-HALF TABLET BY  MOUTH DAILY  chlorthalidone (HYGROTEN) 25 mg tablet TAKE 1 TABLET BY MOUTH TWO  TIMES DAILY  meclizine (ANTIVERT) 12.5 mg tablet Take 1 Tab by mouth three (3) times daily as needed. Take as needed for dizziness.  ondansetron (ZOFRAN ODT) 4 mg disintegrating tablet Take 4 mg by mouth every eight (8) hours as needed for Nausea.  metoprolol tartrate (LOPRESSOR) 100 mg IR tablet TAKE ONE TABLET BY MOUTH TWICE A DAY  metFORMIN ER (GLUCOPHAGE XR) 500 mg tablet Take 1 Tab by mouth daily (with dinner).  fluticasone (FLONASE) 50 mcg/actuation nasal spray 2 Sprays by Both Nostrils route. Daily as needed  rosuvastatin (CRESTOR) 5 mg tablet TAKE ONE (1) TABLET(S) BY MOUTH N IGHTLY.  traMADol (ULTRAM) 50 mg tablet Take 1 Tab by mouth every six (6) hours as needed for Pain. Max Daily Amount: 200 mg.  polyethylene glycol (MIRALAX) 17 gram packet Take 17 g by mouth daily as needed.  cyanocobalamin (VITAMIN B-12) 100 mcg tablet Take 100 mcg by mouth daily.  EPOETIN BRYAN (PROCRIT IJ) 1,000 Units by Injection route every fourteen (14) days. Depending on blood count  omega-3 fatty acids-vitamin e (FISH OIL) 1,000 mg Cap Take 1 Tab by mouth two (2) times a day.  cholecalciferol, vitamin d3, (VITAMIN D) 1,000 unit tablet Take 1,000 Units by mouth daily. No current facility-administered medications for this visit. Review of Symptoms: 
11 systems reviewed, negative other than as stated in the HPI Physical ExamPhysical Exam:   
Vitals:  
 11/15/18 0952 11/15/18 1004 BP: 154/64 152/60 Pulse: 75 SpO2: 98% Weight: 142 lb (64.4 kg) Height: 5' 2\" (1.575 m) Body mass index is 25.97 kg/m². General PE Gen:  NAD Mental Status - Alert. General Appearance - Not in acute distress.   
Chest and Lung Exam  
 Inspection: Accessory muscles - No use of accessory muscles in breathing. Auscultation:  
Breath sounds: - Normal.  
Cardiovascular Inspection: Jugular vein - Bilateral - Inspection Normal.  
Palpation/Percussion:  
Apical Impulse: - Normal.  
Auscultation: Rhythm - Regular. Heart Sounds - S1 WNL and S2 WNL. No S3 or S4. Murmurs & Other Heart Sounds: Auscultation of the heart reveals - 2/6 DARRYL Peripheral Vascular Upper Extremity: Inspection - Bilateral - No Cyanotic nailbeds or Digital clubbing. Lower Extremity:  
Palpation: Edema - Bilateral - No edema. Abdomen:   Soft, non-tender, bowel sounds are active. Neuro: A&O times 3, CN and motor grossly WNL Labs:  
Lab Results Component Value Date/Time Cholesterol, total 136 11/02/2018 10:04 AM  
 Cholesterol, total 138 03/14/2018 12:23 PM  
 Cholesterol, total 112 10/17/2017 03:41 PM  
 Cholesterol, total 130 03/03/2017 10:58 AM  
 Cholesterol, total 128 06/27/2016 10:36 AM  
 HDL Cholesterol 41 11/02/2018 10:04 AM  
 HDL Cholesterol 37 (L) 03/14/2018 12:23 PM  
 HDL Cholesterol 36 (L) 10/17/2017 03:41 PM  
 HDL Cholesterol 36 (L) 03/03/2017 10:58 AM  
 HDL Cholesterol 39 (L) 06/27/2016 10:36 AM  
 LDL, calculated 74 11/02/2018 10:04 AM  
 LDL, calculated 72 03/14/2018 12:23 PM  
 LDL, calculated 51 10/17/2017 03:41 PM  
 LDL, calculated 67 03/03/2017 10:58 AM  
 LDL, calculated 65 06/27/2016 10:36 AM  
 Triglyceride 104 11/02/2018 10:04 AM  
 Triglyceride 143 03/14/2018 12:23 PM  
 Triglyceride 127 10/17/2017 03:41 PM  
 Triglyceride 134 03/03/2017 10:58 AM  
 Triglyceride 119 06/27/2016 10:36 AM  
 CHOL/HDL Ratio 2.8 06/02/2010 09:57 AM  
 CHOL/HDL Ratio 2.5 02/26/2010 04:04 PM  
 CHOL/HDL Ratio 2.6 11/24/2009 01:15 PM  
 CHOL/HDL Ratio 5.1 (H) 09/21/2009 02:44 PM  
 CHOL/HDL Ratio 3.0 07/13/2009 05:28 PM  
 
Lab Results Component Value Date/Time  11/24/2009 01:15 PM  
 
Lab Results Component Value Date/Time Sodium 139 11/02/2018 10:04 AM  
 Potassium 4.2 11/02/2018 10:04 AM  
 Chloride 104 11/02/2018 10:04 AM  
 CO2 23 11/02/2018 10:04 AM  
 Anion gap 9 02/26/2010 04:04 PM  
 Glucose 90 11/02/2018 10:04 AM  
 BUN 25 11/02/2018 10:04 AM  
 Creatinine 1.16 (H) 11/02/2018 10:04 AM  
 BUN/Creatinine ratio 22 11/02/2018 10:04 AM  
 GFR est AA 50 (L) 11/02/2018 10:04 AM  
 GFR est non-AA 44 (L) 11/02/2018 10:04 AM  
 Calcium 9.9 11/02/2018 10:04 AM  
 Bilirubin, total 0.7 11/02/2018 10:04 AM  
 AST (SGOT) 11 11/02/2018 10:04 AM  
 Alk. phosphatase 43 11/02/2018 10:04 AM  
 Protein, total 8.7 (H) 11/02/2018 10:04 AM  
 Albumin 4.3 11/02/2018 10:04 AM  
 Globulin 5.6 (H) 06/02/2010 09:57 AM  
 A-G Ratio 1.0 (L) 11/02/2018 10:04 AM  
 ALT (SGPT) 10 11/02/2018 10:04 AM  
 
 
EKG: 
NSR Assessment: 
 
 Assessment:  
  
1. WALKER (dyspnea on exertion) 2. Essential hypertension, malignant 3. ELEAZAR on CPAP 4. Type 2 diabetes with nephropathy (HCC) 5. Mixed hyperlipidemia Orders Placed This Encounter  AMB POC EKG ROUTINE W/ 12 LEADS, INTER & REP Order Specific Question:   Reason for Exam: Answer:   ROUTINE  LEXISCAN/CARDIOLITE, Clinic Performed Standing Status:   Future Standing Expiration Date:   5/15/2019 Order Specific Question:   Reason for Exam: Answer:   walker  2D ECHO COMPLETE ADULT (TTE) W OR WO CONTR Standing Status:   Future Standing Expiration Date:   11/15/2019 Order Specific Question:   Reason for Exam: Answer:   walker Order Specific Question:   Contrast Enhancement (Bubble Study, Definity, Optison) may be used if criteria listed in established evidence-based protocol has been identified. Answer:   Yes Plan: Pt presents for long f/u, last seen by us in 2012. [place] [facility] [place] denervation Simplicity HTN trial.  Now also following with DR Theodora WALKER, progressive Negative nuclear stress test in 3/09. Normal EF with no significant valvular pathology per echo in 3/09. 
2/6 DARRYL on exam 
Will repeat Idania and echo HTN Controlled with current therapy. She states home blood pressures go up to about 140. Followed by PCP. DM On oral agent HLD States she's taking Rosuvastatin but unsure of dose. She will check and call us back. ELEAZAR No longer on CPAP Continue current care and f/u in 1 year unless testing abnormal. 
 
Jayjay Cunningham MD

## 2018-11-15 NOTE — PROGRESS NOTES
Chief Complaint Patient presents with  Shortness of Breath 1. Have you been to the ER, urgent care clinic since your last visit? Hospitalized since your last visit? NO 
 
2. Have you seen or consulted any other health care providers outside of the [address] [place] [address] since your last visit? Include any pap smears or colon screening.   NO

## 2018-11-20 RX ORDER — METOPROLOL TARTRATE 100 MG/1
TABLET ORAL
Qty: 180 TAB | Refills: 3 | Status: SHIPPED | OUTPATIENT
Start: 2018-11-20 | End: 2020-01-08

## 2018-11-20 RX ORDER — ROSUVASTATIN CALCIUM 5 MG/1
TABLET, COATED ORAL
Qty: 90 TAB | Refills: 3 | Status: SHIPPED | OUTPATIENT
Start: 2018-11-20 | End: 2020-03-17 | Stop reason: SDUPTHER

## 2018-11-28 ENCOUNTER — CLINICAL SUPPORT (OUTPATIENT)
Dept: CARDIOLOGY CLINIC | Age: 83
End: 2018-11-28

## 2018-11-28 DIAGNOSIS — I10 ESSENTIAL HYPERTENSION, MALIGNANT: ICD-10-CM

## 2018-11-28 DIAGNOSIS — E11.21 TYPE 2 DIABETES WITH NEPHROPATHY (HCC): ICD-10-CM

## 2018-11-28 DIAGNOSIS — Z99.89 OSA ON CPAP: ICD-10-CM

## 2018-11-28 DIAGNOSIS — G47.33 OSA ON CPAP: ICD-10-CM

## 2018-11-28 DIAGNOSIS — R06.09 DOE (DYSPNEA ON EXERTION): ICD-10-CM

## 2018-12-04 ENCOUNTER — TELEPHONE (OUTPATIENT)
Dept: CARDIOLOGY CLINIC | Age: 83
End: 2018-12-04

## 2018-12-04 NOTE — TELEPHONE ENCOUNTER
--Notes recorded by Raf Hobbs NP on 11/29/2018 at 10:17 AM EST  Normal stress test.--- Message from Yoli Blancas NP sent at 12/2/2018  9:54 PM EST -----    NORMAL squeezing function. No significant valve problems. Message left.

## 2018-12-07 RX ORDER — METFORMIN HYDROCHLORIDE 500 MG/1
TABLET, EXTENDED RELEASE ORAL
Qty: 90 TAB | Refills: 3 | Status: SHIPPED | OUTPATIENT
Start: 2018-12-07 | End: 2019-12-09 | Stop reason: SDUPTHER

## 2018-12-12 ENCOUNTER — HOSPITAL ENCOUNTER (OUTPATIENT)
Dept: MAMMOGRAPHY | Age: 83
Discharge: HOME | End: 2018-12-12
Attending: FAMILY MEDICINE
Payer: MEDICARE

## 2018-12-12 DIAGNOSIS — Z12.31 ENCOUNTER FOR SCREENING MAMMOGRAM FOR BREAST CANCER: ICD-10-CM

## 2018-12-12 PROCEDURE — 77067 SCR MAMMO BI INCL CAD: CPT

## 2019-05-28 ENCOUNTER — TELEPHONE (OUTPATIENT)
Dept: FAMILY MEDICINE CLINIC | Age: 84
End: 2019-05-28

## 2019-05-28 NOTE — TELEPHONE ENCOUNTER
----- Message from Maria A Rebolledo sent at 5/28/2019  3:23 PM EDT -----  [place] [facility] [place] like to reschedule her [address] that she has on Monday 7/15 for the next available appointment.  Best contact number is [phone]

## 2019-07-05 NOTE — PERIOP NOTES
[facility]  Ambulatory Surgery Unit  Pre-operative Instructions for Endo Procedures    Procedure Date 7/15/19            Tentative Arrival Time 0815      1. On the day of your procedure, please report to the Ambulatory Surgery Unit Registration Desk and sign in at your designated time. The Ambulatory Surgery Unit is located in [facility] on the [facility] side of the [facility] across from the [facility]. Please have all of your health insurance cards and a photo ID. 2. You must have someone with you to drive you home, as you should not drive a car for 24 hours following anesthesia. Please make arrangements for a responsible adult friend or family member to stay with you for at least the first 24 hours after your procedure. 3. Do not have anything to eat or drink (including water, gum, mints, coffee, juice) after 11:59 PM 7/14/19. This may not apply to medications prescribed by your physician. (Please note below the special instructions with medications to take the morning of your procedure.)    4. If applicable, follow the clear liquid diet and bowel prep instructions provided by your physician's office. If you do not have this information, or have any questions, please contact your physician's office. 5. We recommend you do not drink any alcoholic beverages for 24 hours before and after your procedure. 6. Contact your surgeons office for instructions on the following medications: non-steroidal anti-inflammatory drugs (i.e. Advil, Aleve), vitamins, and supplements. (Some surgeons will want you to stop these medications prior to surgery and others may allow you to take them)   **If you are currently taking Plavix, Coumadin, Aspirin and/or other blood-thinning agents, contact your surgeon for instructions. ** Your surgeon will partner with the physician prescribing these medications to determine if it is safe to stop or if you need to continue taking.  Please do not stop taking these medications without instructions from your surgeon. 7. In an effort to help prevent surgical site infection, we ask that you shower with an anti-bacterial soap (i.e. Dial or Safeguard) on the morning of your procedure. Do not apply any lotions, powders, or deodorants after showering. 8. Wear comfortable clothes. Wear glasses instead of contacts. Do not bring any jewelry or money (other than copays or fees as instructed). Do not wear make-up, particularly mascara, the morning of your procedure. Wear your hair loose or down, no ponytails, buns, ed pins or clips. All body piercings must be removed. 9. You should understand that if you do not follow these instructions your procedure may be cancelled. If your physical condition changes (i.e. fever, cold or flu) please contact your surgeon as soon as possible. 10. It is important that you be on time. If a situation occurs where you may be late, or if you have any questions or problems, please call [phone]. 11. Your procedure time may be subject to change. You will receive a phone call the day prior to confirm your arrival time. Special Instructions: Take all medications and inhalers, as prescribed, on the morning of surgery with a sip of water EXCEPT: metformin      Insulin Dependent Diabetic patients: Take your diabetic medications as prescribed the day before surgery. Hold all diabetic medications the day of surgery. If you are scheduled to arrive for surgery after 8:00 AM, and your AM blood sugar is >200, please call Ambulatory Surgery. I understand a pre-operative phone call will be made to verify my procedure time. In the event that I am not available, I give permission for a message to be left on my answering service and/or with another person?  yes         ___________________      ___________________      ___________________  (Signature of Patient)          (Witness)                   (Date and Time)

## 2019-07-12 ENCOUNTER — ANESTHESIA EVENT (OUTPATIENT)
Dept: SURGERY | Age: 84
End: 2019-07-12
Payer: MEDICARE

## 2019-07-15 ENCOUNTER — ANESTHESIA (OUTPATIENT)
Dept: SURGERY | Age: 84
End: 2019-07-15
Payer: MEDICARE

## 2019-07-15 ENCOUNTER — HOSPITAL ENCOUNTER (OUTPATIENT)
Age: 84
Setting detail: OUTPATIENT SURGERY
Discharge: HOME | End: 2019-07-15
Attending: INTERNAL MEDICINE | Admitting: INTERNAL MEDICINE
Payer: MEDICARE

## 2019-07-15 VITALS
HEIGHT: 62 IN | RESPIRATION RATE: 18 BRPM | WEIGHT: 139 LBS | DIASTOLIC BLOOD PRESSURE: 40 MMHG | TEMPERATURE: 98 F | SYSTOLIC BLOOD PRESSURE: 125 MMHG | BODY MASS INDEX: 25.58 KG/M2 | OXYGEN SATURATION: 95 % | HEART RATE: 47 BPM

## 2019-07-15 LAB
GLUCOSE BLD STRIP.AUTO-MCNC: 100 MG/DL (ref 65–100)
GLUCOSE BLD STRIP.AUTO-MCNC: 106 MG/DL (ref 65–100)
SERVICE CMNT-IMP: ABNORMAL
SERVICE CMNT-IMP: NORMAL

## 2019-07-15 PROCEDURE — 76210000040 HC AMBSU PH I REC FIRST 0.5 HR: Performed by: INTERNAL MEDICINE

## 2019-07-15 PROCEDURE — 82962 GLUCOSE BLOOD TEST: CPT

## 2019-07-15 PROCEDURE — 74011250636 HC RX REV CODE- 250/636: Performed by: ANESTHESIOLOGY

## 2019-07-15 PROCEDURE — 77030021593 HC FCPS BIOP ENDOSC BSC -A: Performed by: INTERNAL MEDICINE

## 2019-07-15 PROCEDURE — 74011250636 HC RX REV CODE- 250/636

## 2019-07-15 PROCEDURE — 88305 TISSUE EXAM BY PATHOLOGIST: CPT

## 2019-07-15 PROCEDURE — 77030020255 HC SOL INJ LR 1000ML BG: Performed by: INTERNAL MEDICINE

## 2019-07-15 PROCEDURE — 76030000002 HC AMB SURG OR TIME FIRST 0.: Performed by: INTERNAL MEDICINE

## 2019-07-15 PROCEDURE — 77030021352 HC CBL LD SYS DISP COVD -B: Performed by: INTERNAL MEDICINE

## 2019-07-15 PROCEDURE — 76060000073 HC AMB SURG ANES FIRST 0.5 HR: Performed by: INTERNAL MEDICINE

## 2019-07-15 PROCEDURE — 76210000046 HC AMBSU PH II REC FIRST 0.5 HR: Performed by: INTERNAL MEDICINE

## 2019-07-15 RX ORDER — FENTANYL CITRATE 50 UG/ML
25 INJECTION, SOLUTION INTRAMUSCULAR; INTRAVENOUS
Status: DISCONTINUED | OUTPATIENT
Start: 2019-07-15 | End: 2019-07-15 | Stop reason: HOSPADM

## 2019-07-15 RX ORDER — LIDOCAINE HYDROCHLORIDE 10 MG/ML
0.1 INJECTION, SOLUTION EPIDURAL; INFILTRATION; INTRACAUDAL; PERINEURAL AS NEEDED
Status: DISCONTINUED | OUTPATIENT
Start: 2019-07-15 | End: 2019-07-15 | Stop reason: HOSPADM

## 2019-07-15 RX ORDER — SODIUM CHLORIDE, SODIUM LACTATE, POTASSIUM CHLORIDE, CALCIUM CHLORIDE 600; 310; 30; 20 MG/100ML; MG/100ML; MG/100ML; MG/100ML
25 INJECTION, SOLUTION INTRAVENOUS CONTINUOUS
Status: DISCONTINUED | OUTPATIENT
Start: 2019-07-15 | End: 2019-07-15 | Stop reason: HOSPADM

## 2019-07-15 RX ORDER — SODIUM CHLORIDE 0.9 % (FLUSH) 0.9 %
5-40 SYRINGE (ML) INJECTION AS NEEDED
Status: DISCONTINUED | OUTPATIENT
Start: 2019-07-15 | End: 2019-07-15 | Stop reason: HOSPADM

## 2019-07-15 RX ORDER — SODIUM CHLORIDE 0.9 % (FLUSH) 0.9 %
5-40 SYRINGE (ML) INJECTION EVERY 8 HOURS
Status: DISCONTINUED | OUTPATIENT
Start: 2019-07-15 | End: 2019-07-15 | Stop reason: HOSPADM

## 2019-07-15 RX ORDER — PROPOFOL 10 MG/ML
INJECTION, EMULSION INTRAVENOUS AS NEEDED
Status: DISCONTINUED | OUTPATIENT
Start: 2019-07-15 | End: 2019-07-15 | Stop reason: HOSPADM

## 2019-07-15 RX ORDER — DIPHENHYDRAMINE HYDROCHLORIDE 50 MG/ML
12.5 INJECTION, SOLUTION INTRAMUSCULAR; INTRAVENOUS AS NEEDED
Status: DISCONTINUED | OUTPATIENT
Start: 2019-07-15 | End: 2019-07-15 | Stop reason: HOSPADM

## 2019-07-15 RX ORDER — ONDANSETRON 2 MG/ML
4 INJECTION INTRAMUSCULAR; INTRAVENOUS AS NEEDED
Status: DISCONTINUED | OUTPATIENT
Start: 2019-07-15 | End: 2019-07-15 | Stop reason: HOSPADM

## 2019-07-15 RX ORDER — LIDOCAINE HYDROCHLORIDE 20 MG/ML
INJECTION, SOLUTION EPIDURAL; INFILTRATION; INTRACAUDAL; PERINEURAL AS NEEDED
Status: DISCONTINUED | OUTPATIENT
Start: 2019-07-15 | End: 2019-07-15 | Stop reason: HOSPADM

## 2019-07-15 RX ADMIN — PROPOFOL 70 MG: 10 INJECTION, EMULSION INTRAVENOUS at 09:43

## 2019-07-15 RX ADMIN — LIDOCAINE HYDROCHLORIDE 40 MG: 20 INJECTION, SOLUTION EPIDURAL; INFILTRATION; INTRACAUDAL; PERINEURAL at 09:29

## 2019-07-15 RX ADMIN — SODIUM CHLORIDE, SODIUM LACTATE, POTASSIUM CHLORIDE, AND CALCIUM CHLORIDE 25 ML/HR: 600; 310; 30; 20 INJECTION, SOLUTION INTRAVENOUS at 08:41

## 2019-07-15 RX ADMIN — PROPOFOL 50 MG: 10 INJECTION, EMULSION INTRAVENOUS at 09:35

## 2019-07-15 RX ADMIN — PROPOFOL 100 MG: 10 INJECTION, EMULSION INTRAVENOUS at 09:29

## 2019-07-15 RX ADMIN — PROPOFOL 40 MG: 10 INJECTION, EMULSION INTRAVENOUS at 09:48

## 2019-07-15 NOTE — ANESTHESIA POSTPROCEDURE EVALUATION
Procedure(s):  flex sig , polypectomy.     general, total IV anesthesia    Anesthesia Post Evaluation      Multimodal analgesia: multimodal analgesia not used between 6 hours prior to anesthesia start to PACU discharge  Patient location during evaluation: bedside  Patient participation: complete - patient participated  Level of consciousness: awake  Pain score: 0  Airway patency: patent  Anesthetic complications: no  Cardiovascular status: acceptable  Respiratory status: acceptable  Hydration status: acceptable  Post anesthesia nausea and vomiting:  none      Vitals Value Taken Time   /37 7/15/2019 10:06 AM   Temp 36.7 °C (98 °F) 7/15/2019  9:58 AM   Pulse 48 7/15/2019 10:06 AM   Resp 15 7/15/2019 10:06 AM   SpO2 96 % 7/15/2019 10:06 AM

## 2019-07-15 NOTE — PROCEDURES
Flexible Sigmoidoscopy    Pre Procedure Diagnoses: constipation, hx of colon polyps    Pre-Medication:  MAC    Post Procedure Diagnoses: adhesions, polyps, internal henmorrhoids    Description of the procedure:  Prior to the procedure its objectives, risks consequences and alternatives were discussed with the patient who then elected to proceed. Digital Rectal Examination was normal .  The Olympus video colonoscope was inserted in the rectum and advanced to what appeared to be the sigmoid colon. .  The colonoscope was slowly and carefully withdrawn as the colon was inspected. Retroflexion in the rectum showed: Internal hemorrhoids      Findings: The scope does not go beyond 30 cm despite using a pediatric scope and using water irrigation. Similar findings were found on her previous colonoscopy. There is easy scope trauma. This is suggestive of adhesive pathology. A few small flat looking polyps are noted in the sigmoid/rectum are. All were removed with a biopsy forceps. Internal hemorrhoids are noted. Complications: None. Plan:    High fiber diet.   FOBT annually   CT vs ACBE only for recurring symptoms with alarm features(blood in stool, weight loss, MOE, significant bowel habit change      Evonne Christie MD  9:58 AM  7/15/2019

## 2019-07-15 NOTE — DISCHARGE INSTRUCTIONS
[place] Office: [phone]    Misti Agarwal  [id number]  1935    EGD/COLONOSCOPY DISCHARGE INSTRUCTIONS  Discomfort:  Sore throat- throat lozenges or warm salt water gargle  redness at IV site- apply warm compress to area; if redness or soreness persist- contact your physician  Gaseous discomfort- walking, belching will help relieve any discomfort  You may not operate a vehicle for 12 hours  You may not engage in an occupation involving machinery or appliances for rest of today. You may not drink alcoholic beverages for at least 12 hours  Avoid making any critical decisions for at least 24 hour  DIET  You may resume your regular diet - however -  remember your colon is empty and a heavy meal will produce gas. Avoid these foods:  fried / greasy foods, excessive carbonated drinks or too much caffeine  MEDICATIONS   Regarding Aspirin or Nonsteroidal medications specifically, please see below. ACTIVITY  You may resume your normal daily activities. Spend the remainder of the day resting -  avoid any strenuous activity. CALL M.D. ANY SIGN OF   Increasing pain, nausea, vomiting  Abdominal distension (swelling)  New increased bleeding (oral or rectal)  Fever (chills)  Pain in chest area  Bloody discharge from nose or mouth  Shortness of breath    You may not take any Advil, Aspirin, Ibuprofen, Motrin, Aleve, or Goodys for 7 days, ONLY  Tylenol as needed for pain. Follow-up Instructions:   Call  Jose David Hoyos MD for any questions or concerns  Results of procedure / biopsy in 7 days   Telephone # [phone]      Follow-up Information    None              DO NOT TAKE SLEEPING MEDICATIONS OR ANTIANXIETY MEDICATIONS WHILE TAKING NARCOTIC PAIN MEDICATIONS,  ESPECIALLY THE NIGHT OF ANESTHESIA. CPAP PATIENTS BE SURE TO WEAR MACHINE WHENEVER NAPPING OR SLEEPING.     DISCHARGE SUMMARY from Nurse    The following personal items collected during your admission are returned to you:   Dental Appliance: Dental Appliances: Lowers, Uppers, With patient  Vision: Visual Aid: Glasses(in PACU)  Hearing Aid:    Jewelry:    Clothing:    Other Valuables:    Valuables sent to safe:        PATIENT INSTRUCTIONS:    After General Anesthesia or Intravenous Sedation, for 24 hours or while taking prescription Narcotics:        Someone should be with you for the next 24 hours. For your own safety, a responsible adult must drive you home. · Limit your activities  · Recommended activity: Rest today, up with assistance today. Do not climb stairs or shower unattended for the next 24 hours. · Please start with a soft bland diet and advance as tolerated (no nausea) to regular diet. · If you have a sore throat you should try the following: fluids, warm salt water gargles, or throat lozenges. If it does not improve after several days please follow up with your primary physician. · Do not drive and operate hazardous machinery  · Do not make important personal or business decisions  · Do  not drink alcoholic beverages  · If you have not urinated within 8 hours after discharge, please contact your surgeon on call. Report the following to your surgeon:  · Excessive pain, swelling, redness or odor of or around the surgical area  · Temperature over 100.5  · Nausea and vomiting lasting longer than 4 hours or if unable to take medications  · Any signs of decreased circulation or nerve impairment to extremity: change in color, persistent  numbness, tingling, coldness or increase pain      · You will receive a Post Operative Call from one of the Recovery Room Nurses on the day after your surgery to check on you. It is very important for us to know how you are recovering after your surgery. If you have an issue or need to speak with someone, please call your surgeon, do not wait for the post operative call. · You may receive an e-mail or letter in the mail from [place] [facility] regarding your experience with us in the Ambulatory Surgery Unit. Your feedback is valuable to us and we appreciate your participation in the survey. · If the above instructions are not adequate, please contact CARI Elizondo, RN Perianesthesia Manager at [phone]. If you are having problems after your surgery, call the physician at his office number. · We wish you a speedy recovery ? What to do at Home:      *  Please give a list of your current medications to your Primary Care Provider. *  Please update this list whenever your medications are discontinued, doses are      changed, or new medications (including over-the-counter products) are added. *  Please carry medication information at all times in case of emergency situations. If you have not received your influenza and/or pneumococcal vaccine, please follow up with your primary care physician. The discharge information has been reviewed with the patient and caregiver. The patient and caregiver verbalized understanding.

## 2019-07-15 NOTE — ANESTHESIA PREPROCEDURE EVALUATION
Anesthetic History   No history of anesthetic complications            Review of Systems / Medical History  Patient summary reviewed, nursing notes reviewed and pertinent labs reviewed    Pulmonary        Sleep apnea: CPAP           Neuro/Psych   Within defined limits           Cardiovascular    Hypertension              Exercise tolerance: <4 METS  Comments: Chronic WAKLER    11/18 Stress test normal    11/18 ECHO= EF  60-65%,severe LVH, grade 1 DD   GI/Hepatic/Renal     GERD (no meds)      Hiatal hernia     Endo/Other    Diabetes: type 2    Cancer (Multiple Myeloma) and anemia (chronic)     Other Findings   Comments: Spinal Stenosis           Physical Exam    Airway  Mallampati: III  TM Distance: 4 - 6 cm  Neck ROM: normal range of motion   Mouth opening: Normal     Cardiovascular    Rhythm: regular  Rate: normal    Murmur (left & right SB): Grade 2     Dental    Dentition: Full upper dentures and Full lower dentures     Pulmonary  Breath sounds clear to auscultation               Abdominal  GI exam deferred       Other Findings            Anesthetic Plan    ASA: 3  Anesthesia type: general and total IV anesthesia          Induction: Intravenous  Anesthetic plan and risks discussed with: Patient      preop glucose 100.  Took beta blocker at 7 am

## 2019-07-15 NOTE — PERIOP NOTES
BS [id number]. Daughter brought to bedside. Pt drinking apple juice. 1028 Discharged to home via/wc,accompanied to car per RN. Skin warm and dry, awake and alert. Respirations even, unlabored. Pt and family members questions and concerns addressed prior to discharge. All belongings (clothing/glasses) with pt.

## 2019-07-15 NOTE — H&P
Pre-endoscopy H and P    The patient was seen and examined in the room/pre-op holding area. The airway was assessed and documented. The problem list, past medical history, and medications were reviewed.      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    Essential hypertension, malignant I10    Mixed hyperlipidemia E78.2    ELEAZAR on CPAP G47.33, Z99.89    Encounter for medication monitoring Z51.81    Type 2 diabetes mellitus without complication (HCC) L65.4    Type 2 diabetes with nephropathy ([facility] [address] E11.21    WALKER (dyspnea on exertion) R06.09     Social History     Socioeconomic History    Marital status:      Spouse name: Not on file    Number of children: Not on file    Years of education: Not on file    Highest education level: Not on file   Occupational History    Not on file   Social Needs    Financial resource strain: Not on file    Food insecurity:     Worry: Not on file     Inability: Not on file    Transportation needs:     Medical: Not on file     Non-medical: Not on file   Tobacco Use    Smoking status: Former Smoker     Last attempt to quit: 1980     Years since quittin.5    Smokeless tobacco: Never Used   Substance and Sexual Activity    Alcohol use: No     Alcohol/week: 0.0 oz    Drug use: No    Sexual activity: Not Currently   Lifestyle    Physical activity:     Days per week: Not on file     Minutes per session: Not on file    Stress: Not on file   Relationships    Social connections:     Talks on phone: Not on file     Gets together: Not on file     Attends Zoroastrianism service: Not on file     Active member of club or organization: Not on file     Attends meetings of clubs or organizations: Not on file     Relationship status: Not on file    Intimate partner violence:     Fear of current or ex partner: Not on file     Emotionally abused: Not on file     Physically abused: Not on file     Forced sexual activity: Not on file   Other Topics Concern    Not on file   Social History Narrative    Lives with  in one story home     Past Medical History:   Diagnosis Date    Anemia NEC     Benign neoplasm of adrenal gland     Chronic anemia 2010    Constipation     on a daily stool softener which pt states helps as of 16    Diabetes ([address]     WALKER (dyspnea on exertion)     as of 16 pt states this is her baseline and is not getting any worse    GERD (gastroesophageal reflux disease)     Goiter     as of 16 Pt states \"I am not even sure I have it\". Pt denies any problems    Gout 2010    as of 16 pt denies any sx    HTN (hypertension) 2010    followed by cardiology Dr Murphy Pardo  [address] Multiple myeloma, without mention of having achieved remission Dx 99    followed by Cristofer Alvarenga    Pure hypercholesterolemia 2010    Sleep apnea 2010    no CPAP    Spinal stenosis 2010         Prior to Admission Medications   Prescriptions Last Dose Informant Patient Reported? Taking? EPOETIN BRYAN (PROCRIT IJ) 6/15/2019 at Unknown time  Yes Yes   Si,000 Units by Injection route every fourteen (14) days. Depending on blood count    LORazepam (ATIVAN) 1 mg tablet Not Taking at Unknown time  Yes No   Sig: Take 1 mg by mouth as needed. allopurinol (ZYLOPRIM) 100 mg tablet 2019 at Unknown time  No Yes   Sig: TAKE 1 TABLET BY MOUTH  EVERY DAY   amLODIPine (NORVASC) 10 mg tablet 7/15/2019 at 0700  No Yes   Sig: TAKE 1 TABLET BY MOUTH ONCE A DAY   ascorbate calcium 500 mg tab 2019 at Unknown time  Yes Yes   Sig: Take 500 mg by mouth daily.    chlorthalidone (HYGROTEN) 25 mg tablet 7/15/2019 at 0700  No Yes   Sig: TAKE 1 TABLET BY MOUTH TWO  TIMES DAILY   cholecalciferol, vitamin d3, (VITAMIN D) 1,000 unit tablet 2019 at Unknown time  Yes Yes   Sig: Take 1,000 Units by mouth daily. cloNIDine HCl (CATAPRES) 0.3 mg tablet 2019 at Unknown time  No Yes   Sig: TAKE ONE HALF TABLET DAILY  AFTER LUNCH AND TAKE 1  TABLET EVERY NIGHT AT  BEDTIME   cyanocobalamin (VITAMIN B-12) 100 mcg tablet 2019 at Unknown time  Yes Yes   Sig: Take 100 mcg by mouth daily. fluticasone (FLONASE) 50 mcg/actuation nasal spray 2019 at Unknown time  Yes Yes   Si Sprays by Both Nostrils route. Daily as needed   meclizine (ANTIVERT) 12.5 mg tablet Not Taking at Unknown time  No No   Sig: Take 1 Tab by mouth three (3) times daily as needed. Take as needed for dizziness. metFORMIN ER (GLUCOPHAGE XR) 500 mg tablet 2019 at Unknown time  No Yes   Sig: TAKE 1 TABLET BY MOUTH  DAILY WITH DINNER   metoprolol tartrate (LOPRESSOR) 100 mg IR tablet 7/15/2019 at 0700  No Yes   Sig: TAKE 1 TABLET BY MOUTH  TWICE A DAY   minoxidil (LONITEN) 10 mg tablet 7/15/2019 at 0700  No Yes   Sig: TAKE ONE-HALF TABLET BY  MOUTH DAILY   omega-3 fatty acids-vitamin e (FISH OIL) 1,000 mg Cap 2019 at Unknown time  Yes Yes   Sig: Take 1 Tab by mouth two (2) times a day. ondansetron (ZOFRAN ODT) 4 mg disintegrating tablet Not Taking at Unknown time  Yes No   Sig: Take 4 mg by mouth every eight (8) hours as needed for Nausea. polyethylene glycol (MIRALAX) 17 gram packet 2019 at Unknown time  Yes Yes   Sig: Take 17 g by mouth daily as needed. rosuvastatin (CRESTOR) 5 mg tablet 2019 at Unknown time  No Yes   Sig: TAKE ONE TABLET BY MOUTH  NIGHTLY. spironolactone (ALDACTONE) 25 mg tablet Not Taking at 0700  No No   Sig: TAKE 1 TABLET BY MOUTH TWO  TIMES DAILY   traMADol (ULTRAM) 50 mg tablet Not Taking at Unknown time  No No   Sig: Take 1 Tab by mouth every six (6) hours as needed for Pain. Max Daily Amount: 200 mg.       Facility-Administered Medications: None           The review of systems is:  Negative  for shortness of breath or chest pain      The heart, lungs, and mental status were satisfactory for the administration of deep sedation and for the procedure. I discussed with the patient the objectives, risks, consequences and alternatives to the procedure.       Sandy Nelson MD  7/15/2019  9:19 AM

## 2019-08-12 NOTE — PROGRESS NOTES
This is a Subsequent Medicare Annual Wellness Exam (AWV) (Performed 12 months after IPPE or effective date of Medicare Part B enrollment)    I have reviewed the patient's medical history in detail and updated the computerized patient record. Cardiovascular Review:  The patient has hypertension, ELEAZAR (but has not been using the CPAP) and hyperlipidemia. Denies chest pain or chest tightness. Has some SOB more when she is more active but this has been stable. No cough or congestion noted. Diet and Lifestyle: generally follows a low fat low cholesterol diet, generally follows a low sodium diet, follows a diabetic diet regularly  Home BP Monitoring: is well controlled at home, ranging 130s's/70-80's. Pertinent ROS: taking medications as instructed, no medication side effects noted, no TIA's, no chest pain on exertion, no dyspnea on exertion, no swelling of ankles. Diabetes Mellitus:  She has diabetes mellitus. Diabetic ROS - diabetic diet compliance: compliant most of the time, home glucose monitoring: is performed regularly, fasting values range low 100s,  Pt does not have BS log with her today,   Further diabetic ROS: no polyuria or polydipsia, no chest pain, dyspnea or TIA's, no numbness, tingling or pain in extremities, no unusual visual symptoms, no hypoglycemia. Lab review: orders written for new lab studies as appropriate; see orders. Chronic anemia and multiple myeloma in remission is being followed by hematology. Osteoarthritis:  Patient has osteoarthritis. She also had known spinal stenosis. She has been having pains in several joints that migrate and comes and goes but overall has been stable. .    Symptoms onset: problem is longstanding. Rheumatological ROS: stable, mild-to-moderate joint symptoms intermittently, reasonably well controlled by PRN meds. Response to treatment plan: stable and intermittent.      HM:  Microalbumin 11/2/2018   Mammogram 12/12/2018   Bone density 5/19/2014   Colonoscopy 7/15/2019 by  Tamanna Arellano and was told she did not need another unless she had a problem.   Patient states last eye exam was 2/2019 by MD at Dr. Krystal Shin office. History     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    Essential hypertension, malignant I10    Mixed hyperlipidemia E78.2    ELEAZAR on CPAP G47.33, Z99.89    Encounter for medication monitoring Z51.81    Type 2 diabetes mellitus without complication (HCC) O27.8    Type 2 diabetes with nephropathy (HCC) E11.21    WALKER (dyspnea on exertion) R06.09       Current Outpatient Medications   Medication Sig Dispense Refill    metFORMIN ER (GLUCOPHAGE XR) 500 mg tablet TAKE 1 TABLET BY MOUTH  DAILY WITH DINNER 90 Tab 3    rosuvastatin (CRESTOR) 5 mg tablet TAKE ONE TABLET BY MOUTH  NIGHTLY. 90 Tab 3    metoprolol tartrate (LOPRESSOR) 100 mg IR tablet TAKE 1 TABLET BY MOUTH  TWICE A  Tab 3    ascorbate calcium 500 mg tab Take 500 mg by mouth daily.  LORazepam (ATIVAN) 1 mg tablet Take 1 mg by mouth as needed.  amLODIPine (NORVASC) 10 mg tablet TAKE 1 TABLET BY MOUTH ONCE A DAY 90 Tab 3    spironolactone (ALDACTONE) 25 mg tablet TAKE 1 TABLET BY MOUTH TWO  TIMES DAILY 180 Tab 3    cloNIDine HCl (CATAPRES) 0.3 mg tablet TAKE ONE HALF TABLET DAILY  AFTER LUNCH AND TAKE 1  TABLET EVERY NIGHT AT  BEDTIME 135 Tab 3    allopurinol (ZYLOPRIM) 100 mg tablet TAKE 1 TABLET BY MOUTH  EVERY DAY 90 Tab 3    minoxidil (LONITEN) 10 mg tablet TAKE ONE-HALF TABLET BY  MOUTH DAILY 45 Tab 3    chlorthalidone (HYGROTEN) 25 mg tablet TAKE 1 TABLET BY MOUTH TWO  TIMES DAILY 180 Tab 3    meclizine (ANTIVERT) 12.5 mg tablet Take 1 Tab by mouth three (3) times daily as needed. Take as needed for dizziness.  30 Tab 0    ondansetron (ZOFRAN ODT) 4 mg disintegrating tablet Take 4 mg by mouth every eight (8) hours as needed for Nausea.  fluticasone (FLONASE) 50 mcg/actuation nasal spray 2 Sprays by Both Nostrils route. Daily as needed      traMADol (ULTRAM) 50 mg tablet Take 1 Tab by mouth every six (6) hours as needed for Pain. Max Daily Amount: 200 mg. 30 Tab 1    polyethylene glycol (MIRALAX) 17 gram packet Take 17 g by mouth daily as needed.  cyanocobalamin (VITAMIN B-12) 100 mcg tablet Take 100 mcg by mouth daily.  EPOETIN BRYAN (PROCRIT IJ) 1,000 Units by Injection route every fourteen (14) days. Depending on blood count       omega-3 fatty acids-vitamin e (FISH OIL) 1,000 mg Cap Take 1 Tab by mouth two (2) times a day.  cholecalciferol, vitamin d3, (VITAMIN D) 1,000 unit tablet Take 1,000 Units by mouth daily. Allergies   Allergen Reactions    Pcn [Penicillins] Swelling     tongue    Bactrim [Sulfamethoxazole-Trimethoprim] Diarrhea    Ceftin [Cefuroxime Axetil] Diarrhea    Hydralazine Other (comments)     edema    Sulfa (Sulfonamide Antibiotics) Itching       Past Medical History:   Diagnosis Date    Anemia NEC     Benign neoplasm of adrenal gland     Chronic anemia 6/1/2010    Constipation     on a daily stool softener which pt states helps as of 4/27/16    Diabetes ([address]     WALKER (dyspnea on exertion)     as of 4/27/16 pt states this is her baseline and is not getting any worse    GERD (gastroesophageal reflux disease)     Goiter 2011    as of 4/27/16 Pt states \"I am not even sure I have it\".   Pt denies any problems    Gout 6/1/2010    as of 4/27/16 pt denies any sx    HTN (hypertension) 6/1/2010    followed by cardiology Dr Ganga Odom  [address] [place] Multiple myeloma, without mention of having achieved remission Dx 11/1/99    followed by Samson Delgadillo    Pure hypercholesterolemia 6/1/2010    Sleep apnea 6/1/2010    no CPAP    Spinal stenosis 6/1/2010       Past Surgical History:   Procedure Laterality Date    COLONOSCOPY N/A 7/15/2019    flex sig , polypectomy performed by [facility]jinder [facility] at [facility]    HX HYSTERECTOMY  1970s    HX ORTHOPAEDIC  2005    left hand-trigger finger    HX ORTHOPAEDIC      right hand- trigger finger    CT COLONOSCOPY FLX DX W/COLLJ SPEC WHEN PFRMD  08/10/2010           Family History   Problem Relation Age of Onset    Hypertension Mother     No Known Problems Father     Cancer Sister 52        colon     Diabetes Sister     Kidney Disease Sister     Hypertension Sister     Elevated Lipids Sister        Social History     Tobacco Use    Smoking status: Former Smoker     Last attempt to quit: 1980     Years since quittin.6    Smokeless tobacco: Never Used   Substance Use Topics    Alcohol use: No     Alcohol/week: 0.0 standard drinks       Lab Results   Component Value Date/Time    WBC 4.7 2018 10:04 AM    HGB 10.6 (L) 2018 10:04 AM    HCT 33.3 (L) 2018 10:04 AM    PLATELET 355  10:04 AM    MCV 93 2018 10:04 AM     Lab Results   Component Value Date/Time    Cholesterol, total 136 2018 10:04 AM    HDL Cholesterol 41 2018 10:04 AM    LDL, calculated 74 2018 10:04 AM    LDL-C, External 57 10/14/2015    Triglyceride 104 2018 10:04 AM    CHOL/HDL Ratio 2.8 2010 09:57 AM     Lab Results   Component Value Date/Time    TSH 2.860 2011 09:29 AM      Lab Results   Component Value Date/Time    Sodium 139 2018 10:04 AM    Potassium 4.2 2018 10:04 AM    Chloride 104 2018 10:04 AM    CO2 23 2018 10:04 AM    Anion gap 9 2010 04:04 PM    Glucose 90 2018 10:04 AM    BUN 25 2018 10:04 AM    Creatinine 1.16 (H) 2018 10:04 AM    BUN/Creatinine ratio 22 2018 10:04 AM    GFR est AA 50 (L) 2018 10:04 AM    GFR est non-AA 44 (L) 2018 10:04 AM    Calcium 9.9 2018 10:04 AM    Bilirubin, total 0.7 2018 10:04 AM ALT (SGPT) 10 11/02/2018 10:04 AM    AST (SGOT) 11 11/02/2018 10:04 AM    Alk. phosphatase 43 11/02/2018 10:04 AM    Protein, total 8.7 (H) 11/02/2018 10:04 AM    Albumin 4.3 11/02/2018 10:04 AM    Globulin 5.6 (H) 06/02/2010 09:57 AM    A-G Ratio 1.0 (L) 11/02/2018 10:04 AM      Lab Results   Component Value Date/Time    Hemoglobin A1c 7.4 (H) 10/28/2016 10:49 AM    Hemoglobin A1c (POC) 6.3 11/02/2018 10:04 AM    Hemoglobin A1c, External 6.4 10/14/2015         Depression Risk Factor Screening:     PHQ over the last two weeks 11/7/2017   Little interest or pleasure in doing things Not at all   Feeling down, depressed or hopeless Not at all   Total Score PHQ 2 0     Alcohol Risk Factor Screening: You do not drink alcohol or very rarely. Functional Ability and Level of Safety:   Hearing Loss  Hearing is good. Activities of Daily Living  The home contains: no safety equipment. Patient does total self care    Fall RiskFall Risk Assessment, last 12 mths 11/7/2017   Able to walk? Yes   Fall in past 12 months? No     Functional Ability:   Does the patient exhibit a steady gait? yes    How long did it take the patient to get up and walk from a sitting position? seconds    Is the patient self reliant? (ie can do own laundry, meals, household chores)  yes   Does the patient handle his/her own medications? yes   Does the patient handle his/her own money? yes   Is the patients home safe (ie good lighting, handrails on stairs and bath, etc.)? yes   Did you notice or did patient express any hearing difficulties? no   Did you notice or did patient express any vision difficulties? no        Advance Care Planning:   Patient was offered the opportunity to discuss advance care planning:  yes    Does patient have an Advance Directive:  no   If no, did you provide information on Caring Connections?   yes      Abuse Screen  Patient is not abused    Cognitive Screening   Evaluation of Cognitive Function:  Has your family/caregiver stated any concerns about your memory: no    Review of Systems   Constitutional: negative for fatigue and malaise  Eyes: negative for irritation and redness  Ears, nose, mouth, throat, and face: negative for earaches, nasal congestion and hoarseness  Respiratory: negative for cough, sputum or dyspnea on exertion  Cardiovascular: negative for chest pain, chest pressure/discomfort, dyspnea, palpitations, irregular heart beats, fatigue, lower extremity edema  Gastrointestinal: negative for dysphagia, dyspepsia, reflux symptoms, nausea, vomiting, change in bowel habits, melena, constipation and abdominal pain  Genitourinary:negative for frequency, dysuria, nocturia, urinary incontinence Integument/breast: negative for rash and dryness  Hematologic/lymphatic: negative for easy bruising and lymphadenopathy  Neurological: negative for headaches, dizziness, tremor and weakness  Endocrine: negative for diabetic symptoms including polyuria and polydipsia      Physical Examination     Evaluation of Cognitive Function:  Mood/affect:  neutral  Appearance: age appropriate  Family member/caregiver input: NA    Visit Vitals  /60 (BP 1 Location: Left arm, BP Patient Position: Sitting)   Pulse (!) 56   Temp 97.8 °F (36.6 °C) (Oral)   Resp 16   Ht 5' 2\" (1.575 m)   Wt 141 lb 6.4 oz (64.1 kg)   SpO2 98%   BMI 25.86 kg/m²       General:  Alert, cooperative, no distress, appears stated age. Head:  Normocephalic, without obvious abnormality, atraumatic. Ears:  Normal TMs and external ear canals both ears. Nose: Nares normal. Septum midline. Mucosa normal. No drainage or sinus tenderness. Throat: Lips, mucosa, and tongue normal. Teeth and gums normal.   Neck: Supple, symmetrical, trachea midline, no adenopathy, thyroid: no enlargement/tenderness/nodules, no carotid bruit and no JVD. Back:   Symmetric, no curvature. ROM normal. No CVA tenderness. Lungs:   Clear to auscultation bilaterally.    Chest wall:  No tenderness or deformity. Heart:  Regular rate and rhythm, S1, S2 normal, II/VI murmur, click, rub or gallop. Breast Exam:  No tenderness, masses, or nipple abnormality. Abdomen:   Soft, non-tender. Bowel sounds normal. No masses,  No organomegaly. Rectal:  Normal tone,  no masses or tenderness  Guaiac negative stool. Extremities: Extremities normal, atraumatic, no cyanosis or edema. Pulses: 2+ and symmetric all extremities. Skin: Skin color, texture, turgor normal. No rashes or lesions. Lymph nodes: Cervical, supraclavicular, and axillary nodes normal.   Neurologic: CNII-XII intact. Normal strength, sensation and reflexes throughout. Patient Care Team   Patient Care Team:  Marques Yang MD as PCP - General    Assessment/Plan   Education and counseling provided:  Are appropriate based on today's review and evaluation  End-of-Life planning (with patient's consent)    ASSESSMENT and PLAN  Diagnoses and all orders for this visit:    1. Medicare annual wellness visit, subsequent  -     AMB POC URINALYSIS DIP STICK AUTO W/ MICRO    2. Essential hypertension  Stable   Discussed sodium restriction, high k rich diet, maintaining ideal body weight and regular exercise program such as daily walking 30 min perday 4-5 times per week, as physiologic means to achieve blood pressure control.  Medication compliance advised. 3. Type 2 diabetes mellitus without complication, without long-term current use of insulin (HCC)  Stable at 6.3%  -     AMB POC HEMOGLOBIN A1C    4. Mixed hyperlipidemia  -     LIPID PANEL    5. Chronic anemia  -     CBC W/O DIFF    6. Spinal stenosis of lumbar region without neurogenic claudication//  7. Primary osteoarthritis involving multiple joints  Stable     8. ELEAZAR (obstructive sleep apnea)  She will plan follow up with sleep specialist.      9. Encounter for medication monitoring  -     METABOLIC PANEL, COMPREHENSIVE    10.  Need for shingles vaccine  - varicella-zoster recombinant, PF, (SHINGRIX) 50 mcg/0.5 mL susr injection; 0.5 mL by IntraMUSCular route once for 1 dose. Repeat second dose in 6 months. Follow-up and Dispositions    · Return in about 4 months (around 12/13/2019). reviewed diet, exercise and weight control  cardiovascular risk and specific lipid/LDL goals reviewed  reviewed medications and side effects in detail  specific diabetic recommendations: low cholesterol diet, weight control and daily exercise discussed, all medications, side effects and compliance discussed carefully, foot care discussed and Podiatry visits discussed, annual eye examinations at Ophthalmology discussed and glycohemoglobin and other lab monitoring discussed    I have discussed diagnosis listed in this note with pt and/or family. I have discussed treatment plans and options and the risk/benefit analysis of those options, including safe use of medications and possible medication side effects. Through the use of shared decision making we have agreed to the above plan. The patient has received an after-visit summary and questions were answered concerning future plans and follow up. Advise pt of any urgent changes then to proceed to the ER.

## 2019-08-13 ENCOUNTER — OFFICE VISIT (OUTPATIENT)
Dept: FAMILY MEDICINE CLINIC | Age: 84
End: 2019-08-13

## 2019-08-13 ENCOUNTER — HOSPITAL ENCOUNTER (OUTPATIENT)
Dept: LAB | Age: 84
Discharge: HOME | End: 2019-08-13
Payer: MEDICARE

## 2019-08-13 VITALS
HEIGHT: 62 IN | BODY MASS INDEX: 26.02 KG/M2 | RESPIRATION RATE: 16 BRPM | TEMPERATURE: 97.8 F | WEIGHT: 141.4 LBS | HEART RATE: 56 BPM | OXYGEN SATURATION: 98 % | DIASTOLIC BLOOD PRESSURE: 60 MMHG | SYSTOLIC BLOOD PRESSURE: 131 MMHG

## 2019-08-13 DIAGNOSIS — M48.061 SPINAL STENOSIS OF LUMBAR REGION WITHOUT NEUROGENIC CLAUDICATION: ICD-10-CM

## 2019-08-13 DIAGNOSIS — I10 ESSENTIAL HYPERTENSION: ICD-10-CM

## 2019-08-13 DIAGNOSIS — M15.9 PRIMARY OSTEOARTHRITIS INVOLVING MULTIPLE JOINTS: ICD-10-CM

## 2019-08-13 DIAGNOSIS — G47.33 OSA (OBSTRUCTIVE SLEEP APNEA): ICD-10-CM

## 2019-08-13 DIAGNOSIS — Z23 NEED FOR SHINGLES VACCINE: ICD-10-CM

## 2019-08-13 DIAGNOSIS — E78.2 MIXED HYPERLIPIDEMIA: ICD-10-CM

## 2019-08-13 DIAGNOSIS — E11.9 TYPE 2 DIABETES MELLITUS WITHOUT COMPLICATION, WITHOUT LONG-TERM CURRENT USE OF INSULIN (HCC): ICD-10-CM

## 2019-08-13 DIAGNOSIS — Z51.81 ENCOUNTER FOR MEDICATION MONITORING: ICD-10-CM

## 2019-08-13 DIAGNOSIS — Z00.00 MEDICARE ANNUAL WELLNESS VISIT, SUBSEQUENT: Primary | ICD-10-CM

## 2019-08-13 DIAGNOSIS — D64.9 CHRONIC ANEMIA: ICD-10-CM

## 2019-08-13 LAB
BILIRUB UR QL STRIP: NEGATIVE
GLUCOSE UR-MCNC: NEGATIVE MG/DL
HBA1C MFR BLD HPLC: 6.3 %
KETONES P FAST UR STRIP-MCNC: NEGATIVE MG/DL
PH UR STRIP: 5 [PH] (ref 4.6–8)
PROT UR QL STRIP: NORMAL
SP GR UR STRIP: 1.01 (ref 1–1.03)
UA UROBILINOGEN AMB POC: NORMAL (ref 0.2–1)
URINALYSIS CLARITY POC: CLEAR
URINALYSIS COLOR POC: YELLOW
URINE BLOOD POC: NEGATIVE
URINE LEUKOCYTES POC: NEGATIVE
URINE NITRITES POC: NEGATIVE

## 2019-08-13 PROCEDURE — 36415 COLL VENOUS BLD VENIPUNCTURE: CPT

## 2019-08-13 PROCEDURE — 85027 COMPLETE CBC AUTOMATED: CPT

## 2019-08-13 PROCEDURE — 80053 COMPREHEN METABOLIC PANEL: CPT

## 2019-08-13 PROCEDURE — 80061 LIPID PANEL: CPT

## 2019-08-13 RX ORDER — MUPIROCIN CALCIUM 20 MG/G
CREAM TOPICAL
Qty: 30 G | Refills: 1 | Status: SHIPPED | OUTPATIENT
Start: 2019-08-13

## 2019-08-13 RX ORDER — MUPIROCIN CALCIUM 20 MG/G
CREAM TOPICAL
COMMUNITY
End: 2019-08-13 | Stop reason: SDUPTHER

## 2019-08-13 NOTE — PROGRESS NOTES
Chief Complaint   Patient presents with    Annual Wellness Visit     Medicare Wellness       A1C 11/2/2018    Microalbumin 11/2/2018    Mammogram 12/12/2018    Bone density 5/19/2014    Colonoscopy 7/15/2019 by  Agnieszka Rodriguez and was told she did not need another unless she had a problem. Patient states last eye exam was 2/2019 by MD at Dr. Lida Condon office. 1. Have you been to the ER, urgent care clinic since your last visit? Hospitalized since your last visit? no    2. Have you seen or consulted any other health care providers outside of the [address] [place] [address] since your last visit? Include any pap smears or colon screening.  no

## 2019-08-14 LAB
ALBUMIN SERPL-MCNC: 4.3 G/DL (ref 3.5–4.7)
ALBUMIN/GLOB SERPL: 0.9 {RATIO} (ref 1.2–2.2)
ALP SERPL-CCNC: 44 IU/L (ref 39–117)
ALT SERPL-CCNC: 8 IU/L (ref 0–32)
AST SERPL-CCNC: 11 IU/L (ref 0–40)
BILIRUB SERPL-MCNC: 0.4 MG/DL (ref 0–1.2)
BUN SERPL-MCNC: 28 MG/DL (ref 8–27)
BUN/CREAT SERPL: 19 (ref 12–28)
CALCIUM SERPL-MCNC: 9.8 MG/DL (ref 8.7–10.3)
CHLORIDE SERPL-SCNC: 107 MMOL/L (ref 96–106)
CHOLEST SERPL-MCNC: 115 MG/DL (ref 100–199)
CO2 SERPL-SCNC: 21 MMOL/L (ref 20–29)
CREAT SERPL-MCNC: 1.47 MG/DL (ref 0.57–1)
ERYTHROCYTE [DISTWIDTH] IN BLOOD BY AUTOMATED COUNT: 15.3 % (ref 12.3–15.4)
GLOBULIN SER CALC-MCNC: 4.7 G/DL (ref 1.5–4.5)
GLUCOSE SERPL-MCNC: 118 MG/DL (ref 65–99)
HCT VFR BLD AUTO: 33 % (ref 34–46.6)
HDLC SERPL-MCNC: 35 MG/DL
HGB BLD-MCNC: 10.3 G/DL (ref 11.1–15.9)
INTERPRETATION, 910389: NORMAL
INTERPRETATION: NORMAL
LDLC SERPL CALC-MCNC: 56 MG/DL (ref 0–99)
MCH RBC QN AUTO: 28.2 PG (ref 26.6–33)
MCHC RBC AUTO-ENTMCNC: 31.2 G/DL (ref 31.5–35.7)
MCV RBC AUTO: 90 FL (ref 79–97)
PDF IMAGE, 910387: NORMAL
PLATELET # BLD AUTO: 127 X10E3/UL (ref 150–450)
POTASSIUM SERPL-SCNC: 4.6 MMOL/L (ref 3.5–5.2)
PROT SERPL-MCNC: 9 G/DL (ref 6–8.5)
RBC # BLD AUTO: 3.65 X10E6/UL (ref 3.77–5.28)
SODIUM SERPL-SCNC: 139 MMOL/L (ref 134–144)
TRIGL SERPL-MCNC: 120 MG/DL (ref 0–149)
VLDLC SERPL CALC-MCNC: 24 MG/DL (ref 5–40)
WBC # BLD AUTO: 5.2 X10E3/UL (ref 3.4–10.8)

## 2019-09-03 RX ORDER — CHLORTHALIDONE 25 MG/1
TABLET ORAL
Qty: 180 TAB | Refills: 3 | Status: SHIPPED | OUTPATIENT
Start: 2019-09-03 | End: 2020-07-17

## 2019-09-03 RX ORDER — ALLOPURINOL 100 MG/1
TABLET ORAL
Qty: 90 TAB | Refills: 3 | Status: SHIPPED | OUTPATIENT
Start: 2019-09-03 | End: 2020-07-17

## 2019-12-09 RX ORDER — METFORMIN HYDROCHLORIDE 500 MG/1
TABLET, EXTENDED RELEASE ORAL
Qty: 90 TAB | Refills: 3 | Status: SHIPPED | OUTPATIENT
Start: 2019-12-09 | End: 2020-11-24

## 2019-12-12 NOTE — PROGRESS NOTES
HISTORY OF PRESENT ILLNESS  Suly Cardoso is a 80 y.o. female. HPI   Follow up on chronic medical problems. Overall feeling well. Cardiovascular Review:  The patient has hypertension, ELEAZAR (but has not been using the CPAP) and hyperlipidemia. She c/o feeling more SOB over the past couple of months. Denies chest pain or chest tightness. SOB more when she is more active. No cough or congestion noted. Diet and Lifestyle: generally follows a low fat low cholesterol diet, generally follows a low sodium diet, follows a diabetic diet regularly  Home BP Monitoring: is well controlled at home, ranging 130s's/70-80's. Pertinent ROS: taking medications as instructed, no medication side effects noted, no TIA's, no chest pain on exertion, no dyspnea on exertion, no swelling of ankles. Diabetes Mellitus:  She has diabetes mellitus. Diabetic ROS - diabetic diet compliance: compliant most of the time, home glucose monitoring: is performed regularly, fasting values range low 100s,  Pt does not have BS log with her today, further diabetic ROS: no polyuria or polydipsia, no chest pain, dyspnea or TIA's, no numbness, tingling or pain in extremities, no unusual visual symptoms, no hypoglycemia. Lab review: orders written for new lab studies as appropriate; see orders. Chronic anemia and multiple myeloma in remission is being followed by hematology. Osteoarthritis:  Patient has osteoarthritis. She also had know spinal stenosis. She has been having pains in several joints that migrate and comes and goes. Overall the pain in the joints and lower back has been doing good with minimal pain. Symptoms onset: problem is longstanding. Rheumatological ROS: stable, mild-to-moderate joint symptoms intermittently, reasonably well controlled by PRN meds. Response to treatment plan: stable and intermittent.        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    Essential hypertension, malignant I10    Mixed hyperlipidemia E78.2    ELEAZAR on CPAP G47.33, Z99.89    Encounter for medication monitoring Z51.81    Type 2 diabetes mellitus without complication (HCC) G64.1    Type 2 diabetes with nephropathy (HCC) E11.21    WALKER (dyspnea on exertion) R06.09       Current Outpatient Medications   Medication Sig Dispense Refill    metFORMIN ER (GLUCOPHAGE XR) 500 mg tablet TAKE 1 TABLET BY MOUTH  DAILY WITH DINNER 90 Tab 3    allopurinol (ZYLOPRIM) 100 mg tablet TAKE 1 TABLET BY MOUTH  EVERY DAY 90 Tab 3    chlorthalidone (HYGROTEN) 25 mg tablet TAKE 1 TABLET BY MOUTH TWO  TIMES DAILY 180 Tab 3    mupirocin calcium (BACTROBAN) 2 % topical cream Apply  to affected area two (2) times daily as needed (skin irritation). 30 g 1    rosuvastatin (CRESTOR) 5 mg tablet TAKE ONE TABLET BY MOUTH  NIGHTLY. 90 Tab 3    metoprolol tartrate (LOPRESSOR) 100 mg IR tablet TAKE 1 TABLET BY MOUTH  TWICE A  Tab 3    ascorbate calcium 500 mg tab Take 500 mg by mouth daily.  amLODIPine (NORVASC) 10 mg tablet TAKE 1 TABLET BY MOUTH ONCE A DAY 90 Tab 3    spironolactone (ALDACTONE) 25 mg tablet TAKE 1 TABLET BY MOUTH TWO  TIMES DAILY 180 Tab 3    cloNIDine HCl (CATAPRES) 0.3 mg tablet TAKE ONE HALF TABLET DAILY  AFTER LUNCH AND TAKE 1  TABLET EVERY NIGHT AT  BEDTIME 135 Tab 3    minoxidil (LONITEN) 10 mg tablet TAKE ONE-HALF TABLET BY  MOUTH DAILY 45 Tab 3    meclizine (ANTIVERT) 12.5 mg tablet Take 1 Tab by mouth three (3) times daily as needed. Take as needed for dizziness. 30 Tab 0    ondansetron (ZOFRAN ODT) 4 mg disintegrating tablet Take 4 mg by mouth every eight (8) hours as needed for Nausea.  fluticasone (FLONASE) 50 mcg/actuation nasal spray 2 Sprays by Both Nostrils route daily as needed.       polyethylene glycol (MIRALAX) 17 gram packet Take 17 g by mouth daily as needed.  cyanocobalamin (VITAMIN B-12) 100 mcg tablet Take 100 mcg by mouth daily.  EPOETIN BRYAN (PROCRIT IJ) 1,000 Units by Injection route every fourteen (14) days. Depending on blood count       omega-3 fatty acids-vitamin e (FISH OIL) 1,000 mg Cap Take 1 Tab by mouth two (2) times a day.  cholecalciferol, vitamin d3, (VITAMIN D) 1,000 unit tablet Take 1,000 Units by mouth daily. Allergies   Allergen Reactions    Pcn [Penicillins] Swelling     tongue    Bactrim [Sulfamethoxazole-Trimethoprim] Diarrhea    Ceftin [Cefuroxime Axetil] Diarrhea    Hydralazine Other (comments)     edema    Sulfa (Sulfonamide Antibiotics) Itching       Past Medical History:   Diagnosis Date    Anemia NEC     Benign neoplasm of adrenal gland     Chronic anemia 6/1/2010    Constipation     on a daily stool softener which pt states helps as of 4/27/16    Diabetes ([address]     WALKER (dyspnea on exertion)     as of 4/27/16 pt states this is her baseline and is not getting any worse    GERD (gastroesophageal reflux disease)     Goiter 2011    as of 4/27/16 Pt states \"I am not even sure I have it\".   Pt denies any problems    Gout 6/1/2010    as of 4/27/16 pt denies any sx    HTN (hypertension) 6/1/2010    followed by cardiology Dr Keeley Harrell  [phone]    Multiple myeloma, without mention of having achieved remission Dx 11/1/99    followed by Rajat Mcbride    Pure hypercholesterolemia 6/1/2010    Sleep apnea 6/1/2010    no CPAP    Spinal stenosis 6/1/2010       Past Surgical History:   Procedure Laterality Date    COLONOSCOPY N/A 7/15/2019    flex sig , polypectomy performed by Samia Azevedo MD at [facility] HX HYSTERECTOMY  1970s    HX ORTHOPAEDIC  2005    left hand-trigger finger    HX ORTHOPAEDIC  2007    right hand- trigger finger    KS COLONOSCOPY FLX DX W/COLLJ SPEC WHEN PFRMD  08/10/2010           Family History Problem Relation Age of Onset    Hypertension Mother     No Known Problems Father     Cancer Sister 52        colon     Diabetes Sister     Kidney Disease Sister     Hypertension Sister     Elevated Lipids Sister        Social History     Tobacco Use    Smoking status: Former Smoker     Last attempt to quit: 1980     Years since quittin.9    Smokeless tobacco: Never Used   Substance Use Topics    Alcohol use: No     Alcohol/week: 0.0 standard drinks        Lab Results   Component Value Date/Time    WBC 5.2 2019 09:12 AM    HGB 10.3 (L) 2019 09:12 AM    HCT 33.0 (L) 2019 09:12 AM    PLATELET 043 (L)  09:12 AM    MCV 90 2019 09:12 AM     Lab Results   Component Value Date/Time    Cholesterol, total 115 2019 09:12 AM    HDL Cholesterol 35 (L) 2019 09:12 AM    LDL, calculated 56 2019 09:12 AM    LDL-C, External 57 10/14/2015    Triglyceride 120 2019 09:12 AM    CHOL/HDL Ratio 2.8 2010 09:57 AM     Lab Results   Component Value Date/Time    TSH 2.860 2011 09:29 AM      Lab Results   Component Value Date/Time    Sodium 139 2019 09:12 AM    Potassium 4.6 2019 09:12 AM    Chloride 107 (H) 2019 09:12 AM    CO2 21 2019 09:12 AM    Anion gap 9 2010 04:04 PM    Glucose 118 (H) 2019 09:12 AM    BUN 28 (H) 2019 09:12 AM    Creatinine 1.47 (H) 2019 09:12 AM    BUN/Creatinine ratio 19 2019 09:12 AM    GFR est AA 38 (L) 2019 09:12 AM    GFR est non-AA 33 (L) 2019 09:12 AM    Calcium 9.8 2019 09:12 AM    Bilirubin, total 0.4 2019 09:12 AM    ALT (SGPT) 8 2019 09:12 AM    AST (SGOT) 11 2019 09:12 AM    Alk.  phosphatase 44 2019 09:12 AM    Protein, total 9.0 (H) 2019 09:12 AM    Albumin 4.3 2019 09:12 AM    Globulin 5.6 (H) 2010 09:57 AM    A-G Ratio 0.9 (L) 2019 09:12 AM      Lab Results   Component Value Date/Time Hemoglobin A1c 7.4 (H) 10/28/2016 10:49 AM    Hemoglobin A1c (POC) 6.3 08/13/2019 09:16 AM    Hemoglobin A1c, External 6.4 10/14/2015         Review of Systems   Constitutional: Negative for malaise/fatigue. HENT: Negative for congestion. Eyes: Negative for blurred vision. Respiratory: Negative for cough and shortness of breath. Cardiovascular: Negative for chest pain, palpitations and leg swelling. Gastrointestinal: Negative for abdominal pain, constipation and heartburn. Genitourinary: Negative for dysuria, frequency and urgency. Neurological: Negative for dizziness, tingling and headaches. Endo/Heme/Allergies: Negative for environmental allergies. Psychiatric/Behavioral: Negative for depression. The patient does not have insomnia. Physical Exam  Vitals signs and nursing note reviewed. Constitutional:       Appearance: Normal appearance. She is well-developed. Comments: /60 (BP 1 Location: Left arm, BP Patient Position: Sitting)   Pulse (!) 54   Temp 96.6 °F (35.9 °C) (Oral)   Resp 16   Ht 5' 2\" (1.575 m)   Wt 143 lb 12.8 oz (65.2 kg)   SpO2 99%   BMI 26.30 kg/m²    HENT:      Right Ear: Tympanic membrane and ear canal normal.      Left Ear: Tympanic membrane and ear canal normal.      Nose: No mucosal edema or rhinorrhea. Neck:      Musculoskeletal: Normal range of motion and neck supple. Thyroid: No thyromegaly. Cardiovascular:      Rate and Rhythm: Normal rate and regular rhythm. Heart sounds: Normal heart sounds. No gallop. Pulmonary:      Effort: Pulmonary effort is normal.      Breath sounds: Normal breath sounds. Abdominal:      General: Bowel sounds are normal.      Palpations: Abdomen is soft. There is no mass. Tenderness: There is no tenderness. Musculoskeletal: Normal range of motion. General: No swelling. Lymphadenopathy:      Cervical: No cervical adenopathy. Skin:     General: Skin is warm and dry.          ASSESSMENT and PLAN  Diagnoses and all orders for this visit:    1. Essential hypertension  Stable at goal.      2. Type 2 diabetes mellitus without complication, without long-term current use of insulin (HCC)  -     MICROALBUMIN, UR, RAND W/ MICROALB/CREAT RATIO  -     AMB POC URINALYSIS DIP STICK AUTO W/ MICRO  -     AMB POC HEMOGLOBIN A1C  -     AMB POC GLUCOSE, QUANTITATIVE, BLOOD    3. Mixed hyperlipidemia  -     LIPID PANEL    4. Chronic anemia  -     CBC W/O DIFF    5. Spinal stenosis of lumbar region without neurogenic claudication//  6. Primary osteoarthritis involving multiple joints  Stable     7. ELEAZAR (obstructive sleep apnea)  She is not using CPAP. Does not want to resume at this point. 8. Encounter for medication monitoring  -     METABOLIC PANEL, COMPREHENSIVE    9. Encounter for screening mammogram for breast cancer  -     [facility] MAMMO BI SCREENING INCL CAD; Future      Follow-up and Dispositions    · Return in about 4 months (around 4/13/2020). reviewed diet, exercise and weight control  cardiovascular risk and specific lipid/LDL goals reviewed  reviewed medications and side effects in detail  specific diabetic recommendations: low cholesterol diet, weight control and daily exercise discussed, all medications, side effects and compliance discussed carefully, foot care discussed and Podiatry visits discussed, annual eye examinations at Ophthalmology discussed and glycohemoglobin and other lab monitoring discussed     I have discussed diagnosis listed in this note with pt and/or family. I have discussed treatment plans and options and the risk/benefit analysis of those options, including safe use of medications and possible medication side effects. Through the use of shared decision making we have agreed to the above plan. The patient has received an after-visit summary and questions were answered concerning future plans and follow up. Advise pt of any urgent changes then to proceed to the ER.

## 2019-12-13 ENCOUNTER — OFFICE VISIT (OUTPATIENT)
Dept: FAMILY MEDICINE CLINIC | Age: 84
End: 2019-12-13

## 2019-12-13 ENCOUNTER — HOSPITAL ENCOUNTER (OUTPATIENT)
Dept: LAB | Age: 84
Discharge: HOME | End: 2019-12-13
Payer: MEDICARE

## 2019-12-13 VITALS
WEIGHT: 143.8 LBS | BODY MASS INDEX: 26.46 KG/M2 | TEMPERATURE: 96.6 F | DIASTOLIC BLOOD PRESSURE: 60 MMHG | SYSTOLIC BLOOD PRESSURE: 138 MMHG | HEART RATE: 54 BPM | RESPIRATION RATE: 16 BRPM | HEIGHT: 62 IN | OXYGEN SATURATION: 99 %

## 2019-12-13 DIAGNOSIS — M15.9 PRIMARY OSTEOARTHRITIS INVOLVING MULTIPLE JOINTS: ICD-10-CM

## 2019-12-13 DIAGNOSIS — Z12.31 ENCOUNTER FOR SCREENING MAMMOGRAM FOR BREAST CANCER: ICD-10-CM

## 2019-12-13 DIAGNOSIS — Z51.81 ENCOUNTER FOR MEDICATION MONITORING: ICD-10-CM

## 2019-12-13 DIAGNOSIS — G47.33 OSA (OBSTRUCTIVE SLEEP APNEA): ICD-10-CM

## 2019-12-13 DIAGNOSIS — M48.061 SPINAL STENOSIS OF LUMBAR REGION WITHOUT NEUROGENIC CLAUDICATION: ICD-10-CM

## 2019-12-13 DIAGNOSIS — I10 ESSENTIAL HYPERTENSION: Primary | ICD-10-CM

## 2019-12-13 DIAGNOSIS — E78.2 MIXED HYPERLIPIDEMIA: ICD-10-CM

## 2019-12-13 DIAGNOSIS — E11.9 TYPE 2 DIABETES MELLITUS WITHOUT COMPLICATION, WITHOUT LONG-TERM CURRENT USE OF INSULIN (HCC): ICD-10-CM

## 2019-12-13 DIAGNOSIS — D64.9 CHRONIC ANEMIA: ICD-10-CM

## 2019-12-13 LAB
BILIRUB UR QL STRIP: NEGATIVE
GLUCOSE POC: 147 MG/DL
GLUCOSE UR-MCNC: NEGATIVE MG/DL
HBA1C MFR BLD HPLC: 5.9 %
KETONES P FAST UR STRIP-MCNC: NEGATIVE MG/DL
PH UR STRIP: 6 [PH] (ref 4.6–8)
PROT UR QL STRIP: NORMAL
SP GR UR STRIP: 1.01 (ref 1–1.03)
UA UROBILINOGEN AMB POC: NORMAL (ref 0.2–1)
URINALYSIS CLARITY POC: CLEAR
URINALYSIS COLOR POC: YELLOW
URINE BLOOD POC: NEGATIVE
URINE LEUKOCYTES POC: NEGATIVE
URINE NITRITES POC: NEGATIVE

## 2019-12-13 PROCEDURE — 82043 UR ALBUMIN QUANTITATIVE: CPT

## 2019-12-13 PROCEDURE — 80061 LIPID PANEL: CPT

## 2019-12-13 PROCEDURE — 36415 COLL VENOUS BLD VENIPUNCTURE: CPT

## 2019-12-13 PROCEDURE — 85027 COMPLETE CBC AUTOMATED: CPT

## 2019-12-13 PROCEDURE — 80053 COMPREHEN METABOLIC PANEL: CPT

## 2019-12-13 NOTE — PROGRESS NOTES
Chief Complaint   Patient presents with    Diabetes     follow up    Hypertension     follow up       A1C 8/13/2019    Microalbumin 11/2/2018    Mammogram 12/12/2018    Bone density 5/19/2014    Colonoscopy 7/15/2019 by  [facility] and was told she did not need another unless she had a problem. Patient states last eye exam was 2/2019 by MD at Dr. Eladia Varela office. 1. Have you been to the ER, urgent care clinic since your last visit? Hospitalized since your last visit? no    2. Have you seen or consulted any other health care providers outside of the [address] [place] [address] since your last visit? Include any pap smears or colon screening.  no

## 2019-12-14 LAB
ALBUMIN SERPL-MCNC: 4.1 G/DL (ref 3.5–4.7)
ALBUMIN/CREAT UR: 198.9 MG/G CREAT (ref 0–30)
ALBUMIN/GLOB SERPL: 0.9 {RATIO} (ref 1.2–2.2)
ALP SERPL-CCNC: 49 IU/L (ref 39–117)
ALT SERPL-CCNC: 8 IU/L (ref 0–32)
AST SERPL-CCNC: 13 IU/L (ref 0–40)
BILIRUB SERPL-MCNC: 0.4 MG/DL (ref 0–1.2)
BUN SERPL-MCNC: 26 MG/DL (ref 8–27)
BUN/CREAT SERPL: 19 (ref 12–28)
CALCIUM SERPL-MCNC: 9.8 MG/DL (ref 8.7–10.3)
CHLORIDE SERPL-SCNC: 106 MMOL/L (ref 96–106)
CHOLEST SERPL-MCNC: 109 MG/DL (ref 100–199)
CO2 SERPL-SCNC: 20 MMOL/L (ref 20–29)
CREAT SERPL-MCNC: 1.37 MG/DL (ref 0.57–1)
CREAT UR-MCNC: 74.5 MG/DL
ERYTHROCYTE [DISTWIDTH] IN BLOOD BY AUTOMATED COUNT: 14.5 % (ref 12.3–15.4)
GLOBULIN SER CALC-MCNC: 4.6 G/DL (ref 1.5–4.5)
GLUCOSE SERPL-MCNC: 131 MG/DL (ref 65–99)
HCT VFR BLD AUTO: 32.8 % (ref 34–46.6)
HDLC SERPL-MCNC: 34 MG/DL
HGB BLD-MCNC: 10.6 G/DL (ref 11.1–15.9)
INTERPRETATION, 910389: NORMAL
INTERPRETATION: NORMAL
LDLC SERPL CALC-MCNC: 49 MG/DL (ref 0–99)
Lab: NORMAL
Lab: NORMAL
MCH RBC QN AUTO: 29.9 PG (ref 26.6–33)
MCHC RBC AUTO-ENTMCNC: 32.3 G/DL (ref 31.5–35.7)
MCV RBC AUTO: 92 FL (ref 79–97)
MICROALBUMIN UR-MCNC: 148.2 UG/ML
PDF IMAGE, 910387: NORMAL
PLATELET # BLD AUTO: 141 X10E3/UL (ref 150–450)
POTASSIUM SERPL-SCNC: 4.4 MMOL/L (ref 3.5–5.2)
PROT SERPL-MCNC: 8.7 G/DL (ref 6–8.5)
RBC # BLD AUTO: 3.55 X10E6/UL (ref 3.77–5.28)
SODIUM SERPL-SCNC: 138 MMOL/L (ref 134–144)
TRIGL SERPL-MCNC: 128 MG/DL (ref 0–149)
VLDLC SERPL CALC-MCNC: 26 MG/DL (ref 5–40)
WBC # BLD AUTO: 4.8 X10E3/UL (ref 3.4–10.8)

## 2019-12-24 ENCOUNTER — HOSPITAL ENCOUNTER (OUTPATIENT)
Dept: MAMMOGRAPHY | Age: 84
Discharge: HOME | End: 2019-12-24
Attending: FAMILY MEDICINE
Payer: MEDICARE

## 2019-12-24 DIAGNOSIS — Z12.31 ENCOUNTER FOR SCREENING MAMMOGRAM FOR BREAST CANCER: ICD-10-CM

## 2019-12-24 PROCEDURE — 77067 SCR MAMMO BI INCL CAD: CPT

## 2020-01-08 RX ORDER — AMLODIPINE BESYLATE 10 MG/1
TABLET ORAL
Qty: 90 TAB | Refills: 3 | Status: SHIPPED | OUTPATIENT
Start: 2020-01-08 | End: 2020-01-10 | Stop reason: SDUPTHER

## 2020-01-08 RX ORDER — METOPROLOL TARTRATE 100 MG/1
TABLET ORAL
Qty: 180 TAB | Refills: 3 | Status: SHIPPED | OUTPATIENT
Start: 2020-01-08 | End: 2020-01-10 | Stop reason: SDUPTHER

## 2020-01-08 RX ORDER — CLONIDINE HYDROCHLORIDE 0.3 MG/1
TABLET ORAL
Qty: 135 TAB | Refills: 3 | Status: SHIPPED | OUTPATIENT
Start: 2020-01-08 | End: 2020-01-10 | Stop reason: SDUPTHER

## 2020-01-08 RX ORDER — SPIRONOLACTONE 25 MG/1
TABLET ORAL
Qty: 180 TAB | Refills: 3 | Status: SHIPPED | OUTPATIENT
Start: 2020-01-08 | End: 2020-01-10 | Stop reason: SDUPTHER

## 2020-01-10 RX ORDER — AMLODIPINE BESYLATE 10 MG/1
10 TABLET ORAL DAILY
Qty: 14 TAB | Refills: 0 | Status: SHIPPED | OUTPATIENT
Start: 2020-01-10 | End: 2020-10-19

## 2020-01-10 RX ORDER — MINOXIDIL 10 MG/1
TABLET ORAL
Qty: 7 TAB | Refills: 0 | Status: SHIPPED | OUTPATIENT
Start: 2020-01-10 | End: 2020-01-30 | Stop reason: SDUPTHER

## 2020-01-10 RX ORDER — CLONIDINE HYDROCHLORIDE 0.3 MG/1
TABLET ORAL
Qty: 21 TAB | Refills: 0 | Status: SHIPPED | OUTPATIENT
Start: 2020-01-10 | End: 2020-10-19

## 2020-01-10 RX ORDER — SPIRONOLACTONE 25 MG/1
25 TABLET ORAL 2 TIMES DAILY
Qty: 28 TAB | Refills: 0 | Status: SHIPPED | OUTPATIENT
Start: 2020-01-10 | End: 2020-10-19

## 2020-01-10 RX ORDER — METOPROLOL TARTRATE 100 MG/1
100 TABLET ORAL 2 TIMES DAILY
Qty: 28 TAB | Refills: 0 | Status: SHIPPED | OUTPATIENT
Start: 2020-01-10 | End: 2020-10-19

## 2020-01-10 NOTE — TELEPHONE ENCOUNTER
----- Message from Arnie Charlotte sent at 1/10/2020  9:21 AM EST -----  Regarding: Dr. Mckenna Whalen  Contact: [phone]  Caller (if not patient): n/a  Relationship of caller (if not patient): n/a  Best contact number(s): [phone]  Name of medication and dosage if known: \"Minoxidil\" 10mg, \"spironolactone\" 25 mg \"Clomidine\" . 3 mg, \"Metoprolol\" 100 mg, and \"Amlodipine\" 10 mg  Is patient out of this medication (yes/no): Yes   Pharmacy name: [address] [place] [address] listed in chart? (yes/no): Yes  Pharmacy phone number: [phone]  Date of last visit: 12/13/19  Details to clarify the request: Pt just needs enough for two weeks until [facility] sends the rest of the medication.

## 2020-01-30 RX ORDER — MINOXIDIL 10 MG/1
TABLET ORAL
Qty: 45 TAB | Refills: 3 | Status: SHIPPED | OUTPATIENT
Start: 2020-01-30 | End: 2020-11-24

## 2020-01-30 NOTE — TELEPHONE ENCOUNTER
----- Message from Ludmila Renner sent at 1/30/2020  9:51 AM EST -----  Regarding: Dr. Pedro Adorno: [phone] (if not patient): N/A  Relationship of caller (if not patient):N/A  Best contact number(s): [phone]  Name of medication and dosage if known: \"meloxidyl\"  Is patient out of this medication (yes/no): yes  Pharmacy name: [facility] mail [facility]  Pharmacy listed in chart? (yes/no):yes  Pharmacy phone number :n/a  Date of last visit: 12/13/19  Details to clarify the request: request phone call once prescription is approved

## 2020-03-17 DIAGNOSIS — E78.2 MIXED HYPERLIPIDEMIA: Primary | ICD-10-CM

## 2020-03-17 RX ORDER — ROSUVASTATIN CALCIUM 5 MG/1
TABLET, COATED ORAL
Qty: 90 TAB | Refills: 3 | Status: SHIPPED | OUTPATIENT
Start: 2020-03-17 | End: 2021-02-17

## 2020-03-17 NOTE — TELEPHONE ENCOUNTER
----- Message from Addie Carter sent at 3/17/2020 10:38 AM EDT -----  Regarding: Dr. Alton Rodríguez / Dinah Downing contact number(s): [phone]  Name of medication and dosage if known: Rosuvastatin - 5 mg  Is patient out of this medication (yes/no): Yes   Pharmacy name: [facility] [address] listed in chart? (yes/no):  Unknown  Pharmacy phone number [phone]  Date of last visit: December 13, 2019  Details to clarify the request:

## 2020-07-17 RX ORDER — ALLOPURINOL 100 MG/1
TABLET ORAL
Qty: 90 TAB | Refills: 3 | Status: SHIPPED | OUTPATIENT
Start: 2020-07-17 | End: 2020-11-11 | Stop reason: SDUPTHER

## 2020-07-17 RX ORDER — CHLORTHALIDONE 25 MG/1
TABLET ORAL
Qty: 180 TAB | Refills: 3 | Status: SHIPPED | OUTPATIENT
Start: 2020-07-17 | End: 2021-07-19

## 2020-09-08 ENCOUNTER — TELEPHONE (OUTPATIENT)
Dept: FAMILY MEDICINE CLINIC | Age: 85
End: 2020-09-08

## 2020-09-08 NOTE — TELEPHONE ENCOUNTER
----- Message from Jennifer Jenkins sent at 9/8/2020 11:51 AM EDT -----  Regarding: Dr. Maria M Patel/ telephone  Caller's first and last name and relationship to patient (if not the patient): pt.   Best contact number: [phone]  Preferred date and time: next available   Scheduled appointment date and time: n.a   Reason for appointment: following up with PCP after patient first visit for ear infection.    Details to clarify the request: [facility] or 8/24/20

## 2020-09-28 ENCOUNTER — HOSPITAL ENCOUNTER (OUTPATIENT)
Dept: GENERAL RADIOLOGY | Age: 85
Discharge: HOME | End: 2020-09-28
Attending: INTERNAL MEDICINE
Payer: MEDICARE

## 2020-09-28 DIAGNOSIS — C90.00 MYELOMA ASSOCIATED AMYLOIDOSIS (HCC): ICD-10-CM

## 2020-09-28 DIAGNOSIS — E85.9 MYELOMA ASSOCIATED AMYLOIDOSIS (HCC): ICD-10-CM

## 2020-09-28 PROCEDURE — 77075 RADEX OSSEOUS SURVEY COMPL: CPT

## 2020-10-19 RX ORDER — SPIRONOLACTONE 25 MG/1
TABLET ORAL
Qty: 180 TAB | Refills: 3 | Status: SHIPPED | OUTPATIENT
Start: 2020-10-19 | End: 2021-09-27

## 2020-10-19 RX ORDER — METOPROLOL TARTRATE 100 MG/1
TABLET ORAL
Qty: 180 TAB | Refills: 3 | Status: SHIPPED | OUTPATIENT
Start: 2020-10-19 | End: 2021-09-27

## 2020-10-19 RX ORDER — CLONIDINE HYDROCHLORIDE 0.3 MG/1
TABLET ORAL
Qty: 135 TAB | Refills: 3 | Status: SHIPPED | OUTPATIENT
Start: 2020-10-19 | End: 2021-04-28 | Stop reason: ALTCHOICE

## 2020-10-19 RX ORDER — AMLODIPINE BESYLATE 10 MG/1
TABLET ORAL
Qty: 90 TAB | Refills: 3 | Status: SHIPPED | OUTPATIENT
Start: 2020-10-19 | End: 2021-09-27

## 2020-10-27 ENCOUNTER — OFFICE VISIT (OUTPATIENT)
Dept: FAMILY MEDICINE CLINIC | Age: 85
End: 2020-10-27
Payer: MEDICARE

## 2020-10-27 ENCOUNTER — HOSPITAL ENCOUNTER (OUTPATIENT)
Dept: LAB | Age: 85
Discharge: HOME | End: 2020-10-27
Payer: MEDICARE

## 2020-10-27 VITALS
OXYGEN SATURATION: 97 % | SYSTOLIC BLOOD PRESSURE: 138 MMHG | TEMPERATURE: 96.9 F | RESPIRATION RATE: 18 BRPM | HEART RATE: 56 BPM | DIASTOLIC BLOOD PRESSURE: 55 MMHG | WEIGHT: 134.4 LBS | HEIGHT: 62 IN | BODY MASS INDEX: 24.73 KG/M2

## 2020-10-27 DIAGNOSIS — D64.9 CHRONIC ANEMIA: ICD-10-CM

## 2020-10-27 DIAGNOSIS — E78.2 MIXED HYPERLIPIDEMIA: ICD-10-CM

## 2020-10-27 DIAGNOSIS — Z51.81 ENCOUNTER FOR MEDICATION MONITORING: ICD-10-CM

## 2020-10-27 DIAGNOSIS — I10 ESSENTIAL HYPERTENSION: Primary | ICD-10-CM

## 2020-10-27 DIAGNOSIS — E11.9 TYPE 2 DIABETES MELLITUS WITHOUT COMPLICATION, WITHOUT LONG-TERM CURRENT USE OF INSULIN (HCC): ICD-10-CM

## 2020-10-27 DIAGNOSIS — M15.9 PRIMARY OSTEOARTHRITIS INVOLVING MULTIPLE JOINTS: ICD-10-CM

## 2020-10-27 DIAGNOSIS — Z12.31 ENCOUNTER FOR SCREENING MAMMOGRAM FOR BREAST CANCER: ICD-10-CM

## 2020-10-27 DIAGNOSIS — G47.33 OSA (OBSTRUCTIVE SLEEP APNEA): ICD-10-CM

## 2020-10-27 PROCEDURE — G8510 SCR DEP NEG, NO PLAN REQD: HCPCS | Performed by: FAMILY MEDICINE

## 2020-10-27 PROCEDURE — 99214 OFFICE O/P EST MOD 30 MIN: CPT | Performed by: FAMILY MEDICINE

## 2020-10-27 PROCEDURE — G8754 DIAS BP LESS 90: HCPCS | Performed by: FAMILY MEDICINE

## 2020-10-27 PROCEDURE — 85027 COMPLETE CBC AUTOMATED: CPT

## 2020-10-27 PROCEDURE — 80053 COMPREHEN METABOLIC PANEL: CPT

## 2020-10-27 PROCEDURE — G8752 SYS BP LESS 140: HCPCS | Performed by: FAMILY MEDICINE

## 2020-10-27 PROCEDURE — 36415 COLL VENOUS BLD VENIPUNCTURE: CPT

## 2020-10-27 PROCEDURE — G0463 HOSPITAL OUTPT CLINIC VISIT: HCPCS | Performed by: FAMILY MEDICINE

## 2020-10-27 PROCEDURE — G8399 PT W/DXA RESULTS DOCUMENT: HCPCS | Performed by: FAMILY MEDICINE

## 2020-10-27 PROCEDURE — 80061 LIPID PANEL: CPT

## 2020-10-27 PROCEDURE — G8536 NO DOC ELDER MAL SCRN: HCPCS | Performed by: FAMILY MEDICINE

## 2020-10-27 PROCEDURE — 1090F PRES/ABSN URINE INCON ASSESS: CPT | Performed by: FAMILY MEDICINE

## 2020-10-27 PROCEDURE — 1101F PT FALLS ASSESS-DOCD LE1/YR: CPT | Performed by: FAMILY MEDICINE

## 2020-10-27 PROCEDURE — G8420 CALC BMI NORM PARAMETERS: HCPCS | Performed by: FAMILY MEDICINE

## 2020-10-27 PROCEDURE — G8427 DOCREV CUR MEDS BY ELIG CLIN: HCPCS | Performed by: FAMILY MEDICINE

## 2020-10-27 PROCEDURE — 83036 HEMOGLOBIN GLYCOSYLATED A1C: CPT

## 2020-10-27 NOTE — PROGRESS NOTES
HISTORY OF PRESENT ILLNESS  Krysta Godwin is a 80 y.o. female. HPI   Follow up on chronic medical problems. Overall feeling well. Doing the precautionary measures at home to reduce risks of exposure COVID19. Also wearing mask when she is going out. No known sick contacts or known exposure to [address]. Cardiovascular Review:  The patient has hypertension, ELEAZAR (but has not been using the CPAP) and hyperlipidemia. Denies chest pain or chest tightness. SOB is at her usual.  No cough or congestion noted. Diet and Lifestyle: generally follows a low fat low cholesterol diet, generally follows a low sodium diet, follows a diabetic diet regularly  Home BP Monitoring: is well controlled at home, ranging 130s's/70-80's. Pertinent ROS: taking medications as instructed, no medication side effects noted, no TIA's, no chest pain on exertion, no swelling of ankles. Diabetes Mellitus:  She has diabetes mellitus. Diabetic ROS - diabetic diet compliance: compliant most of the time, home glucose monitoring: is performed regularly, fasting values range low 100s,  Pt does not have BS log with her today, further diabetic ROS: no polyuria or polydipsia, no chest pain, dyspnea or TIA's, no numbness, tingling or pain in extremities, no unusual visual symptoms, no hypoglycemia. Weight is down by 9 pounds since last seen last December but states that her appetite is good. Just has not been cooking as much. Lab review: orders written for new lab studies as appropriate; see orders. Chronic anemia and multiple myeloma in remission is being followed by hematology. Has appt today with hematology. Osteoarthritis:  Patient has osteoarthritis. She also had know spinal stenosis. She has been having pains in several joints that migrate and comes and goes. Overall the pain in the joints and lower back has been doing good with minimal pain. Symptoms onset: problem is longstanding.   Rheumatological ROS: stable, mild-to-moderate joint symptoms intermittently, reasonably well controlled by PRN meds. Response to treatment plan: stable and intermittent. 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    Essential hypertension, malignant I10    Mixed hyperlipidemia E78.2    ELEAZAR on CPAP G47.33, Z99.89    Encounter for medication monitoring Z51.81    Type 2 diabetes mellitus without complication (HCC) G06.5    Type 2 diabetes with nephropathy (HCC) E11.21    WALKER (dyspnea on exertion) R06.00       Current Outpatient Medications   Medication Sig Dispense Refill    spironolactone (ALDACTONE) 25 mg tablet TAKE 1 TABLET BY MOUTH TWO  TIMES DAILY 180 Tab 3    metoprolol tartrate (LOPRESSOR) 100 mg IR tablet TAKE 1 TABLET BY MOUTH  TWICE A  Tab 3    amLODIPine (NORVASC) 10 mg tablet TAKE 1 TABLET BY MOUTH ONCE A DAY 90 Tab 3    cloNIDine HCL (CATAPRES) 0.3 mg tablet TAKE ONE-HALF TABLET BY  MOUTH DAILY AFTER LUNCH AND TAKE 1 TABLET EVERY NIGHT  AT BEDTIME 135 Tab 3    chlorthalidone (HYGROTEN) 25 mg tablet TAKE 1 TABLET BY MOUTH TWO  TIMES DAILY 180 Tab 3    allopurinoL (ZYLOPRIM) 100 mg tablet TAKE 1 TABLET BY MOUTH  EVERY DAY 90 Tab 3    rosuvastatin (CRESTOR) 5 mg tablet TAKE ONE TABLET BY MOUTH  NIGHTLY. 90 Tab 3    minoxidil (LONITEN) 10 mg tablet Take 1/2 tablet daily 45 Tab 3    metFORMIN ER (GLUCOPHAGE XR) 500 mg tablet TAKE 1 TABLET BY MOUTH  DAILY WITH DINNER 90 Tab 3    mupirocin calcium (BACTROBAN) 2 % topical cream Apply  to affected area two (2) times daily as needed (skin irritation). 30 g 1    ascorbate calcium 500 mg tab Take 500 mg by mouth daily.  meclizine (ANTIVERT) 12.5 mg tablet Take 1 Tab by mouth three (3) times daily as needed. Take as needed for dizziness.  30 Tab 0    ondansetron (ZOFRAN ODT) 4 mg disintegrating tablet Take 4 mg by mouth every eight (8) hours as needed for Nausea.  fluticasone (FLONASE) 50 mcg/actuation nasal spray 2 Sprays by Both Nostrils route daily as needed.  polyethylene glycol (MIRALAX) 17 gram packet Take 17 g by mouth daily as needed.  cyanocobalamin (VITAMIN B-12) 100 mcg tablet Take 100 mcg by mouth daily.  EPOETIN BRYAN (PROCRIT IJ) 1,000 Units by Injection route every fourteen (14) days. Depending on blood count       omega-3 fatty acids-vitamin e (FISH OIL) 1,000 mg Cap Take 1 Tab by mouth two (2) times a day.  cholecalciferol, vitamin d3, (VITAMIN D) 1,000 unit tablet Take 1,000 Units by mouth daily. Allergies   Allergen Reactions    Pcn [Penicillins] Swelling     tongue    Bactrim [Sulfamethoxazole-Trimethoprim] Diarrhea    Ceftin [Cefuroxime Axetil] Diarrhea    Hydralazine Other (comments)     edema    Sulfa (Sulfonamide Antibiotics) Itching         Past Medical History:   Diagnosis Date    Anemia NEC     Benign neoplasm of adrenal gland     Chronic anemia 6/1/2010    Constipation     on a daily stool softener which pt states helps as of 4/27/16    Diabetes ([address]     WALKER (dyspnea on exertion)     as of 4/27/16 pt states this is her baseline and is not getting any worse    GERD (gastroesophageal reflux disease)     Goiter 2011    as of 4/27/16 Pt states \"I am not even sure I have it\".   Pt denies any problems    Gout 6/1/2010    as of 4/27/16 pt denies any sx    HTN (hypertension) 6/1/2010    followed by cardiology Dr Desir Anthony  [address] [facility] Multiple myeloma, without mention of having achieved remission Dx 11/1/99    followed by Job Castillo    Pure hypercholesterolemia 6/1/2010    Sleep apnea 6/1/2010    no CPAP    Spinal stenosis 6/1/2010         Past Surgical History:   Procedure Laterality Date    COLONOSCOPY N/A 7/15/2019    flex sig , polypectomy performed by Yessi Webb MD at [facility]"" AMBULATORY OR    HX HYSTERECTOMY  1970s    HX ORTHOPAEDIC  2005    left hand-trigger finger    HX ORTHOPAEDIC  2007    right hand- trigger finger    UT COLONOSCOPY FLX DX W/COLLJ SPEC WHEN PFRMD  08/10/2010    [facility]         Family History   Problem Relation Age of Onset    Hypertension Mother     No Known Problems Father     Cancer Sister 52        colon     Diabetes Sister     Kidney Disease Sister     Hypertension Sister     Elevated Lipids Sister        Social History     Tobacco Use    Smoking status: Former Smoker     Last attempt to quit: 1980     Years since quittin.8    Smokeless tobacco: Never Used   Substance Use Topics    Alcohol use: No     Alcohol/week: 0.0 standard drinks        Lab Results   Component Value Date/Time    WBC 4.8 2019 10:46 AM    HGB 10.6 (L) 2019 10:46 AM    HCT 32.8 (L) 2019 10:46 AM    PLATELET 482 (L)  10:46 AM    MCV 92 2019 10:46 AM     Lab Results   Component Value Date/Time    Cholesterol, total 109 2019 10:46 AM    HDL Cholesterol 34 (L) 2019 10:46 AM    LDL, calculated 49 2019 10:46 AM    LDL-C, External 57 10/14/2015    Triglyceride 128 2019 10:46 AM    CHOL/HDL Ratio 2.8 2010 09:57 AM     Lab Results   Component Value Date/Time    TSH 2.860 2011 09:29 AM      Lab Results   Component Value Date/Time    Sodium 138 2019 10:46 AM    Potassium 4.4 2019 10:46 AM    Chloride 106 2019 10:46 AM    CO2 20 2019 10:46 AM    Anion gap 9 2010 04:04 PM    Glucose 131 (H) 2019 10:46 AM    BUN 26 2019 10:46 AM    Creatinine 1.37 (H) 2019 10:46 AM    BUN/Creatinine ratio 19 2019 10:46 AM    GFR est AA 41 (L) 2019 10:46 AM    GFR est non-AA 35 (L) 2019 10:46 AM    Calcium 9.8 2019 10:46 AM    Bilirubin, total 0.4 2019 10:46 AM    ALT (SGPT) 8 2019 10:46 AM    Alk.  phosphatase 49 2019 10:46 AM    Protein, total 8.7 (H) 12/13/2019 10:46 AM    Albumin 4.1 12/13/2019 10:46 AM    Globulin 5.6 (H) 06/02/2010 09:57 AM    A-G Ratio 0.9 (L) 12/13/2019 10:46 AM      Lab Results   Component Value Date/Time    Hemoglobin A1c 7.4 (H) 10/28/2016 10:49 AM    Hemoglobin A1c (POC) 5.9 12/13/2019 08:40 AM    Hemoglobin A1c, External 6.4 10/14/2015         Review of Systems   Constitutional: Negative for malaise/fatigue. HENT: Negative for congestion. Eyes: Negative for blurred vision. Respiratory: Negative for cough and shortness of breath. Cardiovascular: Negative for chest pain, palpitations and leg swelling. Gastrointestinal: Negative for abdominal pain, constipation and heartburn. Genitourinary: Negative for dysuria, frequency and urgency. Neurological: Negative for dizziness, tingling and headaches. Endo/Heme/Allergies: Negative for environmental allergies. Psychiatric/Behavioral: Negative for depression. The patient does not have insomnia. Physical Exam  Vitals signs and nursing note reviewed. Constitutional:       Appearance: Normal appearance. She is well-developed. Comments: BP (!) 138/55 (BP 1 Location: Left arm, BP Patient Position: Sitting)   Pulse (!) 56   Temp 96.9 °F (36.1 °C) (Temporal)   Resp 18   Ht 5' 2\" (1.575 m)   Wt 134 lb 6.4 oz (61 kg)   SpO2 97%   BMI 24.58 kg/m²      HENT:      Right Ear: Tympanic membrane and ear canal normal.      Left Ear: Tympanic membrane and ear canal normal.      Nose: No mucosal edema or rhinorrhea. Neck:      Musculoskeletal: Normal range of motion and neck supple. Thyroid: No thyromegaly. Cardiovascular:      Rate and Rhythm: Normal rate and regular rhythm. Heart sounds: Normal heart sounds. No gallop. Pulmonary:      Effort: Pulmonary effort is normal.      Breath sounds: Normal breath sounds. Abdominal:      General: Bowel sounds are normal.      Palpations: Abdomen is soft. There is no mass. Tenderness:  There is no abdominal tenderness. Musculoskeletal: Normal range of motion. General: No swelling. Lymphadenopathy:      Cervical: No cervical adenopathy. Skin:     General: Skin is warm and dry. ASSESSMENT and PLAN  Diagnoses and all orders for this visit:    1. Essential hypertension  Discussed sodium restriction, high k rich diet, maintaining ideal body weight and regular exercise program such as daily walking 30 min perday 4-5 times per week, as physiologic means to achieve blood pressure control.  Medication compliance advised. 2. Type 2 diabetes mellitus without complication, without long-term current use of insulin (HCC)  -     HEMOGLOBIN A1C WITH EAG; Future    3. Mixed hyperlipidemia  -     LIPID PANEL; Future    4. Chronic anemia  -     CBC W/O DIFF; Future    5. Primary osteoarthritis involving multiple joints  Stable     6. ELEAZAR (obstructive sleep apnea)  Stable off of the CPAP. She does not want follow up with sleep specialist at this time. 7. Encounter for medication monitoring  -     METABOLIC PANEL, COMPREHENSIVE; Future    8. Encounter for screening mammogram for breast cancer  -     [facility] MAMMO BI SCREENING INCL CAD; Future      Follow-up and Dispositions    · Return in about 6 months (around 4/27/2021) for medicare wellness exam.       current treatment plan is effective, no change in therapy  reviewed diet, exercise and weight control  cardiovascular risk and specific lipid/LDL goals reviewed  reviewed medications and side effects in detail  specific diabetic recommendations: low cholesterol diet, weight control and daily exercise discussed, all medications, side effects and compliance discussed carefully, foot care discussed and Podiatry visits discussed, annual eye examinations at Ophthalmology discussed and glycohemoglobin and other lab monitoring discussed     I have discussed diagnosis listed in this note with pt and/or family.  I have discussed treatment plans and options and the risk/benefit analysis of those options, including safe use of medications and possible medication side effects. Through the use of shared decision making we have agreed to the above plan. The patient has received an after-visit summary and questions were answered concerning future plans and follow up. Advise pt of any urgent changes then to proceed to the ER.

## 2020-10-27 NOTE — PROGRESS NOTES
Chief Complaint   Patient presents with    Diabetes     follow up    Hypertension     follow up    Cholesterol Problem     follow up         A1C 12/13/2019    Microalbumin 12/13/2019    Mammogram 12/24/2019    Bone density 5/19/2014    Colonoscopy 7/15/2019 by  Dorcus [facility] and was told she did not need another unless she had a problem. Patient states last eye exam was 2/2019 by MD at Dr. Luiza Lopez office. Patient states she will call to schedule an appt. 1. Have you been to the ER, urgent care clinic since your last visit? Hospitalized since your last visit? no    2. Have you seen or consulted any other health care providers outside of the [address] [place] [address] since your last visit? Include any pap smears or colon screening.  no

## 2020-10-28 LAB
ALBUMIN SERPL-MCNC: 4.3 G/DL (ref 3.6–4.6)
ALBUMIN/GLOB SERPL: 1 {RATIO} (ref 1.2–2.2)
ALP SERPL-CCNC: 56 IU/L (ref 39–117)
ALT SERPL-CCNC: 10 IU/L (ref 0–32)
AST SERPL-CCNC: 9 IU/L (ref 0–40)
BILIRUB SERPL-MCNC: 0.5 MG/DL (ref 0–1.2)
BUN SERPL-MCNC: 33 MG/DL (ref 8–27)
BUN/CREAT SERPL: 23 (ref 12–28)
CALCIUM SERPL-MCNC: 9.9 MG/DL (ref 8.7–10.3)
CHLORIDE SERPL-SCNC: 108 MMOL/L (ref 96–106)
CHOLEST SERPL-MCNC: 120 MG/DL (ref 100–199)
CO2 SERPL-SCNC: 20 MMOL/L (ref 20–29)
CREAT SERPL-MCNC: 1.45 MG/DL (ref 0.57–1)
ERYTHROCYTE [DISTWIDTH] IN BLOOD BY AUTOMATED COUNT: 16 % (ref 11.7–15.4)
EST. AVERAGE GLUCOSE BLD GHB EST-MCNC: 131 MG/DL
GLOBULIN SER CALC-MCNC: 4.5 G/DL (ref 1.5–4.5)
GLUCOSE SERPL-MCNC: 100 MG/DL (ref 65–99)
HBA1C MFR BLD: 6.2 % (ref 4.8–5.6)
HCT VFR BLD AUTO: 32.5 % (ref 34–46.6)
HDLC SERPL-MCNC: 35 MG/DL
HGB BLD-MCNC: 10.4 G/DL (ref 11.1–15.9)
INTERPRETATION, 910389: NORMAL
INTERPRETATION: NORMAL
LDLC SERPL CALC-MCNC: 67 MG/DL (ref 0–99)
Lab: NORMAL
MCH RBC QN AUTO: 29.5 PG (ref 26.6–33)
MCHC RBC AUTO-ENTMCNC: 32 G/DL (ref 31.5–35.7)
MCV RBC AUTO: 92 FL (ref 79–97)
PDF IMAGE, 910387: NORMAL
PLATELET # BLD AUTO: 132 X10E3/UL (ref 150–450)
POTASSIUM SERPL-SCNC: 4.6 MMOL/L (ref 3.5–5.2)
PROT SERPL-MCNC: 8.8 G/DL (ref 6–8.5)
RBC # BLD AUTO: 3.53 X10E6/UL (ref 3.77–5.28)
SODIUM SERPL-SCNC: 139 MMOL/L (ref 134–144)
TRIGL SERPL-MCNC: 96 MG/DL (ref 0–149)
VLDLC SERPL CALC-MCNC: 18 MG/DL (ref 5–40)
WBC # BLD AUTO: 5.8 X10E3/UL (ref 3.4–10.8)

## 2020-11-11 RX ORDER — ALLOPURINOL 100 MG/1
100 TABLET ORAL DAILY
Qty: 90 TAB | Refills: 3 | Status: SHIPPED | OUTPATIENT
Start: 2020-11-11 | End: 2021-07-19

## 2020-11-24 RX ORDER — MINOXIDIL 10 MG/1
TABLET ORAL
Qty: 45 TAB | Refills: 3 | Status: SHIPPED | OUTPATIENT
Start: 2020-11-24 | End: 2021-02-10 | Stop reason: SDUPTHER

## 2020-11-24 RX ORDER — METFORMIN HYDROCHLORIDE 500 MG/1
TABLET, EXTENDED RELEASE ORAL
Qty: 90 TAB | Refills: 3 | Status: SHIPPED | OUTPATIENT
Start: 2020-11-24 | End: 2021-02-10 | Stop reason: SDUPTHER

## 2020-12-29 ENCOUNTER — HOSPITAL ENCOUNTER (OUTPATIENT)
Dept: MAMMOGRAPHY | Age: 85
Discharge: HOME | End: 2020-12-29
Attending: FAMILY MEDICINE
Payer: MEDICARE

## 2020-12-29 DIAGNOSIS — Z12.31 ENCOUNTER FOR SCREENING MAMMOGRAM FOR BREAST CANCER: ICD-10-CM

## 2020-12-29 PROCEDURE — 77067 SCR MAMMO BI INCL CAD: CPT

## 2021-02-02 ENCOUNTER — TELEPHONE (OUTPATIENT)
Dept: FAMILY MEDICINE CLINIC | Age: 86
End: 2021-02-02

## 2021-02-02 NOTE — TELEPHONE ENCOUNTER
Advised patient ok to receive Covid Vaccine unless has had an anaphylactic reaction to a previous vaccine, medication, or food. Office put her name on [employer] but advised patient to get vaccine is offered.

## 2021-02-02 NOTE — TELEPHONE ENCOUNTER
----- Message from Konstantin Curiel sent at 2/2/2021 12:26 PM EST -----  Regarding: Dr. Ana Ca Message/Vendor Calls    Caller's first and last name: pt      Reason for call: Question      Callback required yes/no and why: yes, to confirm      Best contact number(s): [phone]        Details to clarify the request: pt would like to know if she should get the Covid-19 Vaccine because of all the medication she is taking?       Konstantin Cuirel

## 2021-02-10 ENCOUNTER — TELEPHONE (OUTPATIENT)
Dept: FAMILY MEDICINE CLINIC | Age: 86
End: 2021-02-10

## 2021-02-10 RX ORDER — MINOXIDIL 10 MG/1
TABLET ORAL
Qty: 45 TAB | Refills: 3 | Status: SHIPPED | OUTPATIENT
Start: 2021-02-10 | End: 2021-04-28 | Stop reason: SDUPTHER

## 2021-02-10 RX ORDER — METFORMIN HYDROCHLORIDE 500 MG/1
500 TABLET, EXTENDED RELEASE ORAL
Qty: 90 TAB | Refills: 3 | Status: SHIPPED | OUTPATIENT
Start: 2021-02-10 | End: 2021-04-28 | Stop reason: SDUPTHER

## 2021-02-10 NOTE — TELEPHONE ENCOUNTER
----- Message from Yared Marx sent at 2/10/2021 10:45 AM EST -----  Regarding: Dr. Master Mendieta / Angela Hoover (if not patient): N/A      Relationship of caller (if not patient): N/A      Best contact number(s): [phone]      Name of medication and dosage if known: \"Metformin\" 500 mg  \"Minoxidil\" 10 mg       Is patient out of this medication (yes/no): no, to the first, only 6 left; yes, to the second. Pharmacy name: [address] listed in chart? (yes/no): Yes   Pharmacy phone number: In chart      Details to clarify the request: Pt informed this usually a 90 day supply.       Yared Marx

## 2021-02-10 NOTE — TELEPHONE ENCOUNTER
----- Message from Emily Howell sent at 2/10/2021  2:13 PM EST -----  Regarding: Yoandy Adams MD/telephone  Patient return call    Caller's first and last name and relationship (if not the patient):      Best contact number(s): [phone]       Whose call is being returned: office       Details to clarify the request: Patient returned a call she received from the office she stated she received a call twice and both times the phone hung up when she answered she would like a call back to what the call is regarding.        April [address] [facility]

## 2021-02-17 DIAGNOSIS — E78.2 MIXED HYPERLIPIDEMIA: ICD-10-CM

## 2021-02-17 RX ORDER — ROSUVASTATIN CALCIUM 5 MG/1
TABLET, COATED ORAL
Qty: 90 TAB | Refills: 3 | Status: SHIPPED | OUTPATIENT
Start: 2021-02-17 | End: 2021-12-14

## 2021-04-28 ENCOUNTER — OFFICE VISIT (OUTPATIENT)
Dept: FAMILY MEDICINE CLINIC | Age: 86
End: 2021-04-28
Payer: MEDICARE

## 2021-04-28 VITALS
DIASTOLIC BLOOD PRESSURE: 52 MMHG | TEMPERATURE: 97.3 F | WEIGHT: 138.2 LBS | SYSTOLIC BLOOD PRESSURE: 138 MMHG | OXYGEN SATURATION: 100 % | HEART RATE: 50 BPM | HEIGHT: 62 IN | RESPIRATION RATE: 14 BRPM | BODY MASS INDEX: 25.43 KG/M2

## 2021-04-28 DIAGNOSIS — E78.2 MIXED HYPERLIPIDEMIA: ICD-10-CM

## 2021-04-28 DIAGNOSIS — M48.061 SPINAL STENOSIS OF LUMBAR REGION WITHOUT NEUROGENIC CLAUDICATION: ICD-10-CM

## 2021-04-28 DIAGNOSIS — G47.33 OSA (OBSTRUCTIVE SLEEP APNEA): ICD-10-CM

## 2021-04-28 DIAGNOSIS — I10 ESSENTIAL HYPERTENSION: ICD-10-CM

## 2021-04-28 DIAGNOSIS — E11.9 TYPE 2 DIABETES MELLITUS WITHOUT COMPLICATION, WITHOUT LONG-TERM CURRENT USE OF INSULIN (HCC): ICD-10-CM

## 2021-04-28 DIAGNOSIS — Z51.81 ENCOUNTER FOR MEDICATION MONITORING: ICD-10-CM

## 2021-04-28 DIAGNOSIS — Z00.00 MEDICARE ANNUAL WELLNESS VISIT, SUBSEQUENT: Primary | ICD-10-CM

## 2021-04-28 DIAGNOSIS — M15.9 PRIMARY OSTEOARTHRITIS INVOLVING MULTIPLE JOINTS: ICD-10-CM

## 2021-04-28 DIAGNOSIS — D64.9 CHRONIC ANEMIA: ICD-10-CM

## 2021-04-28 LAB
ALBUMIN SERPL-MCNC: 3.6 G/DL (ref 3.5–5)
ALBUMIN/GLOB SERPL: 0.7 {RATIO} (ref 1.1–2.2)
ALP SERPL-CCNC: 56 U/L (ref 45–117)
ALT SERPL-CCNC: 15 U/L (ref 12–78)
ANION GAP SERPL CALC-SCNC: 4 MMOL/L (ref 5–15)
AST SERPL-CCNC: 8 U/L (ref 15–37)
BILIRUB SERPL-MCNC: 0.3 MG/DL (ref 0.2–1)
BILIRUB UR QL STRIP: NEGATIVE
BUN SERPL-MCNC: 30 MG/DL (ref 6–20)
BUN/CREAT SERPL: 22 (ref 12–20)
CALCIUM SERPL-MCNC: 9.4 MG/DL (ref 8.5–10.1)
CHLORIDE SERPL-SCNC: 116 MMOL/L (ref 97–108)
CHOLEST SERPL-MCNC: 115 MG/DL
CO2 SERPL-SCNC: 20 MMOL/L (ref 21–32)
CREAT SERPL-MCNC: 1.36 MG/DL (ref 0.55–1.02)
CREAT UR-MCNC: 120 MG/DL
ERYTHROCYTE [DISTWIDTH] IN BLOOD BY AUTOMATED COUNT: 15.9 % (ref 11.5–14.5)
GLOBULIN SER CALC-MCNC: 5 G/DL (ref 2–4)
GLUCOSE POC: 130 MG/DL
GLUCOSE SERPL-MCNC: 126 MG/DL (ref 65–100)
GLUCOSE UR-MCNC: NEGATIVE MG/DL
HBA1C MFR BLD HPLC: 6 %
HCT VFR BLD AUTO: 29.5 % (ref 35–47)
HDLC SERPL-MCNC: 41 MG/DL
HDLC SERPL: 2.8 {RATIO} (ref 0–5)
HEMOCCULT STL QL: NEGATIVE
HGB BLD-MCNC: 9.2 G/DL (ref 11.5–16)
KETONES P FAST UR STRIP-MCNC: NEGATIVE MG/DL
LDLC SERPL CALC-MCNC: 53.6 MG/DL (ref 0–100)
LIPID PROFILE,FLP: NORMAL
MCH RBC QN AUTO: 29.5 PG (ref 26–34)
MCHC RBC AUTO-ENTMCNC: 31.2 G/DL (ref 30–36.5)
MCV RBC AUTO: 94.6 FL (ref 80–99)
MICROALBUMIN UR-MCNC: 12.2 MG/DL
MICROALBUMIN/CREAT UR-RTO: 102 MG/G (ref 0–30)
NRBC # BLD: 0 K/UL (ref 0–0.01)
NRBC BLD-RTO: 0 PER 100 WBC
PH UR STRIP: 5.5 [PH] (ref 4.6–8)
PLATELET # BLD AUTO: 221 K/UL (ref 150–400)
PMV BLD AUTO: 10.9 FL (ref 8.9–12.9)
POTASSIUM SERPL-SCNC: 4.6 MMOL/L (ref 3.5–5.1)
PROT SERPL-MCNC: 8.6 G/DL (ref 6.4–8.2)
PROT UR QL STRIP: NORMAL
RBC # BLD AUTO: 3.12 M/UL (ref 3.8–5.2)
SODIUM SERPL-SCNC: 140 MMOL/L (ref 136–145)
SP GR UR STRIP: 1.02 (ref 1–1.03)
TRIGL SERPL-MCNC: 102 MG/DL (ref ?–150)
UA UROBILINOGEN AMB POC: NORMAL (ref 0.2–1)
URINALYSIS CLARITY POC: CLEAR
URINALYSIS COLOR POC: YELLOW
URINE BLOOD POC: NEGATIVE
URINE LEUKOCYTES POC: NEGATIVE
URINE NITRITES POC: NEGATIVE
VALID INTERNAL CONTROL?: YES
VLDLC SERPL CALC-MCNC: 20.4 MG/DL
WBC # BLD AUTO: 4.6 K/UL (ref 3.6–11)

## 2021-04-28 PROCEDURE — 83036 HEMOGLOBIN GLYCOSYLATED A1C: CPT | Performed by: FAMILY MEDICINE

## 2021-04-28 PROCEDURE — 1101F PT FALLS ASSESS-DOCD LE1/YR: CPT | Performed by: FAMILY MEDICINE

## 2021-04-28 PROCEDURE — G0444 DEPRESSION SCREEN ANNUAL: HCPCS | Performed by: FAMILY MEDICINE

## 2021-04-28 PROCEDURE — 99214 OFFICE O/P EST MOD 30 MIN: CPT | Performed by: FAMILY MEDICINE

## 2021-04-28 PROCEDURE — G8427 DOCREV CUR MEDS BY ELIG CLIN: HCPCS | Performed by: FAMILY MEDICINE

## 2021-04-28 PROCEDURE — 82947 ASSAY GLUCOSE BLOOD QUANT: CPT | Performed by: FAMILY MEDICINE

## 2021-04-28 PROCEDURE — G8510 SCR DEP NEG, NO PLAN REQD: HCPCS | Performed by: FAMILY MEDICINE

## 2021-04-28 PROCEDURE — G0463 HOSPITAL OUTPT CLINIC VISIT: HCPCS | Performed by: FAMILY MEDICINE

## 2021-04-28 PROCEDURE — G8536 NO DOC ELDER MAL SCRN: HCPCS | Performed by: FAMILY MEDICINE

## 2021-04-28 PROCEDURE — 1090F PRES/ABSN URINE INCON ASSESS: CPT | Performed by: FAMILY MEDICINE

## 2021-04-28 PROCEDURE — G0439 PPPS, SUBSEQ VISIT: HCPCS | Performed by: FAMILY MEDICINE

## 2021-04-28 PROCEDURE — 82272 OCCULT BLD FECES 1-3 TESTS: CPT | Performed by: FAMILY MEDICINE

## 2021-04-28 PROCEDURE — 81003 URINALYSIS AUTO W/O SCOPE: CPT | Performed by: FAMILY MEDICINE

## 2021-04-28 PROCEDURE — G8419 CALC BMI OUT NRM PARAM NOF/U: HCPCS | Performed by: FAMILY MEDICINE

## 2021-04-28 RX ORDER — MINOXIDIL 10 MG/1
TABLET ORAL
Qty: 45 TAB | Refills: 3 | Status: SHIPPED | OUTPATIENT
Start: 2021-04-28 | End: 2022-02-17

## 2021-04-28 RX ORDER — METFORMIN HYDROCHLORIDE 500 MG/1
500 TABLET, EXTENDED RELEASE ORAL
Qty: 90 TAB | Refills: 3 | Status: SHIPPED | OUTPATIENT
Start: 2021-04-28 | End: 2022-02-17

## 2021-04-28 RX ORDER — CLONIDINE HYDROCHLORIDE 0.3 MG/1
0.3 TABLET ORAL
COMMUNITY
End: 2021-09-27

## 2021-04-28 NOTE — PROGRESS NOTES
Chief Complaint   Patient presents with   Tramaine Akbar Annual Wellness Visit       1. Have you been to the ER, urgent care clinic since your last visit? Hospitalized since your last visit? No    2. Have you seen or consulted any other health care providers outside of the [address] [place] [address] since your last visit? Include any pap smears or colon screening. Yes, Dr. Suzy GARCIA for follow up and bi weekly injections.      Health Maintenance Due   Topic Date Due    Medicare Yearly Exam  08/13/2020    Foot Exam Q1  08/13/2020    MICROALBUMIN Q1  12/13/2020

## 2021-04-28 NOTE — PROGRESS NOTES
This is a Subsequent Medicare Annual Wellness Exam (AWV) (Performed 12 months after IPPE or effective date of Medicare Part B enrollment)    I have reviewed the patient's medical history in detail and updated the computerized patient record. History     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    Essential hypertension, malignant I10    Mixed hyperlipidemia E78.2    ELEAZAR on CPAP G47.33, Z99.89    Encounter for medication monitoring Z51.81    Type 2 diabetes mellitus without complication ([facility] N79.1    Type 2 diabetes with nephropathy ([facility] E11.21    WALKER (dyspnea on exertion) R06.00       Current Outpatient Medications   Medication Sig Dispense Refill    rosuvastatin (CRESTOR) 5 mg tablet TAKE 1 TABLET BY MOUTH  NIGHTLY 90 Tab 3    metFORMIN ER (GLUCOPHAGE XR) 500 mg tablet Take 1 Tab by mouth daily (with dinner). 90 Tab 3    minoxidiL (LONITEN) 10 mg tablet TAKE ONE-HALF TABLET BY  MOUTH DAILY 45 Tab 3    allopurinoL (ZYLOPRIM) 100 mg tablet Take 1 Tab by mouth daily. 90 Tab 3    spironolactone (ALDACTONE) 25 mg tablet TAKE 1 TABLET BY MOUTH TWO  TIMES DAILY 180 Tab 3    metoprolol tartrate (LOPRESSOR) 100 mg IR tablet TAKE 1 TABLET BY MOUTH  TWICE A  Tab 3    amLODIPine (NORVASC) 10 mg tablet TAKE 1 TABLET BY MOUTH ONCE A DAY 90 Tab 3    cloNIDine HCL (CATAPRES) 0.3 mg tablet TAKE ONE-HALF TABLET BY  MOUTH DAILY AFTER LUNCH AND TAKE 1 TABLET EVERY NIGHT  AT BEDTIME 135 Tab 3    chlorthalidone (HYGROTEN) 25 mg tablet TAKE 1 TABLET BY MOUTH TWO  TIMES DAILY 180 Tab 3    mupirocin calcium (BACTROBAN) 2 % topical cream Apply  to affected area two (2) times daily as needed (skin irritation). 30 g 1    ascorbate calcium 500 mg tab Take 500 mg by mouth daily.       meclizine (ANTIVERT) 12.5 mg tablet Take 1 Tab by mouth three (3) times daily as needed. Take as needed for dizziness. 30 Tab 0    ondansetron (ZOFRAN ODT) 4 mg disintegrating tablet Take 4 mg by mouth every eight (8) hours as needed for Nausea.  fluticasone (FLONASE) 50 mcg/actuation nasal spray 2 Sprays by Both Nostrils route daily as needed.  polyethylene glycol (MIRALAX) 17 gram packet Take 17 g by mouth daily as needed.  cyanocobalamin (VITAMIN B-12) 100 mcg tablet Take 100 mcg by mouth daily.  EPOETIN BRYAN (PROCRIT IJ) 1,000 Units by Injection route every fourteen (14) days. Depending on blood count       omega-3 fatty acids-vitamin e (FISH OIL) 1,000 mg Cap Take 1 Tab by mouth two (2) times a day.  cholecalciferol, vitamin d3, (VITAMIN D) 1,000 unit tablet Take 1,000 Units by mouth daily. Allergies   Allergen Reactions    Pcn [Penicillins] Swelling     tongue    Bactrim [Sulfamethoxazole-Trimethoprim] Diarrhea    Ceftin [Cefuroxime Axetil] Diarrhea    Hydralazine Other (comments)     edema    Sulfa (Sulfonamide Antibiotics) Itching       Past Medical History:   Diagnosis Date    Anemia NEC     Benign neoplasm of adrenal gland     Chronic anemia 6/1/2010    Constipation     on a daily stool softener which pt states helps as of 4/27/16    Diabetes ([address]     WALKER (dyspnea on exertion)     as of 4/27/16 pt states this is her baseline and is not getting any worse    GERD (gastroesophageal reflux disease)     Goiter 2011    as of 4/27/16 Pt states \"I am not even sure I have it\".   Pt denies any problems    Gout 6/1/2010    as of 4/27/16 pt denies any sx    HTN (hypertension) 6/1/2010    followed by cardiology Dr Stephanie Keene  [address] [place] Multiple myeloma, without mention of having achieved remission Dx 11/1/99    followed by Chad Maguire    Pure hypercholesterolemia 6/1/2010    Sleep apnea 6/1/2010    no CPAP    Spinal stenosis 6/1/2010       Past Surgical History: Procedure Laterality Date    COLONOSCOPY N/A 7/15/2019    flex sig , polypectomy performed by Mary Kate Cronin MD at [facility] AM[facility]ORY OR    HX HYSTERECTOMY  1970s    HX ORTHOPAEDIC      left hand-trigger finger    HX ORTHOPAEDIC      right hand- trigger finger    [place] COLONOSCOPY FLX DX W/COLLJ SPEC WHEN PFRMD  08/10/2010    [facility]       Family History   Problem Relation Age of Onset    Hypertension Mother     No Known Problems Father     Cancer Sister 52        colon     Diabetes Sister     Kidney Disease Sister     Hypertension Sister     Elevated Lipids Sister        Social History     Tobacco Use    Smoking status: Former Smoker     Quit date: 1980     Years since quittin.3    Smokeless tobacco: Never Used   Substance Use Topics    Alcohol use: No     Alcohol/week: 0.0 standard drinks         Depression Risk Factor Screening:     PHQ over the last two weeks    Little interest or pleasure in doing things Not at all   Feeling down, depressed or hopeless Not at all   Total Score PHQ 2 0     Alcohol Risk Factor Screening: You do not drink alcohol or very rarely. Functional Ability and Level of Safety:   Hearing Loss  Hearing is good. Activities of Daily Living  The home contains: discussed safety equipment. Patient does total self care    Fall RiskFall Risk Assessment, last 12 mths    Able to walk? Yes   Fall in past 12 months? No     Functional Ability:   Does the patient exhibit a steady gait? yes    How long did it take the patient to get up and walk from a sitting position? seconds    Is the patient self reliant? (ie can do own laundry, meals, household chores)  yes   Does the patient handle his/her own medications? yes   Does the patient handle his/her own money? yes   Is the patients home safe (ie good lighting, handrails on stairs and bath, etc.)? yes   Did you notice or did patient express any hearing difficulties?   no   Did you notice or did patient express any vision difficulties? no        Advance Care Planning:   Patient was offered the opportunity to discuss advance care planning:  yes    Does patient have an Advance Directive:  no   If no, did you provide information on Caring Connections? yes      Abuse Screen  Patient is not abused    Cognitive Screening   Evaluation of Cognitive Function:  Has your family/caregiver stated any concerns about your memory: no      Patient Care Team   Patient Care Team:  Diandra Leiva MD as PCP - General    Assessment/Plan   Education and counseling provided:  Are appropriate based on today's review and evaluation  End-of-Life planning (with patient's consent)    ASSESSMENT and PLAN    Medicare Annual Wellness  Continue current treatment plan. Continue annual follow up. I have discussed diagnosis listed in this note with pt and/or family. I have discussed treatment plans and options and the risk/benefit analysis of those options, including safe use of medications and possible medication side effects. Through the use of shared decision making we have agreed to the above plan. The patient has received an after-visit summary and questions were answered concerning future plans and follow up. Advise pt of any urgent changes then to proceed to the ER.

## 2021-04-28 NOTE — PROGRESS NOTES
HISTORY OF PRESENT ILLNESS  Giovana Leary is a 80 y.o. female. HPI   Follow up on chronic medical problems. Overall feeling well. Doing the precautionary measures at home to reduce risks of exposure COVID19. Also wearing mask when she is going out. No known sick contacts or known exposure to [address]. Cardiovascular Review:  The patient has hypertension, ELEAZAR (but has not been using the CPAP) and hyperlipidemia. Denies chest pain or chest tightness. SOB is at her usual.  No cough or congestion noted. Diet and Lifestyle: generally follows a low fat low cholesterol diet, generally follows a low sodium diet, follows a diabetic diet regularly  Home BP Monitoring: is well controlled at home, ranging 130s's/70-80's. Pertinent ROS: taking medications as instructed, no medication side effects noted, no TIA's, no chest pain on exertion, no swelling of ankles. Diabetes Mellitus:  She has diabetes mellitus. Diabetic ROS - diabetic diet compliance: compliant most of the time, home glucose monitoring: is performed regularly, fasting values range low 100s,  Pt does not have BS log with her today, further diabetic ROS: no polyuria or polydipsia, no chest pain, dyspnea or TIA's, no numbness, tingling or pain in extremities, no unusual visual symptoms, no hypoglycemia. Weight is down by 9 pounds since last seen last December but states that her appetite is good. Just has not been cooking as much. Lab review: orders written for new lab studies as appropriate; see orders. Chronic anemia and multiple myeloma in remission is being followed by hematology. Osteoarthritis:  Patient has osteoarthritis. She also had know spinal stenosis. She has been having pains in several joints that migrate and comes and goes. Overall the pain in the joints and lower back has been doing good with minimal pain. Symptoms onset: problem is longstanding.   Rheumatological ROS: stable, mild-to-moderate joint symptoms intermittently, reasonably well controlled by PRN meds. Response to treatment plan: stable and intermittent. 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    Essential hypertension, malignant I10    Mixed hyperlipidemia E78.2    ELEAZAR on CPAP G47.33, Z99.89    Encounter for medication monitoring Z51.81    Type 2 diabetes mellitus without complication (HCC) E07.5    Type 2 diabetes with nephropathy (HCC) E11.21    WALKER (dyspnea on exertion) R06.00       Current Outpatient Medications   Medication Sig Dispense Refill    rosuvastatin (CRESTOR) 5 mg tablet TAKE 1 TABLET BY MOUTH  NIGHTLY 90 Tab 3    metFORMIN ER (GLUCOPHAGE XR) 500 mg tablet Take 1 Tab by mouth daily (with dinner). 90 Tab 3    minoxidiL (LONITEN) 10 mg tablet TAKE ONE-HALF TABLET BY  MOUTH DAILY 45 Tab 3    allopurinoL (ZYLOPRIM) 100 mg tablet Take 1 Tab by mouth daily. 90 Tab 3    spironolactone (ALDACTONE) 25 mg tablet TAKE 1 TABLET BY MOUTH TWO  TIMES DAILY 180 Tab 3    metoprolol tartrate (LOPRESSOR) 100 mg IR tablet TAKE 1 TABLET BY MOUTH  TWICE A  Tab 3    amLODIPine (NORVASC) 10 mg tablet TAKE 1 TABLET BY MOUTH ONCE A DAY 90 Tab 3    cloNIDine HCL (CATAPRES) 0.3 mg tablet TAKE ONE-HALF TABLET BY  MOUTH DAILY AFTER LUNCH AND TAKE 1 TABLET EVERY NIGHT  AT BEDTIME 135 Tab 3    chlorthalidone (HYGROTEN) 25 mg tablet TAKE 1 TABLET BY MOUTH TWO  TIMES DAILY 180 Tab 3    mupirocin calcium (BACTROBAN) 2 % topical cream Apply  to affected area two (2) times daily as needed (skin irritation). 30 g 1    ascorbate calcium 500 mg tab Take 500 mg by mouth daily.  meclizine (ANTIVERT) 12.5 mg tablet Take 1 Tab by mouth three (3) times daily as needed. Take as needed for dizziness.  30 Tab 0    ondansetron (ZOFRAN ODT) 4 mg disintegrating tablet Take 4 mg by mouth every eight (8) hours as needed for Nausea.  fluticasone (FLONASE) 50 mcg/actuation nasal spray 2 Sprays by Both Nostrils route daily as needed.  polyethylene glycol (MIRALAX) 17 gram packet Take 17 g by mouth daily as needed.  cyanocobalamin (VITAMIN B-12) 100 mcg tablet Take 100 mcg by mouth daily.  EPOETIN BRYAN (PROCRIT IJ) 1,000 Units by Injection route every fourteen (14) days. Depending on blood count       omega-3 fatty acids-vitamin e (FISH OIL) 1,000 mg Cap Take 1 Tab by mouth two (2) times a day.  cholecalciferol, vitamin d3, (VITAMIN D) 1,000 unit tablet Take 1,000 Units by mouth daily. Allergies   Allergen Reactions    Pcn [Penicillins] Swelling     tongue    Bactrim [Sulfamethoxazole-Trimethoprim] Diarrhea    Ceftin [Cefuroxime Axetil] Diarrhea    Hydralazine Other (comments)     edema    Sulfa (Sulfonamide Antibiotics) Itching       Past Medical History:   Diagnosis Date    Anemia NEC     Benign neoplasm of adrenal gland     Chronic anemia 6/1/2010    Constipation     on a daily stool softener which pt states helps as of 4/27/16    Diabetes ([address]     WALKER (dyspnea on exertion)     as of 4/27/16 pt states this is her baseline and is not getting any worse    GERD (gastroesophageal reflux disease)     Goiter 2011    as of 4/27/16 Pt states \"I am not even sure I have it\".   Pt denies any problems    Gout 6/1/2010    as of 4/27/16 pt denies any sx    HTN (hypertension) 6/1/2010    followed by cardiology Dr Ingrid Butler  [address] [place] Multiple myeloma, without mention of having achieved remission Dx 11/1/99    followed by Keerthi Jaramillo    Pure hypercholesterolemia 6/1/2010    Sleep apnea 6/1/2010    no CPAP    Spinal stenosis 6/1/2010       Past Surgical History:   Procedure Laterality Date    COLONOSCOPY N/A 7/15/2019    flex sig , polypectomy [facility]    HX HYSTERECTOMY 1970s    HX ORTHOPAEDIC  2005    left hand-trigger finger    HX ORTHOPAEDIC  2007    right hand- trigger finger    AL COLONOSCOPY FLX DX W/COLLJ SPEC WHEN PFRMD  08/10/2010    Maggie Perea       Family History   Problem Relation Age of Onset    Hypertension Mother     No Known Problems Father     Cancer Sister 52        colon     Diabetes Sister     Kidney Disease Sister     Hypertension Sister     Elevated Lipids Sister        Social History     Tobacco Use    Smoking status: Former Smoker     Quit date: 1980     Years since quittin.3    Smokeless tobacco: Never Used   Substance Use Topics    Alcohol use: No     Alcohol/week: 0.0 standard drinks        Lab Results   Component Value Date/Time    WBC 5.8 10/27/2020 12:27 PM    HGB 10.4 (L) 10/27/2020 12:27 PM    HCT 32.5 (L) 10/27/2020 12:27 PM    PLATELET 339 (L) 15/37/9350 12:27 PM    MCV 92 10/27/2020 12:27 PM     Lab Results   Component Value Date/Time    Cholesterol, total 120 10/27/2020 12:27 PM    HDL Cholesterol 35 (L) 10/27/2020 12:27 PM    LDL, calculated 67 10/27/2020 12:27 PM    LDL, calculated 49 2019 10:46 AM    LDL-C, External 57 10/14/2015    Triglyceride 96 10/27/2020 12:27 PM    CHOL/HDL Ratio 2.8 2010 09:57 AM     Lab Results   Component Value Date/Time    TSH 2.860 2011 09:29 AM      Lab Results   Component Value Date/Time    Sodium 139 10/27/2020 12:27 PM    Potassium 4.6 10/27/2020 12:27 PM    Chloride 108 (H) 10/27/2020 12:27 PM    CO2 20 10/27/2020 12:27 PM    Anion gap 9 2010 04:04 PM    Glucose 100 (H) 10/27/2020 12:27 PM    BUN 33 (H) 10/27/2020 12:27 PM    Creatinine 1.45 (H) 10/27/2020 12:27 PM    BUN/Creatinine ratio 23 10/27/2020 12:27 PM    GFR est AA 38 (L) 10/27/2020 12:27 PM    GFR est non-AA 33 (L) 10/27/2020 12:27 PM    Calcium 9.9 10/27/2020 12:27 PM    Bilirubin, total 0.5 10/27/2020 12:27 PM    ALT (SGPT) 10 10/27/2020 12:27 PM    Alk.  phosphatase 56 10/27/2020 12:27 PM    Protein, total 8.8 (H) 10/27/2020 12:27 PM    Albumin 4.3 10/27/2020 12:27 PM    Globulin 5.6 (H) 06/02/2010 09:57 AM    A-G Ratio 1.0 (L) 10/27/2020 12:27 PM      Lab Results   Component Value Date/Time    Hemoglobin A1c 6.2 (H) 10/27/2020 12:27 PM    Hemoglobin A1c (POC) 5.9 12/13/2019 08:40 AM    Hemoglobin A1c, External 6.4 10/14/2015         Review of Systems   Constitutional: Negative for malaise/fatigue. HENT: Negative for congestion. Eyes: Negative for blurred vision. Respiratory: Negative for cough and shortness of breath. Cardiovascular: Negative for chest pain, palpitations and leg swelling. Gastrointestinal: Negative for abdominal pain, constipation and heartburn. Genitourinary: Negative for dysuria, frequency and urgency. Musculoskeletal: Negative for back pain and joint pain. Neurological: Negative for dizziness, tingling and headaches. Endo/Heme/Allergies: Negative for environmental allergies. Psychiatric/Behavioral: Negative for depression. The patient does not have insomnia. Physical Exam  Vitals signs and nursing note reviewed. Constitutional:       Appearance: Normal appearance. She is well-developed. Comments: BP (!) 138/52 (BP 1 Location: Right arm, BP Patient Position: Sitting, BP Cuff Size: Small adult)   Pulse (!) 50   Temp 97.3 °F (36.3 °C) (Skin)   Resp 14   Ht 5' 1.5\" (1.562 m)   Wt 138 lb 3.2 oz (62.7 kg)   SpO2 100%   BMI 25.69 kg/m²    HENT:      Right Ear: Tympanic membrane and ear canal normal.      Left Ear: Tympanic membrane and ear canal normal.      Nose: No mucosal edema or rhinorrhea. Neck:      Musculoskeletal: Normal range of motion and neck supple. Thyroid: No thyromegaly. Vascular: No carotid bruit. Cardiovascular:      Rate and Rhythm: Normal rate and regular rhythm. Pulses: Normal pulses. Heart sounds: Normal heart sounds. No gallop.     Pulmonary:      Effort: Pulmonary effort is normal.      Breath sounds: Normal breath sounds. Chest:      Breasts:         Right: Normal.         Left: Normal.   Abdominal:      General: Bowel sounds are normal.      Palpations: Abdomen is soft. There is no mass. Tenderness: There is no abdominal tenderness. Genitourinary:     Rectum: Guaiac result negative. Musculoskeletal: Normal range of motion. General: No swelling. Right lower leg: No edema. Left lower leg: No edema. Comments: Foot exam done . Normal sensation to PP, LT and decreased sensation to vibration. Sensation intact to microfilament testing. Pulses intact. No swelling. No skin lesions or sores noted. No tinea present. Lymphadenopathy:      Cervical: No cervical adenopathy. Upper Body:      Right upper body: No supraclavicular, axillary or pectoral adenopathy. Left upper body: No supraclavicular, axillary or pectoral adenopathy. Skin:     General: Skin is warm and dry. Neurological:      General: No focal deficit present. Mental Status: She is alert and oriented to person, place, and time. Psychiatric:         Mood and Affect: Mood normal.         ASSESSMENT and PLAN  Diagnoses and all orders for this visit:    1. Medicare annual wellness visit, subsequent    2. Essential hypertension  -     Refill minoxidiL (LONITEN) 10 mg tablet; TAKE ONE-HALF TABLET BY  MOUTH DAILY  Discussed sodium restriction, high k rich diet, maintaining ideal body weight and regular exercise program such as daily walking 30 min perday 4-5 times per week, as physiologic means to achieve blood pressure control. Medication compliance advised. 3. Type 2 diabetes mellitus without complication, without long-term current use of insulin ([facility] [address]  Continue to monitor. Work on diet and exercise.   -     MICROALBUMIN, UR, RAND W/ MICROALB/CREAT RATIO; Future  -     AMB POC URINALYSIS DIP STICK AUTO W/O MICRO  -     AMB POC HEMOGLOBIN A1C  -     AMB POC GLUCOSE, QUANTITATIVE, BLOOD  -     Refill metFORMIN ER (GLUCOPHAGE XR) 500 mg tablet; Take 1 Tab by mouth daily (with dinner). 4. Mixed hyperlipidemia  -     LIPID PANEL; Future    5. Chronic anemia  -     CBC W/O DIFF; Future  Followed by hematology    6. Primary osteoarthritis involving multiple joints//  7. Spinal stenosis of lumbar region without neurogenic claudication  Overall has been stable     8. ELEAZAR (obstructive sleep apnea)  Not interested in using CPAP    9. Encounter for medication monitoring  -     METABOLIC PANEL, COMPREHENSIVE; Future        Follow-up and Dispositions    · Return in about 5 months (around 9/28/2021). current treatment plan is effective, no change in therapy  reviewed diet, exercise and weight control  cardiovascular risk and specific lipid/LDL goals reviewed  reviewed medications and side effects in detail  specific diabetic recommendations: low cholesterol diet, weight control and daily exercise discussed, home glucose monitoring emphasized, all medications, side effects and compliance discussed carefully, foot care discussed and Podiatry visits discussed, annual eye examinations at Ophthalmology discussed and glycohemoglobin and other lab monitoring discussed     I have discussed diagnosis listed in this note with pt and/or family. I have discussed treatment plans and options and the risk/benefit analysis of those options, including safe use of medications and possible medication side effects. Through the use of shared decision making we have agreed to the above plan. The patient has received an after-visit summary and questions were answered concerning future plans and follow up. Advise pt of any urgent changes then to proceed to the ER.

## 2021-06-21 ENCOUNTER — TELEPHONE (OUTPATIENT)
Dept: FAMILY MEDICINE CLINIC | Age: 86
End: 2021-06-21

## 2021-06-21 DIAGNOSIS — E11.9 TYPE 2 DIABETES MELLITUS WITHOUT COMPLICATION, WITHOUT LONG-TERM CURRENT USE OF INSULIN (HCC): Primary | ICD-10-CM

## 2021-06-21 NOTE — TELEPHONE ENCOUNTER
Patient states that Mili Townsend was suppose to set her up to see a foot doctor she has not heard anything as of yet she can be reached @ [phone]

## 2021-06-23 ENCOUNTER — TELEPHONE (OUTPATIENT)
Dept: FAMILY MEDICINE CLINIC | Age: 86
End: 2021-06-23

## 2021-06-23 NOTE — TELEPHONE ENCOUNTER
Called patient and appt has been made with Dr. Glennda Cogan located at [address] [place] [address] for 7/13/2021 at 10:15am and arrive by 10am. Patient verbalized understanding.

## 2021-07-19 RX ORDER — CHLORTHALIDONE 25 MG/1
TABLET ORAL
Qty: 180 TABLET | Refills: 3 | Status: SHIPPED | OUTPATIENT
Start: 2021-07-19 | End: 2022-05-16

## 2021-07-19 RX ORDER — ALLOPURINOL 100 MG/1
TABLET ORAL
Qty: 90 TABLET | Refills: 3 | Status: SHIPPED | OUTPATIENT
Start: 2021-07-19 | End: 2022-05-16

## 2021-09-24 DIAGNOSIS — I10 ESSENTIAL HYPERTENSION: ICD-10-CM

## 2021-09-27 RX ORDER — AMLODIPINE BESYLATE 10 MG/1
TABLET ORAL
Qty: 90 TABLET | Refills: 3 | Status: SHIPPED | OUTPATIENT
Start: 2021-09-27 | End: 2022-11-02

## 2021-09-27 RX ORDER — SPIRONOLACTONE 25 MG/1
TABLET ORAL
Qty: 180 TABLET | Refills: 3 | Status: SHIPPED | OUTPATIENT
Start: 2021-09-27 | End: 2022-11-02

## 2021-09-27 RX ORDER — CLONIDINE HYDROCHLORIDE 0.3 MG/1
TABLET ORAL
Qty: 135 TABLET | Refills: 3 | Status: SHIPPED | OUTPATIENT
Start: 2021-09-27 | End: 2021-09-29 | Stop reason: DRUGHIGH

## 2021-09-27 RX ORDER — METOPROLOL TARTRATE 100 MG/1
TABLET ORAL
Qty: 180 TABLET | Refills: 3 | Status: SHIPPED | OUTPATIENT
Start: 2021-09-27 | End: 2022-11-02

## 2021-09-29 ENCOUNTER — OFFICE VISIT (OUTPATIENT)
Dept: FAMILY MEDICINE CLINIC | Age: 86
End: 2021-09-29
Payer: MEDICARE

## 2021-09-29 VITALS
SYSTOLIC BLOOD PRESSURE: 132 MMHG | HEART RATE: 56 BPM | BODY MASS INDEX: 25.88 KG/M2 | HEIGHT: 62 IN | RESPIRATION RATE: 16 BRPM | OXYGEN SATURATION: 98 % | TEMPERATURE: 97.2 F | WEIGHT: 140.6 LBS | DIASTOLIC BLOOD PRESSURE: 56 MMHG

## 2021-09-29 DIAGNOSIS — Z51.81 ENCOUNTER FOR MEDICATION MONITORING: ICD-10-CM

## 2021-09-29 DIAGNOSIS — Z23 NEEDS FLU SHOT: ICD-10-CM

## 2021-09-29 DIAGNOSIS — D64.9 CHRONIC ANEMIA: ICD-10-CM

## 2021-09-29 DIAGNOSIS — I10 ESSENTIAL HYPERTENSION: Primary | ICD-10-CM

## 2021-09-29 DIAGNOSIS — M15.9 PRIMARY OSTEOARTHRITIS INVOLVING MULTIPLE JOINTS: ICD-10-CM

## 2021-09-29 DIAGNOSIS — E78.2 MIXED HYPERLIPIDEMIA: ICD-10-CM

## 2021-09-29 DIAGNOSIS — E11.9 TYPE 2 DIABETES MELLITUS WITHOUT COMPLICATION, WITHOUT LONG-TERM CURRENT USE OF INSULIN (HCC): ICD-10-CM

## 2021-09-29 DIAGNOSIS — G47.33 OSA (OBSTRUCTIVE SLEEP APNEA): ICD-10-CM

## 2021-09-29 DIAGNOSIS — Z12.31 ENCOUNTER FOR SCREENING MAMMOGRAM FOR BREAST CANCER: ICD-10-CM

## 2021-09-29 LAB
ALBUMIN SERPL-MCNC: 3.4 G/DL (ref 3.5–5)
ALBUMIN/GLOB SERPL: 0.6 {RATIO} (ref 1.1–2.2)
ALP SERPL-CCNC: 59 U/L (ref 45–117)
ALT SERPL-CCNC: 16 U/L (ref 12–78)
ANION GAP SERPL CALC-SCNC: 5 MMOL/L (ref 5–15)
AST SERPL-CCNC: 14 U/L (ref 15–37)
BILIRUB SERPL-MCNC: 0.3 MG/DL (ref 0.2–1)
BUN SERPL-MCNC: 31 MG/DL (ref 6–20)
BUN/CREAT SERPL: 21 (ref 12–20)
CALCIUM SERPL-MCNC: 9.4 MG/DL (ref 8.5–10.1)
CHLORIDE SERPL-SCNC: 114 MMOL/L (ref 97–108)
CHOLEST SERPL-MCNC: 97 MG/DL
CO2 SERPL-SCNC: 20 MMOL/L (ref 21–32)
CREAT SERPL-MCNC: 1.45 MG/DL (ref 0.55–1.02)
ERYTHROCYTE [DISTWIDTH] IN BLOOD BY AUTOMATED COUNT: 16.1 % (ref 11.5–14.5)
GLOBULIN SER CALC-MCNC: 5.5 G/DL (ref 2–4)
GLUCOSE POC: 120 MG/DL
GLUCOSE SERPL-MCNC: 92 MG/DL (ref 65–100)
HBA1C MFR BLD HPLC: 5.8 %
HCT VFR BLD AUTO: 32.9 % (ref 35–47)
HDLC SERPL-MCNC: 41 MG/DL
HDLC SERPL: 2.4 {RATIO} (ref 0–5)
HGB BLD-MCNC: 10.1 G/DL (ref 11.5–16)
LDLC SERPL CALC-MCNC: 37.2 MG/DL (ref 0–100)
MCH RBC QN AUTO: 29.8 PG (ref 26–34)
MCHC RBC AUTO-ENTMCNC: 30.7 G/DL (ref 30–36.5)
MCV RBC AUTO: 97.1 FL (ref 80–99)
NRBC # BLD: 0 K/UL (ref 0–0.01)
NRBC BLD-RTO: 0 PER 100 WBC
PLATELET # BLD AUTO: 132 K/UL (ref 150–400)
PMV BLD AUTO: 12.2 FL (ref 8.9–12.9)
POTASSIUM SERPL-SCNC: 5.1 MMOL/L (ref 3.5–5.1)
PROT SERPL-MCNC: 8.9 G/DL (ref 6.4–8.2)
RBC # BLD AUTO: 3.39 M/UL (ref 3.8–5.2)
SODIUM SERPL-SCNC: 139 MMOL/L (ref 136–145)
TRIGL SERPL-MCNC: 94 MG/DL (ref ?–150)
VLDLC SERPL CALC-MCNC: 18.8 MG/DL
WBC # BLD AUTO: 4.4 K/UL (ref 3.6–11)

## 2021-09-29 PROCEDURE — G8427 DOCREV CUR MEDS BY ELIG CLIN: HCPCS | Performed by: FAMILY MEDICINE

## 2021-09-29 PROCEDURE — 99214 OFFICE O/P EST MOD 30 MIN: CPT | Performed by: FAMILY MEDICINE

## 2021-09-29 PROCEDURE — 83036 HEMOGLOBIN GLYCOSYLATED A1C: CPT | Performed by: FAMILY MEDICINE

## 2021-09-29 PROCEDURE — G0463 HOSPITAL OUTPT CLINIC VISIT: HCPCS | Performed by: FAMILY MEDICINE

## 2021-09-29 PROCEDURE — 90694 VACC AIIV4 NO PRSRV 0.5ML IM: CPT | Performed by: FAMILY MEDICINE

## 2021-09-29 PROCEDURE — G8510 SCR DEP NEG, NO PLAN REQD: HCPCS | Performed by: FAMILY MEDICINE

## 2021-09-29 PROCEDURE — G8419 CALC BMI OUT NRM PARAM NOF/U: HCPCS | Performed by: FAMILY MEDICINE

## 2021-09-29 PROCEDURE — 1101F PT FALLS ASSESS-DOCD LE1/YR: CPT | Performed by: FAMILY MEDICINE

## 2021-09-29 PROCEDURE — 1090F PRES/ABSN URINE INCON ASSESS: CPT | Performed by: FAMILY MEDICINE

## 2021-09-29 PROCEDURE — 82947 ASSAY GLUCOSE BLOOD QUANT: CPT | Performed by: FAMILY MEDICINE

## 2021-09-29 PROCEDURE — G8536 NO DOC ELDER MAL SCRN: HCPCS | Performed by: FAMILY MEDICINE

## 2021-09-29 RX ORDER — CLONIDINE HYDROCHLORIDE 0.3 MG/1
0.3 TABLET ORAL
COMMUNITY
End: 2022-06-07 | Stop reason: SDUPTHER

## 2021-09-29 NOTE — PROGRESS NOTES
Chief Complaint   Patient presents with    Hypertension     follow up    Cholesterol Problem     follow up    Diabetes     follow up             Microalbumin 4/28/2021    Mammogram 12/29/2020    Bone density 5/19/2014    Colonoscopy 7/15/2019 by  Dee Francois and was told she did not need another unless she had a problem. Eye exam was 1/27/2021 by Dr. Ryanne Dougherty. Verbal order received by Dr. Russell Sultana dose flu vaccine IM. Pt received high dose flu vaccine IM in left deltoid without any difficulty. 1. Have you been to the ER, urgent care clinic since your last visit? Hospitalized since your last visit? no    2. Have you seen or consulted any other health care providers outside of the [address] [place] [address] since your last visit? Include any pap smears or colon screening.  no

## 2021-09-29 NOTE — PROGRESS NOTES
HISTORY OF PRESENT ILLNESS  Brent Orozco is a 80 y.o. female. Follow up on chronic medical problems. Overall feeling well. Doing the precautionary measures at home to reduce risks of exposure COVID19. Also wearing mask when she is going out. No known sick contacts or known exposure to [address]. Cardiovascular Review:  The patient has hypertension, ELEAZAR (but has not been using the CPAP) and hyperlipidemia. Denies chest pain or chest tightness. SOB is at her usual.  No cough or congestion noted. Diet and Lifestyle: generally follows a low fat low cholesterol diet, generally follows a low sodium diet, follows a diabetic diet regularly  Home BP Monitoring: is well controlled at home, ranging 130s's/70-80's. Pertinent ROS: taking medications as instructed, no medication side effects noted, no TIA's, no chest pain on exertion, no swelling of ankles. Diabetes Mellitus:  She has diabetes mellitus. Diabetic ROS - diabetic diet compliance: compliant most of the time, home glucose monitoring: is performed regularly, fasting values range low 100s,  Pt does not have BS log with her today, further diabetic ROS: no polyuria or polydipsia, no chest pain, dyspnea or TIA's, no numbness, tingling or pain in extremities, no unusual visual symptoms, no hypoglycemia. Weight is down by 9 pounds since last seen last December but states that her appetite is good. Just has not been cooking as much. Lab review: orders written for new lab studies as appropriate; see orders. Chronic anemia and multiple myeloma in remission is being followed by hematology. Osteoarthritis:  Patient has osteoarthritis. She also had know spinal stenosis. She has been having pains in several joints that migrate and comes and goes. Overall the pain in the joints and lower back has been doing good with minimal pain. Symptoms onset: problem is longstanding.   Rheumatological ROS: stable, mild-to-moderate joint symptoms intermittently, reasonably well controlled by PRN meds. Response to treatment plan: stable and intermittent. HM:  Microalbumin 4/28/2021  Mammogram 12/29/2020  Colonoscopy 7/15/2019 by  Verner Fails and was told she did not need another unless she had a problem. Eye exam was 1/27/2021 by Dr. Yoselin Don. 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    Essential hypertension, malignant I10    Mixed hyperlipidemia E78.2    ELEAZAR on CPAP G47.33, Z99.89    Encounter for medication monitoring Z51.81    Type 2 diabetes mellitus without complication ([facility]) C64.8    Type 2 diabetes with nephropathy ([facility] [address] E11.21    WALKER (dyspnea on exertion) R06.00       Current Outpatient Medications   Medication Sig Dispense Refill    cloNIDine HCL (CATAPRES) 0.3 mg tablet Take 0.3 mg by mouth nightly.  amLODIPine (NORVASC) 10 mg tablet TAKE 1 TABLET BY MOUTH ONCE DAILY 90 Tablet 3    spironolactone (ALDACTONE) 25 mg tablet TAKE 1 TABLET BY MOUTH  TWICE DAILY 180 Tablet 3    metoprolol tartrate (LOPRESSOR) 100 mg IR tablet TAKE 1 TABLET BY MOUTH  TWICE DAILY 180 Tablet 3    cloNIDine HCL (CATAPRES) 0.3 mg tablet TAKE ONE-HALF TABLET BY  MOUTH DAILY AFTER LUNCH AND TAKE 1 TABLET EVERY NIGHT  AT BEDTIME (Patient taking differently: Take 0.3 mg by mouth nightly.) 135 Tablet 3    chlorthalidone (HYGROTON) 25 mg tablet TAKE 1 TABLET BY MOUTH  TWICE DAILY 180 Tablet 3    allopurinoL (ZYLOPRIM) 100 mg tablet TAKE 1 TABLET BY MOUTH  DAILY 90 Tablet 3    metFORMIN ER (GLUCOPHAGE XR) 500 mg tablet Take 1 Tab by mouth daily (with dinner).  90 Tab 3    minoxidiL (LONITEN) 10 mg tablet TAKE ONE-HALF TABLET BY  MOUTH DAILY 45 Tab 3    rosuvastatin (CRESTOR) 5 mg tablet TAKE 1 TABLET BY MOUTH  NIGHTLY 90 Tab 3    mupirocin calcium (BACTROBAN) 2 % topical cream Apply  to affected area two (2) times daily as needed (skin irritation). 30 g 1    meclizine (ANTIVERT) 12.5 mg tablet Take 1 Tab by mouth three (3) times daily as needed. Take as needed for dizziness. 30 Tab 0    ondansetron (ZOFRAN ODT) 4 mg disintegrating tablet Take 4 mg by mouth every eight (8) hours as needed for Nausea.  fluticasone (FLONASE) 50 mcg/actuation nasal spray 2 Sprays by Both Nostrils route daily as needed.  polyethylene glycol (MIRALAX) 17 gram packet Take 17 g by mouth daily as needed.  cyanocobalamin (VITAMIN B-12) 100 mcg tablet Take 100 mcg by mouth daily.  EPOETIN BRYAN (PROCRIT IJ) 1,000 Units by Injection route every fourteen (14) days. Depending on blood count       omega-3 fatty acids-vitamin e (FISH OIL) 1,000 mg Cap Take 1 Tab by mouth daily.  cholecalciferol, vitamin d3, (VITAMIN D) 1,000 unit tablet Take 1,000 Units by mouth daily. Allergies   Allergen Reactions    Pcn [Penicillins] Swelling     tongue    Bactrim [Sulfamethoxazole-Trimethoprim] Diarrhea    Ceftin [Cefuroxime Axetil] Diarrhea    Hydralazine Other (comments)     edema    Sulfa (Sulfonamide Antibiotics) Itching         Past Medical History:   Diagnosis Date    Anemia NEC     Benign neoplasm of adrenal gland     Chronic anemia 6/1/2010    Constipation     on a daily stool softener which pt states helps as of 4/27/16    Diabetes ([address]     WALKER (dyspnea on exertion)     as of 4/27/16 pt states this is her baseline and is not getting any worse    GERD (gastroesophageal reflux disease)     Goiter 2011    as of 4/27/16 Pt states \"I am not even sure I have it\".   Pt denies any problems    Gout 6/1/2010    as of 4/27/16 pt denies any sx    HTN (hypertension) 6/1/2010    followed by cardiology Dr Jarred Duke  [phone]    Multiple myeloma, without mention of having achieved remission Dx 11/1/99    followed by David Wasserman    Pure hypercholesterolemia 2010    Sleep apnea 2010    no CPAP    Spinal stenosis 2010         Past Surgical History:   Procedure Laterality Date    COLONOSCOPY N/A 7/15/2019    flex sig , polypectomy performed by [facility] at [facility] AM[facility]    HX HYSTERECTOMY      HX ORTHOPAEDIC      left hand-trigger finger    HX ORTHOPAEDIC      right hand- trigger finger    MO COLONOSCOPY FLX DX W/COLLJ SPEC WHEN PFRMD  08/10/2010    Vicky Nayak         Family History   Problem Relation Age of Onset    Hypertension Mother     No Known Problems Father     Cancer Sister 52        colon     Diabetes Sister     Kidney Disease Sister     Hypertension Sister     Elevated Lipids Sister        Social History     Tobacco Use    Smoking status: Former Smoker     Quit date: 1980     Years since quittin.7    Smokeless tobacco: Never Used   Substance Use Topics    Alcohol use: No     Alcohol/week: 0.0 standard drinks        Lab Results   Component Value Date/Time    WBC 4.6 2021 11:20 AM    HGB 9.2 (L) 2021 11:20 AM    HCT 29.5 (L) 2021 11:20 AM    PLATELET 342  11:20 AM    MCV 94.6 2021 11:20 AM     Lab Results   Component Value Date/Time    Cholesterol, total 115 2021 11:20 AM    HDL Cholesterol 41 2021 11:20 AM    LDL, calculated 53.6 2021 11:20 AM    LDL-C, External 57 10/14/2015 12:00 AM    Triglyceride 102 2021 11:20 AM    CHOL/HDL Ratio 2.8 2021 11:20 AM     Lab Results   Component Value Date/Time    TSH 2.860 2011 09:29 AM      Lab Results   Component Value Date/Time    Sodium 140 2021 11:20 AM    Potassium 4.6 2021 11:20 AM    Chloride 116 (H) 2021 11:20 AM    CO2 20 (L) 2021 11:20 AM    Anion gap 4 (L) 2021 11:20 AM    Glucose 126 (H) 2021 11:20 AM    BUN 30 (H) 2021 11:20 AM    Creatinine 1.36 (H) 2021 11:20 AM    BUN/Creatinine ratio 22 (H) 2021 11:20 AM    GFR est AA 45 (L) 04/28/2021 11:20 AM    GFR est non-AA 37 (L) 04/28/2021 11:20 AM    Calcium 9.4 04/28/2021 11:20 AM    Bilirubin, total 0.3 04/28/2021 11:20 AM    ALT (SGPT) 15 04/28/2021 11:20 AM    Alk. phosphatase 56 04/28/2021 11:20 AM    Protein, total 8.6 (H) 04/28/2021 11:20 AM    Albumin 3.6 04/28/2021 11:20 AM    Globulin 5.0 (H) 04/28/2021 11:20 AM    A-G Ratio 0.7 (L) 04/28/2021 11:20 AM      Lab Results   Component Value Date/Time    Hemoglobin A1c 6.2 (H) 10/27/2020 12:27 PM    Hemoglobin A1c (POC) 6.0 04/28/2021 10:57 AM    Hemoglobin A1c, External 6.4 10/14/2015 12:00 AM         Review of Systems   Constitutional: Negative for malaise/fatigue. HENT: Negative for congestion. Eyes: Negative for blurred vision. Respiratory: Negative for cough and shortness of breath. Cardiovascular: Negative for chest pain, palpitations and leg swelling. Gastrointestinal: Negative for abdominal pain, constipation and heartburn. Genitourinary: Negative for dysuria, frequency and urgency. Musculoskeletal: Negative for back pain and joint pain. Neurological: Negative for dizziness, tingling and headaches. Endo/Heme/Allergies: Negative for environmental allergies. Psychiatric/Behavioral: Negative for depression. The patient does not have insomnia. Physical Exam  Vitals and nursing note reviewed. Constitutional:       Appearance: Normal appearance. She is well-developed. Comments: BP (!) 132/56 (BP 1 Location: Left arm, BP Patient Position: Sitting)   Pulse (!) 56   Temp 97.2 °F (36.2 °C) (Oral)   Resp 16   Ht 5' 1.5\" (1.562 m)   Wt 140 lb 9.6 oz (63.8 kg)   SpO2 98%   BMI 26.14 kg/m²    HENT:      Right Ear: Tympanic membrane and ear canal normal.      Left Ear: Tympanic membrane and ear canal normal.      Nose: No mucosal edema. Neck:      Thyroid: No thyromegaly. Cardiovascular:      Rate and Rhythm: Normal rate and regular rhythm. Heart sounds: Normal heart sounds. No gallop. Pulmonary:      Effort: Pulmonary effort is normal.      Breath sounds: Normal breath sounds. Abdominal:      General: Bowel sounds are normal.      Palpations: Abdomen is soft. There is no mass. Tenderness: There is no abdominal tenderness. Musculoskeletal:         General: Normal range of motion. Cervical back: Normal range of motion and neck supple. Right lower leg: No edema. Left lower leg: No edema. Lymphadenopathy:      Cervical: No cervical adenopathy. Skin:     General: Skin is warm and dry. Neurological:      General: No focal deficit present. Mental Status: She is alert and oriented to person, place, and time. Psychiatric:         Mood and Affect: Mood normal.         ASSESSMENT and PLAN  Diagnoses and all orders for this visit:    1. Essential hypertension  Stable     2. Type 2 diabetes mellitus without complication, without long-term current use of insulin (HCC)  Continue to monitor. Work on diet and exercise. -     AMB POC HEMOGLOBIN A1C  -     AMB POC GLUCOSE, QUANTITATIVE, BLOOD    3. Mixed hyperlipidemia  Continue to monitor. Work on diet and exercise.  -     LIPID PANEL; Future    4. Chronic anemia  Followed by hemtology     5. Primary osteoarthritis involving multiple joints  Stable     6. ELEAZAR (obstructive sleep apnea)  Not using CPAP and does not want to go back to it. Feels that she is sleeping ok. Feeling rested. 7. Encounter for medication monitoring  -     METABOLIC PANEL, COMPREHENSIVE; Future    8. Needs flu shot  -     FLU (FLUAD QUAD INFLUENZA VACCINE,QUAD,ADJUVANTED)      Follow-up and Dispositions    · Return in about 4 months (around 1/29/2022).        current treatment plan is effective, no change in therapy  reviewed diet, exercise and weight control  cardiovascular risk and specific lipid/LDL goals reviewed  reviewed medications and side effects in detail  specific diabetic recommendations: low cholesterol diet, weight control and daily exercise discussed, all medications, side effects and compliance discussed carefully, foot care discussed and Podiatry visits discussed, annual eye examinations at Ophthalmology discussed and glycohemoglobin and other lab monitoring discussed     I have discussed diagnosis listed in this note with pt and/or family. I have discussed treatment plans and options and the risk/benefit analysis of those options, including safe use of medications and possible medication side effects. Through the use of shared decision making we have agreed to the above plan. The patient has received an after-visit summary and questions were answered concerning future plans and follow up. Advise pt of any urgent changes then to proceed to the ER.

## 2021-12-14 DIAGNOSIS — E78.2 MIXED HYPERLIPIDEMIA: ICD-10-CM

## 2021-12-14 RX ORDER — ROSUVASTATIN CALCIUM 5 MG/1
TABLET, COATED ORAL
Qty: 90 TABLET | Refills: 3 | Status: SHIPPED | OUTPATIENT
Start: 2021-12-14

## 2021-12-30 ENCOUNTER — HOSPITAL ENCOUNTER (OUTPATIENT)
Dept: MAMMOGRAPHY | Age: 86
Discharge: HOME | End: 2021-12-30
Attending: FAMILY MEDICINE
Payer: MEDICARE

## 2021-12-30 DIAGNOSIS — Z12.31 ENCOUNTER FOR SCREENING MAMMOGRAM FOR BREAST CANCER: ICD-10-CM

## 2021-12-30 PROCEDURE — 77067 SCR MAMMO BI INCL CAD: CPT

## 2022-01-31 ENCOUNTER — OFFICE VISIT (OUTPATIENT)
Dept: FAMILY MEDICINE CLINIC | Age: 87
End: 2022-01-31
Payer: MEDICARE

## 2022-01-31 VITALS
WEIGHT: 139.6 LBS | TEMPERATURE: 97.8 F | SYSTOLIC BLOOD PRESSURE: 126 MMHG | HEART RATE: 64 BPM | HEIGHT: 62 IN | BODY MASS INDEX: 25.69 KG/M2 | DIASTOLIC BLOOD PRESSURE: 50 MMHG | RESPIRATION RATE: 16 BRPM | OXYGEN SATURATION: 96 %

## 2022-01-31 DIAGNOSIS — D64.9 CHRONIC ANEMIA: ICD-10-CM

## 2022-01-31 DIAGNOSIS — K29.50 OTHER CHRONIC GASTRITIS WITHOUT HEMORRHAGE: ICD-10-CM

## 2022-01-31 DIAGNOSIS — Z51.81 ENCOUNTER FOR MEDICATION MONITORING: ICD-10-CM

## 2022-01-31 DIAGNOSIS — I10 ESSENTIAL HYPERTENSION: Primary | ICD-10-CM

## 2022-01-31 DIAGNOSIS — M15.9 PRIMARY OSTEOARTHRITIS INVOLVING MULTIPLE JOINTS: ICD-10-CM

## 2022-01-31 DIAGNOSIS — E11.9 TYPE 2 DIABETES MELLITUS WITHOUT COMPLICATION, WITHOUT LONG-TERM CURRENT USE OF INSULIN (HCC): ICD-10-CM

## 2022-01-31 DIAGNOSIS — G47.33 OSA (OBSTRUCTIVE SLEEP APNEA): ICD-10-CM

## 2022-01-31 DIAGNOSIS — E78.2 MIXED HYPERLIPIDEMIA: ICD-10-CM

## 2022-01-31 LAB
ALBUMIN SERPL-MCNC: 3.5 G/DL (ref 3.5–5)
ALBUMIN/GLOB SERPL: 0.7 {RATIO} (ref 1.1–2.2)
ALP SERPL-CCNC: 55 U/L (ref 45–117)
ALT SERPL-CCNC: 14 U/L (ref 12–78)
ANION GAP SERPL CALC-SCNC: 4 MMOL/L (ref 5–15)
AST SERPL-CCNC: 10 U/L (ref 15–37)
BILIRUB SERPL-MCNC: 0.3 MG/DL (ref 0.2–1)
BUN SERPL-MCNC: 31 MG/DL (ref 6–20)
BUN/CREAT SERPL: 18 (ref 12–20)
CALCIUM SERPL-MCNC: 9.4 MG/DL (ref 8.5–10.1)
CHLORIDE SERPL-SCNC: 113 MMOL/L (ref 97–108)
CHOLEST SERPL-MCNC: 102 MG/DL
CO2 SERPL-SCNC: 22 MMOL/L (ref 21–32)
COMMENT, HOLDF: NORMAL
CREAT SERPL-MCNC: 1.74 MG/DL (ref 0.55–1.02)
ERYTHROCYTE [DISTWIDTH] IN BLOOD BY AUTOMATED COUNT: 16.2 % (ref 11.5–14.5)
GLOBULIN SER CALC-MCNC: 5.2 G/DL (ref 2–4)
GLUCOSE POC: 198 MG/DL
GLUCOSE SERPL-MCNC: 141 MG/DL (ref 65–100)
HCT VFR BLD AUTO: 29.5 % (ref 35–47)
HDLC SERPL-MCNC: 38 MG/DL
HDLC SERPL: 2.7 {RATIO} (ref 0–5)
HGB BLD-MCNC: 9.1 G/DL (ref 11.5–16)
LDLC SERPL CALC-MCNC: 46 MG/DL (ref 0–100)
MAGNESIUM SERPL-MCNC: 2 MG/DL (ref 1.6–2.4)
MCH RBC QN AUTO: 29.3 PG (ref 26–34)
MCHC RBC AUTO-ENTMCNC: 30.8 G/DL (ref 30–36.5)
MCV RBC AUTO: 94.9 FL (ref 80–99)
NRBC # BLD: 0 K/UL (ref 0–0.01)
NRBC BLD-RTO: 0 PER 100 WBC
PLATELET # BLD AUTO: 148 K/UL (ref 150–400)
PMV BLD AUTO: 11.5 FL (ref 8.9–12.9)
POTASSIUM SERPL-SCNC: 4.2 MMOL/L (ref 3.5–5.1)
PROT SERPL-MCNC: 8.7 G/DL (ref 6.4–8.2)
RBC # BLD AUTO: 3.11 M/UL (ref 3.8–5.2)
SAMPLES BEING HELD,HOLD: NORMAL
SODIUM SERPL-SCNC: 139 MMOL/L (ref 136–145)
TRIGL SERPL-MCNC: 90 MG/DL (ref ?–150)
VLDLC SERPL CALC-MCNC: 18 MG/DL
WBC # BLD AUTO: 4.1 K/UL (ref 3.6–11)

## 2022-01-31 PROCEDURE — G8427 DOCREV CUR MEDS BY ELIG CLIN: HCPCS | Performed by: FAMILY MEDICINE

## 2022-01-31 PROCEDURE — 1090F PRES/ABSN URINE INCON ASSESS: CPT | Performed by: FAMILY MEDICINE

## 2022-01-31 PROCEDURE — G0463 HOSPITAL OUTPT CLINIC VISIT: HCPCS | Performed by: FAMILY MEDICINE

## 2022-01-31 PROCEDURE — 99214 OFFICE O/P EST MOD 30 MIN: CPT | Performed by: FAMILY MEDICINE

## 2022-01-31 PROCEDURE — 1101F PT FALLS ASSESS-DOCD LE1/YR: CPT | Performed by: FAMILY MEDICINE

## 2022-01-31 PROCEDURE — G8510 SCR DEP NEG, NO PLAN REQD: HCPCS | Performed by: FAMILY MEDICINE

## 2022-01-31 PROCEDURE — G8419 CALC BMI OUT NRM PARAM NOF/U: HCPCS | Performed by: FAMILY MEDICINE

## 2022-01-31 PROCEDURE — 83036 HEMOGLOBIN GLYCOSYLATED A1C: CPT | Performed by: FAMILY MEDICINE

## 2022-01-31 PROCEDURE — 82947 ASSAY GLUCOSE BLOOD QUANT: CPT | Performed by: FAMILY MEDICINE

## 2022-01-31 PROCEDURE — G8536 NO DOC ELDER MAL SCRN: HCPCS | Performed by: FAMILY MEDICINE

## 2022-01-31 RX ORDER — PANTOPRAZOLE SODIUM 40 MG/1
40 TABLET, DELAYED RELEASE ORAL DAILY
Qty: 30 TABLET | Refills: 3 | Status: SHIPPED | OUTPATIENT
Start: 2022-01-31 | End: 2022-10-12 | Stop reason: ALTCHOICE

## 2022-01-31 NOTE — PROGRESS NOTES
Chief Complaint   Patient presents with    Follow-up     4m fu        1. Have you been to the ER, urgent care clinic since your last visit? Hospitalized since your last visit? No    2. Have you seen or consulted any other health care providers outside of the [address] [place] [address] since your last visit? Include any pap smears or colon screening.  No

## 2022-01-31 NOTE — PROGRESS NOTES
HISTORY OF PRESENT ILLNESS  Leroy Camilo is a 80 y.o. female. HPI   Follow up on chronic medical problems. Overall feeling well. Doing the precautionary measures at home to reduce risks of exposure COVID19. Also wearing mask when she is going out. No known sick contacts or known exposure to Jesika Mckeon. C/o \"pain in her stomach\" that comes and goes over the past 2 to 3 months. Usually triggered by eating. Denies any excessive foods with acid or caffeine. Feeling like a gas pain but sometimes pain comes on with just drinking water. Describes as an aching type pain. No heart burn or reflux. Last for a hour or 2 and then subsides. No nausea or vomiting. BMs her normal.  No blood in the stool and no melena. Cardiovascular Review:  The patient has hypertension, ELEAZAR (but has not been using the CPAP) and hyperlipidemia. Denies chest pain or chest tightness. SOB is at her usual.  No cough or congestion noted. Diet and Lifestyle: generally follows a low fat low cholesterol diet, generally follows a low sodium diet, follows a diabetic diet regularly  Home BP Monitoring: is well controlled at home, ranging 130s's/70-80's. Pertinent ROS: taking medications as instructed, no medication side effects noted, no TIA's, no chest pain on exertion, no swelling of ankles. Diabetes Mellitus:  She has diabetes mellitus. Diabetic ROS - diabetic diet compliance: compliant most of the time, home glucose monitoring: is performed regularly, fasting values range low 100s,  Pt does not have BS log with her today, further diabetic ROS: no polyuria or polydipsia, no chest pain, dyspnea or TIA's, no numbness, tingling or pain in extremities, no unusual visual symptoms, no hypoglycemia. Her appetite is good. Lab review: orders written for new lab studies as appropriate; see orders. Chronic anemia and multiple myeloma in remission is being followed by hematology. Osteoarthritis:  Patient has osteoarthritis. She also had know spinal stenosis. She has been having pains in several joints that migrate and comes and goes. Overall the pain in the joints and lower back has been doing good with minimal pain. Symptoms onset: problem is longstanding. Rheumatological ROS: stable, mild-to-moderate joint symptoms intermittently, reasonably well controlled by PRN meds. Response to treatment plan: stable and intermittent. HM:  Microalbumin 4/28/2021  Mammogram 12/29/2020  Colonoscopy 7/15/2019 by  Kem Gonzales and was told she did not need another unless she had a problem. Eye exam was 1/27/2021 by Dr. Ruth Skinner. 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    Essential hypertension, malignant I10    Mixed hyperlipidemia E78.2    ELEAZAR on CPAP G47.33, Z99.89    Encounter for medication monitoring Z51.81    Type 2 diabetes mellitus without complication (HCC) L82.6    Type 2 diabetes with nephropathy ([facility] [address] E11.21    WALKER (dyspnea on exertion) R06.00       Current Outpatient Medications   Medication Sig Dispense Refill    rosuvastatin (CRESTOR) 5 mg tablet TAKE 1 TABLET BY MOUTH  NIGHTLY 90 Tablet 3    cloNIDine HCL (CATAPRES) 0.3 mg tablet Take 0.3 mg by mouth nightly.  amLODIPine (NORVASC) 10 mg tablet TAKE 1 TABLET BY MOUTH ONCE DAILY 90 Tablet 3    spironolactone (ALDACTONE) 25 mg tablet TAKE 1 TABLET BY MOUTH  TWICE DAILY 180 Tablet 3    metoprolol tartrate (LOPRESSOR) 100 mg IR tablet TAKE 1 TABLET BY MOUTH  TWICE DAILY 180 Tablet 3    chlorthalidone (HYGROTON) 25 mg tablet TAKE 1 TABLET BY MOUTH  TWICE DAILY 180 Tablet 3    allopurinoL (ZYLOPRIM) 100 mg tablet TAKE 1 TABLET BY MOUTH  DAILY 90 Tablet 3    metFORMIN ER (GLUCOPHAGE XR) 500 mg tablet Take 1 Tab by mouth daily (with dinner).  90 Tab 3    minoxidiL (LONITEN) 10 mg tablet TAKE ONE-HALF TABLET BY  MOUTH DAILY 45 Tab 3    mupirocin calcium (BACTROBAN) 2 % topical cream Apply  to affected area two (2) times daily as needed (skin irritation). 30 g 1    meclizine (ANTIVERT) 12.5 mg tablet Take 1 Tab by mouth three (3) times daily as needed. Take as needed for dizziness. 30 Tab 0    ondansetron (ZOFRAN ODT) 4 mg disintegrating tablet Take 4 mg by mouth every eight (8) hours as needed for Nausea.  fluticasone (FLONASE) 50 mcg/actuation nasal spray 2 Sprays by Both Nostrils route daily as needed.  polyethylene glycol (MIRALAX) 17 gram packet Take 17 g by mouth daily as needed.  cyanocobalamin (VITAMIN B-12) 100 mcg tablet Take 100 mcg by mouth daily.  EPOETIN BRYAN (PROCRIT IJ) 1,000 Units by Injection route every fourteen (14) days. Depending on blood count       omega-3 fatty acids-vitamin e (FISH OIL) 1,000 mg Cap Take 1 Tab by mouth daily.  cholecalciferol, vitamin d3, (VITAMIN D) 1,000 unit tablet Take 1,000 Units by mouth daily. Allergies   Allergen Reactions    Pcn [Penicillins] Swelling     tongue    Bactrim [Sulfamethoxazole-Trimethoprim] Diarrhea    Ceftin [Cefuroxime Axetil] Diarrhea    Hydralazine Other (comments)     edema    Sulfa (Sulfonamide Antibiotics) Itching       Past Medical History:   Diagnosis Date    Anemia NEC     Benign neoplasm of adrenal gland     Chronic anemia 6/1/2010    Constipation     on a daily stool softener which pt states helps as of 4/27/16    Diabetes ([address]     WALKER (dyspnea on exertion)     as of 4/27/16 pt states this is her baseline and is not getting any worse    GERD (gastroesophageal reflux disease)     Goiter 2011    as of 4/27/16 Pt states \"I am not even sure I have it\".   Pt denies any problems    Gout 6/1/2010    as of 4/27/16 pt denies any sx    HTN (hypertension) 6/1/2010    followed by cardiology Dr Alex Sargent  [phone]    Multiple myeloma, without mention of having achieved remission Dx 99    followed by Devang Cueto    Pure hypercholesterolemia 2010    Sleep apnea 2010    no CPAP    Spinal stenosis 2010       Past Surgical History:   Procedure Laterality Date    COLONOSCOPY N/A 7/15/2019    flex sig , polypectomy performed by Qasim Rueda [facility] at [facility]    HX HYSTERECTOMY  1970s    HX ORTHOPAEDIC      left hand-trigger finger    HX ORTHOPAEDIC      right hand- trigger finger    ME COLONOSCOPY FLX DX W/COLLJ SPEC WHEN PFRMD  08[place]10/2010    [place] [facility]       Family History   Problem Relation Age of Onset    Hypertension Mother     No Known Problems Father     Cancer Sister 52        colon     Diabetes Sister     Kidney Disease Sister     Hypertension Sister     Elevated Lipids Sister        Social History     Tobacco Use    Smoking status: Former Smoker     Quit date: 1980     Years since quittin.1    Smokeless tobacco: Never Used   Substance Use Topics    Alcohol use: No     Alcohol/week: 0.0 standard drinks        Lab Results   Component Value Date/Time    WBC 4.4 2021 11:06 AM    HGB 10.1 (L) 2021 11:06 AM    HCT 32.9 (L) 2021 11:06 AM    PLATELET 305 (L)  11:06 AM    MCV 97.1 2021 11:06 AM     Lab Results   Component Value Date/Time    Cholesterol, total 97 2021 11:06 AM    HDL Cholesterol 41 2021 11:06 AM    LDL, calculated 37.2 2021 11:06 AM    LDL-C, External 57 10/14/2015 12:00 AM    Triglyceride 94 2021 11:06 AM    CHOL/HDL Ratio 2.4 2021 11:06 AM     Lab Results   Component Value Date/Time    TSH 2.860 2011 09:29 AM      Lab Results   Component Value Date/Time    Sodium 139 2021 11:06 AM    Potassium 5.1 2021 11:06 AM    Chloride 114 (H) 2021 11:06 AM    CO2 20 (L) 2021 11:06 AM    Anion gap 5 2021 11:06 AM    Glucose 92 2021 11:06 AM    BUN 31 (H) 2021 11:06 AM    Creatinine 1.45 (H) 09/29/2021 11:06 AM    BUN/Creatinine ratio 21 (H) 09/29/2021 11:06 AM    GFR est AA 42 (L) 09/29/2021 11:06 AM    GFR est non-AA 34 (L) 09/29/2021 11:06 AM    Calcium 9.4 09/29/2021 11:06 AM    Bilirubin, total 0.3 09/29/2021 11:06 AM    ALT (SGPT) 16 09/29/2021 11:06 AM    Alk. phosphatase 59 09/29/2021 11:06 AM    Protein, total 8.9 (H) 09/29/2021 11:06 AM    Albumin 3.4 (L) 09/29/2021 11:06 AM    Globulin 5.5 (H) 09/29/2021 11:06 AM    A-G Ratio 0.6 (L) 09/29/2021 11:06 AM      Lab Results   Component Value Date/Time    Hemoglobin A1c 6.2 (H) 10/27/2020 12:27 PM    Hemoglobin A1c (POC) 5.8 09/29/2021 10:41 AM    Hemoglobin A1c, External 6.4 10/14/2015 12:00 AM         Review of Systems   Constitutional: Negative for malaise/fatigue. HENT: Negative for congestion. Eyes: Negative for blurred vision. Respiratory: Negative for cough and shortness of breath. Cardiovascular: Negative for chest pain, palpitations and leg swelling. Gastrointestinal: Negative for abdominal pain, constipation and heartburn. Genitourinary: Negative for dysuria, frequency and urgency. Musculoskeletal: Negative for back pain and joint pain. Neurological: Negative for dizziness, tingling and headaches. Endo/Heme/Allergies: Negative for environmental allergies. Psychiatric/Behavioral: Negative for depression. The patient does not have insomnia. Physical Exam  Vitals and nursing note reviewed. Constitutional:       Appearance: Normal appearance. She is well-developed. Comments: BP (!) 126/50   Pulse 64   Temp 97.8 °F (36.6 °C) (Oral)   Resp 16   Ht 5' 1.5\" (1.562 m)   Wt 139 lb 9.6 oz (63.3 kg)   SpO2 96%   BMI 25.95 kg/m²    HENT:      Right Ear: Tympanic membrane and ear canal normal.      Left Ear: Tympanic membrane and ear canal normal.      Nose: No mucosal edema. Neck:      Thyroid: No thyromegaly. Cardiovascular:      Rate and Rhythm: Normal rate and regular rhythm.       Heart sounds: Normal heart sounds. No gallop. Pulmonary:      Effort: Pulmonary effort is normal.      Breath sounds: Normal breath sounds. Abdominal:      General: Bowel sounds are normal.      Palpations: Abdomen is soft. There is no mass. Tenderness: There is no abdominal tenderness. Musculoskeletal:         General: No swelling. Normal range of motion. Cervical back: Normal range of motion and neck supple. Right lower leg: No edema. Left lower leg: No edema. Lymphadenopathy:      Cervical: No cervical adenopathy. Skin:     General: Skin is warm and dry. Neurological:      General: No focal deficit present. Mental Status: She is alert and oriented to person, place, and time. Psychiatric:         Mood and Affect: Mood normal.         ASSESSMENT and PLAN  Diagnoses and all orders for this visit:    1. Essential hypertension  Stable     2. Type 2 diabetes mellitus without complication, without long-term current use of insulin (HCC)  a1c stable at 5.6%. BS is 198 but she has eaten this morning and had cereal.    -     AMB POC HEMOGLOBIN A1C  -     AMB POC GLUCOSE, QUANTITATIVE, BLOOD    3. Mixed hyperlipidemia  Continue to monitor. Work on diet and exercise.  -     LIPID PANEL; Future    4. Other chronic gastritis without hemorrhage  -     pantoprazole (PROTONIX) 40 mg tablet; Take 1 Tablet by mouth daily. Take prior to breakfast  Discussed foods to avoid that could trigger sx.      5. Chronic anemia  -     CBC W/O DIFF; Future    6. ELEAZAR (obstructive sleep apnea)  Stable     7. Primary osteoarthritis involving multiple joints  Overall stable. 8. Encounter for medication monitoring  -     METABOLIC PANEL, COMPREHENSIVE; Future  -     MAGNESIUM; Future      Follow-up and Dispositions    · Return in about 6 weeks (around 3/14/2022).        reviewed diet, exercise and weight control  cardiovascular risk and specific lipid/LDL goals reviewed  reviewed medications and side effects in detail  specific diabetic recommendations: low cholesterol diet, weight control and daily exercise discussed and glycohemoglobin and other lab monitoring discussed     I have discussed diagnosis listed in this note with pt and/or family. I have discussed treatment plans and options and the risk/benefit analysis of those options, including safe use of medications and possible medication side effects. Through the use of shared decision making we have agreed to the above plan. The patient has received an after-visit summary and questions were answered concerning future plans and follow up. Advise pt of any urgent changes then to proceed to the ER.

## 2022-02-03 ENCOUNTER — TELEPHONE (OUTPATIENT)
Dept: FAMILY MEDICINE CLINIC | Age: 87
End: 2022-02-03

## 2022-02-03 NOTE — TELEPHONE ENCOUNTER
Hg is low but she is still seeing hematology and getting procrit injection every 2 weeks for her blood count

## 2022-02-16 DIAGNOSIS — E11.9 TYPE 2 DIABETES MELLITUS WITHOUT COMPLICATION, WITHOUT LONG-TERM CURRENT USE OF INSULIN (HCC): ICD-10-CM

## 2022-02-16 DIAGNOSIS — I10 ESSENTIAL HYPERTENSION: ICD-10-CM

## 2022-02-17 RX ORDER — MINOXIDIL 10 MG/1
TABLET ORAL
Qty: 45 TABLET | Refills: 3 | Status: SHIPPED | OUTPATIENT
Start: 2022-02-17

## 2022-02-17 RX ORDER — METFORMIN HYDROCHLORIDE 500 MG/1
TABLET, EXTENDED RELEASE ORAL
Qty: 90 TABLET | Refills: 3 | Status: SHIPPED | OUTPATIENT
Start: 2022-02-17

## 2022-03-14 ENCOUNTER — OFFICE VISIT (OUTPATIENT)
Dept: FAMILY MEDICINE CLINIC | Age: 87
End: 2022-03-14
Payer: MEDICARE

## 2022-03-14 VITALS
BODY MASS INDEX: 25.65 KG/M2 | RESPIRATION RATE: 16 BRPM | HEART RATE: 56 BPM | OXYGEN SATURATION: 96 % | TEMPERATURE: 98.2 F | WEIGHT: 139.4 LBS | HEIGHT: 62 IN | DIASTOLIC BLOOD PRESSURE: 60 MMHG | SYSTOLIC BLOOD PRESSURE: 152 MMHG

## 2022-03-14 DIAGNOSIS — Z51.81 ENCOUNTER FOR MEDICATION MONITORING: ICD-10-CM

## 2022-03-14 DIAGNOSIS — D64.9 ANEMIA, UNSPECIFIED TYPE: ICD-10-CM

## 2022-03-14 DIAGNOSIS — K29.50 OTHER CHRONIC GASTRITIS WITHOUT HEMORRHAGE: Primary | ICD-10-CM

## 2022-03-14 DIAGNOSIS — N18.32 STAGE 3B CHRONIC KIDNEY DISEASE (HCC): ICD-10-CM

## 2022-03-14 LAB — HBA1C MFR BLD HPLC: 5.6 %

## 2022-03-14 PROCEDURE — G8427 DOCREV CUR MEDS BY ELIG CLIN: HCPCS | Performed by: FAMILY MEDICINE

## 2022-03-14 PROCEDURE — 99213 OFFICE O/P EST LOW 20 MIN: CPT | Performed by: FAMILY MEDICINE

## 2022-03-14 PROCEDURE — 1090F PRES/ABSN URINE INCON ASSESS: CPT | Performed by: FAMILY MEDICINE

## 2022-03-14 PROCEDURE — G8536 NO DOC ELDER MAL SCRN: HCPCS | Performed by: FAMILY MEDICINE

## 2022-03-14 PROCEDURE — G8419 CALC BMI OUT NRM PARAM NOF/U: HCPCS | Performed by: FAMILY MEDICINE

## 2022-03-14 PROCEDURE — G0463 HOSPITAL OUTPT CLINIC VISIT: HCPCS | Performed by: FAMILY MEDICINE

## 2022-03-14 PROCEDURE — 1101F PT FALLS ASSESS-DOCD LE1/YR: CPT | Performed by: FAMILY MEDICINE

## 2022-03-14 PROCEDURE — G8510 SCR DEP NEG, NO PLAN REQD: HCPCS | Performed by: FAMILY MEDICINE

## 2022-03-14 NOTE — PROGRESS NOTES
Chief Complaint   Patient presents with    Hypertension     follow up    Cholesterol Problem     follow up    Diabetes     follow up             Microalbumin 4/28/20    Mammogram 12/30/2021    Bone density 5/19/2014    Colonoscopy 7/15/2019 by  James Rush and was told she did not need another unless she had a problem. Eye exam was 1/27/2021 by Dr. Fabrizio Armenta. Patient states she has an eye exam next month at [facility]. 1. Have you been to the ER, urgent care clinic since your last visit? Hospitalized since your last visit? no    2. Have you seen or consulted any other health care providers outside of the [address] [place] [address] since your last visit? Include any pap smears or colon screening.  no

## 2022-03-14 NOTE — PROGRESS NOTES
HISTORY OF PRESENT ILLNESS  Ryan Nino is a 80 y.o. female. Follow up on chronic anemia and CKD and abd pain from her last OV. Follow up c/o \"pain in her stomach\" that comes and goes that she was having over the past 2 to 3 months has improved. She has been taking the Protonix which seemed to help. She is no longer taking it everyday. No heart burn or reflux. No nausea or vomiting. BMs her normal.  No blood in the stool and no melena. Her blood count is being monitored by hematology. She is getting procrit injections every 2 weeks. Had her GI sx not improved then plan was for her to also have GI workup. Her elevated creatinine she thinks Is related to her not drinking enough fluids previously but she has increased her water intake. 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    Essential hypertension, malignant I10    Mixed hyperlipidemia E78.2    ELEAZAR on CPAP G47.33, Z99.89    Encounter for medication monitoring Z51.81    Type 2 diabetes mellitus without complication (HCC) A13.7    Type 2 diabetes with nephropathy (HCC) E11.21    WALKER (dyspnea on exertion) R06.00       Current Outpatient Medications   Medication Sig Dispense Refill    metFORMIN ER (GLUCOPHAGE XR) 500 mg tablet TAKE 1 TABLET BY MOUTH  DAILY WITH DINNER 90 Tablet 3    minoxidiL (LONITEN) 10 mg tablet TAKE ONE-HALF TABLET BY  MOUTH DAILY 45 Tablet 3    rosuvastatin (CRESTOR) 5 mg tablet TAKE 1 TABLET BY MOUTH  NIGHTLY 90 Tablet 3    cloNIDine HCL (CATAPRES) 0.3 mg tablet Take 0.3 mg by mouth nightly.       amLODIPine (NORVASC) 10 mg tablet TAKE 1 TABLET BY MOUTH ONCE DAILY 90 Tablet 3    spironolactone (ALDACTONE) 25 mg tablet TAKE 1 TABLET BY MOUTH  TWICE DAILY 180 Tablet 3    metoprolol tartrate (LOPRESSOR) 100 mg IR tablet TAKE 1 TABLET BY MOUTH  TWICE DAILY 180 Tablet 3    chlorthalidone (HYGROTON) 25 mg tablet TAKE 1 TABLET BY MOUTH  TWICE DAILY 180 Tablet 3    allopurinoL (ZYLOPRIM) 100 mg tablet TAKE 1 TABLET BY MOUTH  DAILY 90 Tablet 3    mupirocin calcium (BACTROBAN) 2 % topical cream Apply  to affected area two (2) times daily as needed (skin irritation). 30 g 1    meclizine (ANTIVERT) 12.5 mg tablet Take 1 Tab by mouth three (3) times daily as needed. Take as needed for dizziness. 30 Tab 0    ondansetron (ZOFRAN ODT) 4 mg disintegrating tablet Take 4 mg by mouth every eight (8) hours as needed for Nausea.  fluticasone (FLONASE) 50 mcg/actuation nasal spray 2 Sprays by Both Nostrils route daily as needed.  polyethylene glycol (MIRALAX) 17 gram packet Take 17 g by mouth daily as needed.  cyanocobalamin (VITAMIN B-12) 100 mcg tablet Take 100 mcg by mouth daily.  EPOETIN BRYAN (PROCRIT IJ) 1,000 Units by Injection route every fourteen (14) days. Depending on blood count       omega-3 fatty acids-vitamin e (FISH OIL) 1,000 mg Cap Take 1 Tab by mouth daily.  cholecalciferol, vitamin d3, (VITAMIN D) 1,000 unit tablet Take 1,000 Units by mouth daily.  pantoprazole (PROTONIX) 40 mg tablet Take 1 Tablet by mouth daily.  Take prior to breakfast (Patient not taking: Reported on 3/14/2022) 30 Tablet 3       Allergies   Allergen Reactions    Pcn [Penicillins] Swelling     tongue    Bactrim [Sulfamethoxazole-Trimethoprim] Diarrhea    Ceftin [Cefuroxime Axetil] Diarrhea    Hydralazine Other (comments)     edema    Sulfa (Sulfonamide Antibiotics) Itching         Past Medical History:   Diagnosis Date    Anemia NEC     Benign neoplasm of adrenal gland     Chronic anemia 6/1/2010    Constipation     on a daily stool softener which pt states helps as of 4/27/16    Diabetes ([address]     WALKER (dyspnea on exertion)     as of 4/27/16 pt states this is her baseline and is not getting any worse    GERD (gastroesophageal reflux disease)     Goiter     as of 16 Pt states \"I am not even sure I have it\".   Pt denies any problems    Gout 2010    as of 16 pt denies any sx    HTN (hypertension) 2010    followed by cardiology Dr Melvia Peabody  [phone]    Multiple myeloma, without mention of having achieved remission Dx 99    followed by Heladio Larios    Pure hypercholesterolemia 2010    Sleep apnea 2010    no CPAP    Spinal stenosis 2010         Past Surgical History:   Procedure Laterality Date    COLONOSCOPY N/A 7/15/2019    flex sig , polypectomy performed [facility] Lieutenant Jeffery [facility]    HX HYSTERECTOMY  1970s    HX ORTHOPAEDIC      left hand-trigger finger    HX ORTHOPAEDIC      right hand- trigger finger    OH COLONOSCOPY FLX DX W/COLLJ SPEC WHEN PFRMD  08/10/2010    Seferino Dominic         Family History   Problem Relation Age of Onset    Hypertension Mother     No Known Problems Father     Cancer Sister 52        colon     Diabetes Sister     Kidney Disease Sister     Hypertension Sister     Elevated Lipids Sister        Social History     Tobacco Use    Smoking status: Former Smoker     Quit date: 1980     Years since quittin.2    Smokeless tobacco: Never Used   Substance Use Topics    Alcohol use: No     Alcohol/week: 0.0 standard drinks        Lab Results   Component Value Date/Time    WBC 4.1 2022 08:19 AM    HGB 9.1 (L) 2022 08:19 AM    HCT 29.5 (L) 2022 08:19 AM    PLATELET 927 (L)  08:19 AM    MCV 94.9 2022 08:19 AM     Lab Results   Component Value Date/Time    Cholesterol, total 102 2022 08:19 AM    HDL Cholesterol 38 2022 08:19 AM    LDL, calculated 46 2022 08:19 AM    LDL-C, External 57 10/14/2015 12:00 AM    Triglyceride 90 2022 08:19 AM    CHOL/HDL Ratio 2.7 2022 08:19 AM     Lab Results   Component Value Date/Time    TSH 2.860 05/11/2011 09:29 AM      Lab Results   Component Value Date/Time    Sodium 139 01/31/2022 08:19 AM    Potassium 4.2 01/31/2022 08:19 AM    Chloride 113 (H) 01/31/2022 08:19 AM    CO2 22 01/31/2022 08:19 AM    Anion gap 4 (L) 01/31/2022 08:19 AM    Glucose 141 (H) 01/31/2022 08:19 AM    BUN 31 (H) 01/31/2022 08:19 AM    Creatinine 1.74 (H) 01/31/2022 08:19 AM    BUN/Creatinine ratio 18 01/31/2022 08:19 AM    GFR est AA 34 (L) 01/31/2022 08:19 AM    GFR est non-AA 28 (L) 01/31/2022 08:19 AM    Calcium 9.4 01/31/2022 08:19 AM    Bilirubin, total 0.3 01/31/2022 08:19 AM    ALT (SGPT) 14 01/31/2022 08:19 AM    Alk. phosphatase 55 01/31/2022 08:19 AM    Protein, total 8.7 (H) 01/31/2022 08:19 AM    Albumin 3.5 01/31/2022 08:19 AM    Globulin 5.2 (H) 01/31/2022 08:19 AM    A-G Ratio 0.7 (L) 01/31/2022 08:19 AM      Lab Results   Component Value Date/Time    Hemoglobin A1c 6.2 (H) 10/27/2020 12:27 PM    Hemoglobin A1c (POC) 5.8 09/29/2021 10:41 AM    Hemoglobin A1c, External 6.4 10/14/2015 12:00 AM         Review of Systems   Constitutional: Negative for malaise/fatigue. HENT: Negative for congestion. Eyes: Negative for blurred vision. Respiratory: Negative for cough and shortness of breath. Cardiovascular: Negative for chest pain, palpitations and leg swelling. Gastrointestinal: Negative for abdominal pain, constipation and heartburn. Genitourinary: Negative for dysuria, frequency and urgency. Musculoskeletal: Negative for back pain and joint pain. Neurological: Negative for dizziness, tingling and headaches. Endo/Heme/Allergies: Negative for environmental allergies. Psychiatric/Behavioral: Negative for depression. The patient does not have insomnia. Physical Exam  Vitals and nursing note reviewed. Constitutional:       Appearance: Normal appearance. She is well-developed.       Comments: BP (!) 152/60   Pulse (!) 56   Temp 98.2 °F (36.8 °C) (Oral)   Resp 16   Ht 5' 1.5\" (1.562 m)   Wt 139 lb 6.4 oz (63.2 kg)   SpO2 96%   BMI 25.91 kg/m²    Neck:      Thyroid: No thyromegaly. Cardiovascular:      Rate and Rhythm: Normal rate and regular rhythm. Heart sounds: Normal heart sounds. No gallop. Pulmonary:      Effort: Pulmonary effort is normal.      Breath sounds: Normal breath sounds. Abdominal:      General: Abdomen is flat. Bowel sounds are normal. There is no distension. Palpations: Abdomen is soft. There is no mass. Tenderness: There is no abdominal tenderness. Hernia: No hernia is present. Musculoskeletal:      Right lower leg: No edema. Left lower leg: No edema. Neurological:      Mental Status: She is alert. ASSESSMENT and PLAN  Diagnoses and all orders for this visit:    1. Other chronic gastritis without hemorrhage  Continue with protonix. Sx have improve. Will monitor. 2. Anemia, unspecified type  Follow up as per hematology. Will defer GI work up for now. -     CBC W/O DIFF; Future  -     FERRITIN; Future  -     VITAMIN B12 & FOLATE; Future    3. Stage 3b chronic kidney disease (HCC)  Increase fluids.    -     METABOLIC PANEL, BASIC; Future    4. Encounter for medication monitoring  -     METABOLIC PANEL, BASIC; Future      Follow-up and Dispositions    · Return in about 1 month (around 4/14/2022). reviewed diet, exercise and weight control  reviewed medications and side effects in detail    I have discussed diagnosis listed in this note with pt and/or family. I have discussed treatment plans and options and the risk/benefit analysis of those options, including safe use of medications and possible medication side effects. Through the use of shared decision making we have agreed to the above plan. The patient has received an after-visit summary and questions were answered concerning future plans and follow up. Advise pt of any urgent changes then to proceed to the ER.

## 2022-03-15 LAB
ANION GAP SERPL CALC-SCNC: 3 MMOL/L (ref 5–15)
BUN SERPL-MCNC: 29 MG/DL (ref 6–20)
BUN/CREAT SERPL: 19 (ref 12–20)
CALCIUM SERPL-MCNC: 9.6 MG/DL (ref 8.5–10.1)
CHLORIDE SERPL-SCNC: 112 MMOL/L (ref 97–108)
CO2 SERPL-SCNC: 23 MMOL/L (ref 21–32)
CREAT SERPL-MCNC: 1.54 MG/DL (ref 0.55–1.02)
ERYTHROCYTE [DISTWIDTH] IN BLOOD BY AUTOMATED COUNT: 16 % (ref 11.5–14.5)
FERRITIN SERPL-MCNC: 165 NG/ML (ref 26–388)
FOLATE SERPL-MCNC: 11.2 NG/ML (ref 5–21)
GLUCOSE SERPL-MCNC: 99 MG/DL (ref 65–100)
HCT VFR BLD AUTO: 31.8 % (ref 35–47)
HGB BLD-MCNC: 10 G/DL (ref 11.5–16)
MCH RBC QN AUTO: 29.7 PG (ref 26–34)
MCHC RBC AUTO-ENTMCNC: 31.4 G/DL (ref 30–36.5)
MCV RBC AUTO: 94.4 FL (ref 80–99)
NRBC # BLD: 0.04 K/UL (ref 0–0.01)
NRBC BLD-RTO: 1 PER 100 WBC
PLATELET # BLD AUTO: 154 K/UL (ref 150–400)
PMV BLD AUTO: 11.7 FL (ref 8.9–12.9)
POTASSIUM SERPL-SCNC: 4.8 MMOL/L (ref 3.5–5.1)
RBC # BLD AUTO: 3.37 M/UL (ref 3.8–5.2)
SODIUM SERPL-SCNC: 138 MMOL/L (ref 136–145)
VIT B12 SERPL-MCNC: >2000 PG/ML (ref 193–986)
WBC # BLD AUTO: 4 K/UL (ref 3.6–11)

## 2022-03-17 ENCOUNTER — TELEPHONE (OUTPATIENT)
Dept: FAMILY MEDICINE CLINIC | Age: 87
End: 2022-03-17

## 2022-03-17 DIAGNOSIS — N18.32 STAGE 3B CHRONIC KIDNEY DISEASE (HCC): Primary | ICD-10-CM

## 2022-03-18 PROBLEM — R06.09 DOE (DYSPNEA ON EXERTION): Status: ACTIVE | Noted: 2018-11-15

## 2022-03-18 PROBLEM — E11.21 TYPE 2 DIABETES WITH NEPHROPATHY (HCC): Status: ACTIVE | Noted: 2018-11-15

## 2022-04-13 ENCOUNTER — OFFICE VISIT (OUTPATIENT)
Dept: FAMILY MEDICINE CLINIC | Age: 87
End: 2022-04-13
Payer: MEDICARE

## 2022-04-13 VITALS
HEIGHT: 61 IN | TEMPERATURE: 97.3 F | SYSTOLIC BLOOD PRESSURE: 134 MMHG | DIASTOLIC BLOOD PRESSURE: 48 MMHG | RESPIRATION RATE: 16 BRPM | HEART RATE: 82 BPM | BODY MASS INDEX: 26.32 KG/M2 | OXYGEN SATURATION: 99 % | WEIGHT: 139.4 LBS

## 2022-04-13 DIAGNOSIS — R13.10 DYSPHAGIA, UNSPECIFIED TYPE: ICD-10-CM

## 2022-04-13 DIAGNOSIS — N18.32 STAGE 3B CHRONIC KIDNEY DISEASE (HCC): ICD-10-CM

## 2022-04-13 DIAGNOSIS — E11.9 TYPE 2 DIABETES MELLITUS WITHOUT COMPLICATION, WITHOUT LONG-TERM CURRENT USE OF INSULIN (HCC): ICD-10-CM

## 2022-04-13 DIAGNOSIS — K59.04 CHRONIC IDIOPATHIC CONSTIPATION: ICD-10-CM

## 2022-04-13 DIAGNOSIS — I10 ESSENTIAL HYPERTENSION: Primary | ICD-10-CM

## 2022-04-13 DIAGNOSIS — D64.9 CHRONIC ANEMIA: ICD-10-CM

## 2022-04-13 DIAGNOSIS — Z51.81 ENCOUNTER FOR MEDICATION MONITORING: ICD-10-CM

## 2022-04-13 DIAGNOSIS — K21.9 GASTROESOPHAGEAL REFLUX DISEASE, UNSPECIFIED WHETHER ESOPHAGITIS PRESENT: ICD-10-CM

## 2022-04-13 DIAGNOSIS — E78.2 MIXED HYPERLIPIDEMIA: ICD-10-CM

## 2022-04-13 PROCEDURE — 3044F HG A1C LEVEL LT 7.0%: CPT | Performed by: FAMILY MEDICINE

## 2022-04-13 PROCEDURE — 1090F PRES/ABSN URINE INCON ASSESS: CPT | Performed by: FAMILY MEDICINE

## 2022-04-13 PROCEDURE — G0463 HOSPITAL OUTPT CLINIC VISIT: HCPCS | Performed by: FAMILY MEDICINE

## 2022-04-13 PROCEDURE — 1101F PT FALLS ASSESS-DOCD LE1/YR: CPT | Performed by: FAMILY MEDICINE

## 2022-04-13 PROCEDURE — 99214 OFFICE O/P EST MOD 30 MIN: CPT | Performed by: FAMILY MEDICINE

## 2022-04-13 PROCEDURE — G8536 NO DOC ELDER MAL SCRN: HCPCS | Performed by: FAMILY MEDICINE

## 2022-04-13 PROCEDURE — G8427 DOCREV CUR MEDS BY ELIG CLIN: HCPCS | Performed by: FAMILY MEDICINE

## 2022-04-13 PROCEDURE — G8510 SCR DEP NEG, NO PLAN REQD: HCPCS | Performed by: FAMILY MEDICINE

## 2022-04-13 PROCEDURE — G8419 CALC BMI OUT NRM PARAM NOF/U: HCPCS | Performed by: FAMILY MEDICINE

## 2022-04-13 RX ORDER — FAMOTIDINE 40 MG/1
40 TABLET, FILM COATED ORAL DAILY
Status: CANCELLED | OUTPATIENT
Start: 2022-04-13

## 2022-04-13 NOTE — PROGRESS NOTES
HISTORY OF PRESENT ILLNESS  Genevieve Honeycutt is a 80 y.o. female. HPI   Follow up on chronic medical problems. Overall feeling well. Doing the precautionary measures at home to reduce risks of exposure COVID19. Also wearing mask when she is going out. No known sick contacts or known exposure to [address]. C/o \"pain in her stomach\" that comes and goes over the past 2 to 3 months. Usually triggered by eating. Overall pain has improved with taking the protonix but she stopped taking it after reading that it could caused kidney failure. Still feels like she has trouble with swallowing and food gets hung up in her chest which she did not have at her previous visit. Denies any excessive foods with acid or caffeine. Feeling like a gas pain but sometimes pain comes on with just drinking water. Describes as an aching type pain. No heart burn or reflux. Last for a hour or 2 and then subsides. No nausea or vomiting. Has had chronic issues with her bowels moving and feels that she sis having incresed sx of constipation. No blood in the stool and no melena. Cardiovascular Review:  The patient has hypertension, ELEAZAR (but has not been using the CPAP) and hyperlipidemia. Denies chest pain or chest tightness. SOB is at her usual.  No cough or congestion noted. Diet and Lifestyle: generally follows a low fat low cholesterol diet, generally follows a low sodium diet, follows a diabetic diet regularly  Home BP Monitoring: is well controlled at home, ranging 130s's/70-80's. Pertinent ROS: taking medications as instructed, no medication side effects noted, no TIA's, no chest pain on exertion, no swelling of ankles. Diabetes Mellitus:  She has diabetes mellitus.   Diabetic ROS - diabetic diet compliance: compliant most of the time, home glucose monitoring: is performed regularly, fasting values range low 100s,  Pt does not have BS log with her today, further diabetic ROS: no polyuria or polydipsia, no chest pain, dyspnea or TIA's, no numbness, tingling or pain in extremities, no unusual visual symptoms, no hypoglycemia. Her appetite is good. Lab review: orders written for new lab studies as appropriate; see orders. Chronic anemia and multiple myeloma in remission is being followed by hematology. Osteoarthritis:  Patient has osteoarthritis. She also had know spinal stenosis. She has been having pains in several joints that migrate and comes and goes. Overall the pain in the joints and lower back has been doing good with minimal pain. Symptoms onset: problem is longstanding. Rheumatological ROS: stable, mild-to-moderate joint symptoms intermittently, reasonably well controlled by PRN meds. Response to treatment plan: stable and intermittent. HM:  Microalbumin 4/28/2021  Mammogram 12/29/2020  Colonoscopy 7/15/2019 by  Renee Ambriz and was told she did not need another unless she had a problem. Eye exam was 1/27/2021 by Dr. Susi Sanchez. 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    Essential hypertension, malignant I10    Mixed hyperlipidemia E78.2    ELEAZAR on CPAP G47.33, Z99.89    Encounter for medication monitoring Z51.81    Type 2 diabetes mellitus without complication (HCC) K28.9    Type 2 diabetes with nephropathy (HCC) E11.21    WALKER (dyspnea on exertion) R06.00       Current Outpatient Medications   Medication Sig Dispense Refill    metFORMIN ER (GLUCOPHAGE XR) 500 mg tablet TAKE 1 TABLET BY MOUTH  DAILY WITH DINNER 90 Tablet 3    minoxidiL (LONITEN) 10 mg tablet TAKE ONE-HALF TABLET BY  MOUTH DAILY 45 Tablet 3    pantoprazole (PROTONIX) 40 mg tablet Take 1 Tablet by mouth daily.  Take prior to breakfast (Patient not taking: Reported on 3/14/2022) 30 Tablet 3    rosuvastatin (CRESTOR) 5 mg tablet TAKE 1 TABLET BY MOUTH  NIGHTLY 90 Tablet 3    cloNIDine HCL (CATAPRES) 0.3 mg tablet Take 0.3 mg by mouth nightly.  amLODIPine (NORVASC) 10 mg tablet TAKE 1 TABLET BY MOUTH ONCE DAILY 90 Tablet 3    spironolactone (ALDACTONE) 25 mg tablet TAKE 1 TABLET BY MOUTH  TWICE DAILY 180 Tablet 3    metoprolol tartrate (LOPRESSOR) 100 mg IR tablet TAKE 1 TABLET BY MOUTH  TWICE DAILY 180 Tablet 3    chlorthalidone (HYGROTON) 25 mg tablet TAKE 1 TABLET BY MOUTH  TWICE DAILY 180 Tablet 3    allopurinoL (ZYLOPRIM) 100 mg tablet TAKE 1 TABLET BY MOUTH  DAILY 90 Tablet 3    mupirocin calcium (BACTROBAN) 2 % topical cream Apply  to affected area two (2) times daily as needed (skin irritation). 30 g 1    meclizine (ANTIVERT) 12.5 mg tablet Take 1 Tab by mouth three (3) times daily as needed. Take as needed for dizziness. 30 Tab 0    ondansetron (ZOFRAN ODT) 4 mg disintegrating tablet Take 4 mg by mouth every eight (8) hours as needed for Nausea.  fluticasone (FLONASE) 50 mcg/actuation nasal spray 2 Sprays by Both Nostrils route daily as needed.  polyethylene glycol (MIRALAX) 17 gram packet Take 17 g by mouth daily as needed.  cyanocobalamin (VITAMIN B-12) 100 mcg tablet Take 100 mcg by mouth daily.  EPOETIN BRYAN (PROCRIT IJ) 1,000 Units by Injection route every fourteen (14) days. Depending on blood count       omega-3 fatty acids-vitamin e (FISH OIL) 1,000 mg Cap Take 1 Tab by mouth daily.  cholecalciferol, vitamin d3, (VITAMIN D) 1,000 unit tablet Take 1,000 Units by mouth daily.          Allergies   Allergen Reactions    Pcn [Penicillins] Swelling     tongue    Bactrim [Sulfamethoxazole-Trimethoprim] Diarrhea    Ceftin [Cefuroxime Axetil] Diarrhea    Hydralazine Other (comments)     edema    Sulfa (Sulfonamide Antibiotics) Itching         Past Medical History:   Diagnosis Date    Anemia NEC     Benign neoplasm of adrenal gland     Chronic anemia 6/1/2010    Constipation on a daily stool softener which pt states helps as of 16    Diabetes ([address]     WALKER (dyspnea on exertion)     as of 16 pt states this is her baseline and is not getting any worse    GERD (gastroesophageal reflux disease)     Goiter 2011    as of 16 Pt states \"I am not even sure I have it\".   Pt denies any problems    Gout 2010    as of 16 pt denies any sx    HTN (hypertension) 2010    followed by cardiology Dr Jennifer Hampton  [phone]    Multiple myeloma, without mention of having achieved remission Dx 99    followed by Elliott Cervantes    Pure hypercholesterolemia 2010    Sleep apnea 2010    no CPAP    Spinal stenosis 2010         Past Surgical History:   Procedure Laterality Date    COLONOSCOPY N/A 7/15/2019    flex sig , polypectomy performed by [facility]    HX HYSTERECTOMY  1970s    HX ORTHOPAEDIC      left hand-trigger finger    HX ORTHOPAEDIC      right hand- trigger finger    GA COLONOSCOPY FLX DX W/COLLJ SPEC WHEN PFRMD  08/10/2010    [facility]         Family History   Problem Relation Age of Onset    Hypertension Mother     No Known Problems Father     Cancer Sister 52        colon     Diabetes Sister     Kidney Disease Sister     Hypertension Sister     Elevated Lipids Sister        Social History     Tobacco Use    Smoking status: Former Smoker     Quit date: 1980     Years since quittin.3    Smokeless tobacco: Never Used   Substance Use Topics    Alcohol use: No     Alcohol/week: 0.0 standard drinks        Lab Results   Component Value Date/Time    WBC 4.0 2022 03:40 PM    HGB 10.0 (L) 2022 03:40 PM    HCT 31.8 (L) 2022 03:40 PM    PLATELET 605  03:40 PM    MCV 94.4 2022 03:40 PM     Lab Results   Component Value Date/Time    Cholesterol, total 102 2022 08:19 AM    HDL Cholesterol 38 2022 08:19 AM    LDL, calculated 46 2022 08:19 AM    LDL-C, External 57 10/14/2015 12:00 AM    Triglyceride 90 01/31/2022 08:19 AM    CHOL/HDL Ratio 2.7 01/31/2022 08:19 AM     Lab Results   Component Value Date/Time    TSH 2.860 05/11/2011 09:29 AM      Lab Results   Component Value Date/Time    Sodium 138 03/14/2022 03:40 PM    Potassium 4.8 03/14/2022 03:40 PM    Chloride 112 (H) 03/14/2022 03:40 PM    CO2 23 03/14/2022 03:40 PM    Anion gap 3 (L) 03/14/2022 03:40 PM    Glucose 99 03/14/2022 03:40 PM    BUN 29 (H) 03/14/2022 03:40 PM    Creatinine 1.54 (H) 03/14/2022 03:40 PM    BUN/Creatinine ratio 19 03/14/2022 03:40 PM    GFR est AA 39 (L) 03/14/2022 03:40 PM    GFR est non-AA 32 (L) 03/14/2022 03:40 PM    Calcium 9.6 03/14/2022 03:40 PM    Bilirubin, total 0.3 01/31/2022 08:19 AM    ALT (SGPT) 14 01/31/2022 08:19 AM    Alk. phosphatase 55 01/31/2022 08:19 AM    Protein, total 8.7 (H) 01/31/2022 08:19 AM    Albumin 3.5 01/31/2022 08:19 AM    Globulin 5.2 (H) 01/31/2022 08:19 AM    A-G Ratio 0.7 (L) 01/31/2022 08:19 AM      Lab Results   Component Value Date/Time    Hemoglobin A1c 6.2 (H) 10/27/2020 12:27 PM    Hemoglobin A1c (POC) 5.6 01/31/2022 08:05 AM    Hemoglobin A1c, External 6.4 10/14/2015 12:00 AM         Review of Systems   Constitutional: Negative for malaise/fatigue. HENT: Negative for congestion. Eyes: Negative for blurred vision. Respiratory: Negative for cough and shortness of breath. Cardiovascular: Negative for chest pain, palpitations and leg swelling. Gastrointestinal: Negative for abdominal pain, constipation and heartburn. Genitourinary: Negative for dysuria, frequency and urgency. Musculoskeletal: Negative for back pain and joint pain. Neurological: Negative for dizziness, tingling and headaches. Endo/Heme/Allergies: Negative for environmental allergies. Psychiatric/Behavioral: Negative for depression. The patient does not have insomnia. Physical Exam  Vitals and nursing note reviewed.    Constitutional: Appearance: Normal appearance. She is well-developed. Comments: BP (!) 126/50   Pulse 64   Temp 97.8 °F (36.6 °C) (Oral)   Resp 16   Ht 5' 1.5\" (1.562 m)   Wt 139 lb 9.6 oz (63.3 kg)   SpO2 96%   BMI 25.95 kg/m²    HENT:      Right Ear: Tympanic membrane and ear canal normal.      Left Ear: Tympanic membrane and ear canal normal.      Nose: No mucosal edema. Neck:      Thyroid: No thyromegaly. Cardiovascular:      Rate and Rhythm: Normal rate and regular rhythm. Heart sounds: Normal heart sounds. No gallop. Pulmonary:      Effort: Pulmonary effort is normal.      Breath sounds: Normal breath sounds. Abdominal:      General: Bowel sounds are normal.      Palpations: Abdomen is soft. There is no mass. Tenderness: There is no abdominal tenderness. Musculoskeletal:         General: No swelling. Normal range of motion. Cervical back: Normal range of motion and neck supple. Right lower leg: No edema. Left lower leg: No edema. Lymphadenopathy:      Cervical: No cervical adenopathy. Skin:     General: Skin is warm and dry. Neurological:      General: No focal deficit present. Mental Status: She is alert and oriented to person, place, and time. Psychiatric:         Mood and Affect: Mood normal.       ASSESSMENT and PLAN  Diagnoses and all orders for this visit:    1. Essential hypertension  Stable     2. Type 2 diabetes mellitus without complication, without long-term current use of insulin (HCC)  Continue to monitor. Work on diet and exercise. 3. Mixed hyperlipidemia  Continue to monitor. Work on diet and exercise. 4. Stage 3b chronic kidney disease (HCC)  Stable     5. Chronic anemia  Stable     6. Encounter for medication monitoring    7. Dysphagia, unspecified type  8. Gastroesophageal reflux disease, unspecified whether esophagitis present  We discussed protonix and that her CKD has been stable. -     REFERRAL TO GASTROENTEROLOGY    9.  Chronic idiopathic constipation  Add miralax daily    -     REFERRAL TO GASTROENTEROLOGY      Follow-up and Dispositions    · Return in about 3 months (around 7/13/2022). reviewed diet, exercise and weight control  cardiovascular risk and specific lipid/LDL goals reviewed  reviewed medications and side effects in detail  specific diabetic recommendations: low cholesterol diet, weight control and daily exercise discussed and glycohemoglobin and other lab monitoring discussed      I have discussed diagnosis listed in this note with pt and/or family. I have discussed treatment plans and options and the risk/benefit analysis of those options, including safe use of medications and possible medication side effects. Through the use of shared decision making we have agreed to the above plan. The patient has received an after-visit summary and questions were answered concerning future plans and follow up. Advise pt of any urgent changes then to proceed to the ER.

## 2022-04-13 NOTE — PROGRESS NOTES
Appt scheduled with GI specialist, Juanito Velez NP 6/29/2022 at 10:40 and arrive 15 minutes early. Office located at [facility] Dr. Nohemi Hernandez. Patient verbalized understanding.

## 2022-04-13 NOTE — PROGRESS NOTES
Chief Complaint   Patient presents with    Diabetes     follow up    Hypertension     follow up    Cholesterol Problem     follow up     Mammogram 12/30/21    Colonoscopy 07/15/19 Final     Microalbumin 04/28/21    1. \"Have you been to the ER, urgent care clinic since your last visit? Hospitalized since your last visit? \" No    2. \"Have you seen or consulted any other health care providers outside of the [address] [place] [address] since your last visit? \" No     3. For patients aged 39-70: Has the patient had a colonoscopy / FIT/ Cologuard? Yes - no Care Gap present      If the patient is female:    4. For patients aged 41-77: Has the patient had a mammogram within the past 2 years? Yes - no Care Gap present      5. For patients aged 21-65: Has the patient had a pap smear?  NA - based on age or sex

## 2022-04-19 ENCOUNTER — HOSPITAL ENCOUNTER (OUTPATIENT)
Dept: ULTRASOUND IMAGING | Age: 87
Discharge: HOME | End: 2022-04-19
Attending: FAMILY MEDICINE
Payer: MEDICARE

## 2022-04-19 DIAGNOSIS — N18.32 STAGE 3B CHRONIC KIDNEY DISEASE (HCC): ICD-10-CM

## 2022-04-19 PROCEDURE — 76770 US EXAM ABDO BACK WALL COMP: CPT

## 2022-04-21 ENCOUNTER — TELEPHONE (OUTPATIENT)
Dept: FAMILY MEDICINE CLINIC | Age: 87
End: 2022-04-21

## 2022-04-21 DIAGNOSIS — K82.8 GALLBLADDER SLUDGE: Primary | ICD-10-CM

## 2022-04-21 NOTE — TELEPHONE ENCOUNTER
Medical renal disease on US for kidneys. Tumefactive sludge in the GB. Recommend MRCP. Refer to GI. She is not having any abd pain.

## 2022-04-25 ENCOUNTER — DOCUMENTATION ONLY (OUTPATIENT)
Dept: FAMILY MEDICINE CLINIC | Age: 87
End: 2022-04-25

## 2022-05-09 ENCOUNTER — TRANSCRIBE ORDER (OUTPATIENT)
Dept: SCHEDULING | Age: 87
End: 2022-05-09

## 2022-05-09 DIAGNOSIS — N18.32 STAGE 3B CHRONIC KIDNEY DISEASE (HCC): ICD-10-CM

## 2022-05-09 DIAGNOSIS — I10 BENIGN HYPERTENSION: Primary | ICD-10-CM

## 2022-05-16 ENCOUNTER — HOSPITAL ENCOUNTER (OUTPATIENT)
Dept: VASCULAR SURGERY | Age: 87
Discharge: HOME | End: 2022-05-16
Attending: INTERNAL MEDICINE
Payer: MEDICARE

## 2022-05-16 DIAGNOSIS — N18.32 STAGE 3B CHRONIC KIDNEY DISEASE (HCC): ICD-10-CM

## 2022-05-16 DIAGNOSIS — I10 BENIGN HYPERTENSION: ICD-10-CM

## 2022-05-16 PROCEDURE — 93975 VASCULAR STUDY: CPT

## 2022-05-16 RX ORDER — CHLORTHALIDONE 25 MG/1
TABLET ORAL
Qty: 180 TABLET | Refills: 3 | Status: SHIPPED | OUTPATIENT
Start: 2022-05-16

## 2022-05-16 RX ORDER — ALLOPURINOL 100 MG/1
TABLET ORAL
Qty: 90 TABLET | Refills: 3 | Status: SHIPPED | OUTPATIENT
Start: 2022-05-16

## 2022-05-17 LAB
ABDOMINAL PROX AORTA VEL: 100.3 CM/S
CELIAC EDV: 10.5 CM/S
CELIAC PSV: 153.2 CM/S
DIST AORTIC AP: 1.51 CM
LEFT KIDNEY LENGTH: 10.22 CM
LEFT KIDNEY WIDTH: 5.98 CM
LEFT RENAL DIST DIAS: 6.9 CM/S
LEFT RENAL DIST RAR: 0.87
LEFT RENAL DIST RI: 0.92
LEFT RENAL DIST SYS: 87.3 CM/S
LEFT RENAL LOWER PARENCHYMA MAX: 45.5 CM/S
LEFT RENAL LOWER PARENCHYMA MIN: 5.8 CM/S
LEFT RENAL LOWER PARENCHYMA RI: 0.87
LEFT RENAL MID DIAS: 13.6 CM/S
LEFT RENAL MID RAR: 1
LEFT RENAL MID RI: 0.86
LEFT RENAL MID SYS: 100.3 CM/S
LEFT RENAL MIDDLE PARENCHYMA MAX: 39.3 CM/S
LEFT RENAL MIDDLE PARENCHYMA MIN: 4.9 CM/S
LEFT RENAL MIDDLE PARENCHYMA RI: 0.88
LEFT RENAL PROX DIAS: 8.8 CM/S
LEFT RENAL PROX RAR: 0.92
LEFT RENAL PROX RI: 0.9
LEFT RENAL PROX SYS: 92.3 CM/S
LEFT RENAL UPPER PARENCHYMA MAX: 29.9 CM/S
LEFT RENAL UPPER PARENCHYMA MIN: 5.8 CM/S
LEFT RENAL UPPER PARENCHYMA RI: 0.81
PROX AORTIC AP: 2.07 CM
PROX SMA EDV: 20.8 CM/S
PROX SMA PSV: 268 CM/S
RIGHT KIDNEY LENGTH: 9.39 CM
RIGHT KIDNEY WIDTH: 5.25 CM
RIGHT RENAL DIST DIAS: 10.3 CM/S
RIGHT RENAL DIST RAR: 0.98
RIGHT RENAL DIST RI: 0.9
RIGHT RENAL DIST SYS: 98.2 CM/S
RIGHT RENAL LOWER PARENCHYMA MAX: 21.9 CM/S
RIGHT RENAL LOWER PARENCHYMA MIN: 3.4 CM/S
RIGHT RENAL LOWER PARENCHYMA RI: 0.84
RIGHT RENAL MID DIAS: 17.1 CM/S
RIGHT RENAL MID RAR: 1.82
RIGHT RENAL MID RI: 0.91
RIGHT RENAL MID SYS: 183 CM/S
RIGHT RENAL MIDDLE PARENCHYMA MAX: 37.8 CM/S
RIGHT RENAL MIDDLE PARENCHYMA MIN: 4.7 CM/S
RIGHT RENAL MIDDLE PARENCHYMA RI: 0.88
RIGHT RENAL PROX DIAS: 19.7 CM/S
RIGHT RENAL PROX RAR: 1.85
RIGHT RENAL PROX RI: 0.89
RIGHT RENAL PROX SYS: 185.6 CM/S
RIGHT RENAL UPPER PARENCHYMA MAX: 28.6 CM/S
RIGHT RENAL UPPER PARENCHYMA MIN: 4.7 CM/S
RIGHT RENAL UPPER PARENCHYMA RI: 0.84

## 2022-07-13 ENCOUNTER — OFFICE VISIT (OUTPATIENT)
Dept: FAMILY MEDICINE CLINIC | Age: 87
End: 2022-07-13
Payer: MEDICARE

## 2022-07-13 VITALS
WEIGHT: 138.2 LBS | DIASTOLIC BLOOD PRESSURE: 51 MMHG | TEMPERATURE: 98.5 F | SYSTOLIC BLOOD PRESSURE: 134 MMHG | OXYGEN SATURATION: 98 % | HEART RATE: 51 BPM | RESPIRATION RATE: 20 BRPM | BODY MASS INDEX: 26.09 KG/M2 | HEIGHT: 61 IN

## 2022-07-13 DIAGNOSIS — D64.9 CHRONIC ANEMIA: ICD-10-CM

## 2022-07-13 DIAGNOSIS — N18.32 STAGE 3B CHRONIC KIDNEY DISEASE (HCC): ICD-10-CM

## 2022-07-13 DIAGNOSIS — K59.00 CONSTIPATION, UNSPECIFIED CONSTIPATION TYPE: ICD-10-CM

## 2022-07-13 DIAGNOSIS — Z51.81 ENCOUNTER FOR MEDICATION MONITORING: ICD-10-CM

## 2022-07-13 DIAGNOSIS — E78.2 MIXED HYPERLIPIDEMIA: ICD-10-CM

## 2022-07-13 DIAGNOSIS — R10.13 EPIGASTRIC PAIN: ICD-10-CM

## 2022-07-13 DIAGNOSIS — M15.9 GENERALIZED OSTEOARTHRITIS: ICD-10-CM

## 2022-07-13 DIAGNOSIS — I10 ESSENTIAL HYPERTENSION: ICD-10-CM

## 2022-07-13 DIAGNOSIS — Z00.00 MEDICARE ANNUAL WELLNESS VISIT, SUBSEQUENT: Primary | ICD-10-CM

## 2022-07-13 DIAGNOSIS — K21.9 GASTROESOPHAGEAL REFLUX DISEASE, UNSPECIFIED WHETHER ESOPHAGITIS PRESENT: ICD-10-CM

## 2022-07-13 DIAGNOSIS — E11.9 TYPE 2 DIABETES MELLITUS WITHOUT COMPLICATION, WITHOUT LONG-TERM CURRENT USE OF INSULIN (HCC): ICD-10-CM

## 2022-07-13 LAB
ALBUMIN SERPL-MCNC: 3.4 G/DL (ref 3.5–5)
ALBUMIN/GLOB SERPL: 0.6 {RATIO} (ref 1.1–2.2)
ALP SERPL-CCNC: 60 U/L (ref 45–117)
ALT SERPL-CCNC: 7 U/L (ref 12–78)
ANION GAP SERPL CALC-SCNC: 4 MMOL/L (ref 5–15)
APPEARANCE UR: CLEAR
AST SERPL-CCNC: 6 U/L (ref 15–37)
BACTERIA URNS QL MICRO: NEGATIVE /HPF
BILIRUB SERPL-MCNC: 0.4 MG/DL (ref 0.2–1)
BILIRUB UR QL: NEGATIVE
BUN SERPL-MCNC: 41 MG/DL (ref 6–20)
BUN/CREAT SERPL: 21 (ref 12–20)
CALCIUM SERPL-MCNC: 9.2 MG/DL (ref 8.5–10.1)
CHLORIDE SERPL-SCNC: 112 MMOL/L (ref 97–108)
CHOLEST SERPL-MCNC: 101 MG/DL
CO2 SERPL-SCNC: 21 MMOL/L (ref 21–32)
COLOR UR: NORMAL
CREAT SERPL-MCNC: 1.93 MG/DL (ref 0.55–1.02)
CREAT UR-MCNC: 106 MG/DL
EPITH CASTS URNS QL MICRO: NORMAL /LPF
ERYTHROCYTE [DISTWIDTH] IN BLOOD BY AUTOMATED COUNT: 17 % (ref 11.5–14.5)
GLOBULIN SER CALC-MCNC: 5.4 G/DL (ref 2–4)
GLUCOSE POC: 207 MG/DL
GLUCOSE SERPL-MCNC: 136 MG/DL (ref 65–100)
GLUCOSE UR STRIP.AUTO-MCNC: NEGATIVE MG/DL
HBA1C MFR BLD HPLC: 5.8 %
HCT VFR BLD AUTO: 33.5 % (ref 35–47)
HDLC SERPL-MCNC: 37 MG/DL
HDLC SERPL: 2.7 {RATIO} (ref 0–5)
HGB BLD-MCNC: 10.8 G/DL (ref 11.5–16)
HGB UR QL STRIP: NEGATIVE
HYALINE CASTS URNS QL MICRO: NORMAL /LPF (ref 0–5)
KETONES UR QL STRIP.AUTO: NEGATIVE MG/DL
LDLC SERPL CALC-MCNC: 46.2 MG/DL (ref 0–100)
LEUKOCYTE ESTERASE UR QL STRIP.AUTO: NEGATIVE
MCH RBC QN AUTO: 30 PG (ref 26–34)
MCHC RBC AUTO-ENTMCNC: 32.2 G/DL (ref 30–36.5)
MCV RBC AUTO: 93.1 FL (ref 80–99)
MICROALBUMIN UR-MCNC: 5.18 MG/DL
MICROALBUMIN/CREAT UR-RTO: 49 MG/G (ref 0–30)
NITRITE UR QL STRIP.AUTO: NEGATIVE
NRBC # BLD: 0 K/UL (ref 0–0.01)
NRBC BLD-RTO: 0 PER 100 WBC
PH UR STRIP: 5.5 [PH] (ref 5–8)
PLATELET # BLD AUTO: 152 K/UL (ref 150–400)
PMV BLD AUTO: 11 FL (ref 8.9–12.9)
POTASSIUM SERPL-SCNC: 5.2 MMOL/L (ref 3.5–5.1)
PROT SERPL-MCNC: 8.8 G/DL (ref 6.4–8.2)
PROT UR STRIP-MCNC: NEGATIVE MG/DL
RBC # BLD AUTO: 3.6 M/UL (ref 3.8–5.2)
RBC #/AREA URNS HPF: NORMAL /HPF (ref 0–5)
SODIUM SERPL-SCNC: 137 MMOL/L (ref 136–145)
SP GR UR REFRACTOMETRY: 1.01 (ref 1–1.03)
TRIGL SERPL-MCNC: 89 MG/DL (ref ?–150)
UROBILINOGEN UR QL STRIP.AUTO: 0.2 EU/DL (ref 0.2–1)
VLDLC SERPL CALC-MCNC: 17.8 MG/DL
WBC # BLD AUTO: 4 K/UL (ref 3.6–11)
WBC URNS QL MICRO: NORMAL /HPF (ref 0–4)

## 2022-07-13 PROCEDURE — 99214 OFFICE O/P EST MOD 30 MIN: CPT | Performed by: FAMILY MEDICINE

## 2022-07-13 PROCEDURE — G8417 CALC BMI ABV UP PARAM F/U: HCPCS | Performed by: FAMILY MEDICINE

## 2022-07-13 PROCEDURE — G0463 HOSPITAL OUTPT CLINIC VISIT: HCPCS | Performed by: FAMILY MEDICINE

## 2022-07-13 PROCEDURE — G8510 SCR DEP NEG, NO PLAN REQD: HCPCS | Performed by: FAMILY MEDICINE

## 2022-07-13 PROCEDURE — G8427 DOCREV CUR MEDS BY ELIG CLIN: HCPCS | Performed by: FAMILY MEDICINE

## 2022-07-13 PROCEDURE — 83036 HEMOGLOBIN GLYCOSYLATED A1C: CPT | Performed by: FAMILY MEDICINE

## 2022-07-13 PROCEDURE — G8536 NO DOC ELDER MAL SCRN: HCPCS | Performed by: FAMILY MEDICINE

## 2022-07-13 PROCEDURE — 3044F HG A1C LEVEL LT 7.0%: CPT | Performed by: FAMILY MEDICINE

## 2022-07-13 PROCEDURE — G0439 PPPS, SUBSEQ VISIT: HCPCS | Performed by: FAMILY MEDICINE

## 2022-07-13 PROCEDURE — 1124F ACP DISCUSS-NO DSCNMKR DOCD: CPT | Performed by: FAMILY MEDICINE

## 2022-07-13 PROCEDURE — 82947 ASSAY GLUCOSE BLOOD QUANT: CPT | Performed by: FAMILY MEDICINE

## 2022-07-13 PROCEDURE — 1090F PRES/ABSN URINE INCON ASSESS: CPT | Performed by: FAMILY MEDICINE

## 2022-07-13 PROCEDURE — 1101F PT FALLS ASSESS-DOCD LE1/YR: CPT | Performed by: FAMILY MEDICINE

## 2022-07-13 RX ORDER — POLYETHYLENE GLYCOL 3350 17 G/17G
17 POWDER, FOR SOLUTION ORAL DAILY
COMMUNITY
Start: 2022-07-13

## 2022-07-13 RX ORDER — FAMOTIDINE 20 MG/1
TABLET, FILM COATED ORAL
COMMUNITY
Start: 2022-06-29 | End: 2022-10-12 | Stop reason: ALTCHOICE

## 2022-07-13 RX ORDER — OMEPRAZOLE 20 MG/1
CAPSULE, DELAYED RELEASE ORAL
COMMUNITY
Start: 2022-06-29 | End: 2022-10-05 | Stop reason: CLARIF

## 2022-07-13 NOTE — PROGRESS NOTES
HISTORY OF PRESENT ILLNESS  Lauren Wren is a 80 y.o. female. HPI   Follow up on chronic medical problems. Overall feeling well. Doing the precautionary measures at home to reduce risks of exposure COVID19. Also wearing mask when she is going out. No known sick contacts or known exposure to [facility]. Follow up on c/o \"pain in her stomach\" that she had been having over the past several months. Overall the pain is improved. She did see GI. Has EGD schedule for September. Taking the protonix in the morning and pepcid at night. No nausea or vomiting. Still thinks that her BM don't  \"come as often as they should\". She sometime goes 3 to 4 days without a BM. No blood in the stool and no melena. Cardiovascular Review:  The patient has hypertension, ELEAZAR (but has not been using the CPAP) and hyperlipidemia. Denies chest pain or chest tightness. SOB is at her usual.  No cough or congestion noted. Diet and Lifestyle: generally follows a low fat low cholesterol diet, generally follows a low sodium diet, follows a diabetic diet regularly  Home BP Monitoring: is well controlled at home, ranging 130s's/70-80's. Pertinent ROS: taking medications as instructed, no medication side effects noted, no TIA's, no chest pain on exertion, no swelling of ankles. Diabetes Mellitus:  She has diabetes mellitus. Diabetic ROS - diabetic diet compliance: compliant most of the time, home glucose monitoring: is performed regularly, fasting values range low 100s,  Pt does not have BS log with her today, further diabetic ROS: no polyuria or polydipsia, no chest pain, dyspnea or TIA's, no numbness, tingling or pain in extremities, no unusual visual symptoms, no hypoglycemia. Her appetite is good. Lab review: orders written for new lab studies as appropriate; see orders. Chronic anemia and multiple myeloma in remission is being followed by hematology. Osteoarthritis:  Patient has osteoarthritis.   She also had known spinal stenosis. She has been having pains in several joints that is migratory and comes and goes. Overall the pain in the joints and lower back has been doing good with minimal pain. Symptoms onset: problem is longstanding. Rheumatological ROS: stable, mild-to-moderate joint symptoms intermittently, reasonably well controlled by PRN meds. Response to treatment plan: stable and intermittent. HM:  Mammogram 12/30/21  Colonoscopy 7/15/2019 by  Nesha Bah and was told she did not need another unless she had a problem. 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    Essential hypertension, malignant I10    Mixed hyperlipidemia E78.2    ELEAZAR on CPAP G47.33, Z99.89    Encounter for medication monitoring Z51.81    Type 2 diabetes mellitus without complication (HCC) G02.0    Type 2 diabetes with nephropathy ([facility] [address] E11.21    WALKER (dyspnea on exertion) R06.00       Current Outpatient Medications   Medication Sig Dispense Refill    cloNIDine HCL (CATAPRES) 0.3 mg tablet Take 1 Tablet by mouth nightly. Take 0.3 mg by mouth nightly. 90 Tablet 3    chlorthalidone (HYGROTON) 25 mg tablet TAKE 1 TABLET BY MOUTH  TWICE DAILY 180 Tablet 3    allopurinoL (ZYLOPRIM) 100 mg tablet TAKE 1 TABLET BY MOUTH  DAILY 90 Tablet 3    metFORMIN ER (GLUCOPHAGE XR) 500 mg tablet TAKE 1 TABLET BY MOUTH  DAILY WITH DINNER 90 Tablet 3    minoxidiL (LONITEN) 10 mg tablet TAKE ONE-HALF TABLET BY  MOUTH DAILY 45 Tablet 3    pantoprazole (PROTONIX) 40 mg tablet Take 1 Tablet by mouth daily.  Take prior to breakfast (Patient not taking: Reported on 3/14/2022) 30 Tablet 3    rosuvastatin (CRESTOR) 5 mg tablet TAKE 1 TABLET BY MOUTH  NIGHTLY 90 Tablet 3    amLODIPine (NORVASC) 10 mg tablet TAKE 1 TABLET BY MOUTH ONCE DAILY 90 Tablet 3    spironolactone (ALDACTONE) 25 mg tablet TAKE 1 TABLET BY MOUTH  TWICE DAILY 180 Tablet 3    metoprolol tartrate (LOPRESSOR) 100 mg IR tablet TAKE 1 TABLET BY MOUTH  TWICE DAILY 180 Tablet 3    mupirocin calcium (BACTROBAN) 2 % topical cream Apply  to affected area two (2) times daily as needed (skin irritation). 30 g 1    meclizine (ANTIVERT) 12.5 mg tablet Take 1 Tab by mouth three (3) times daily as needed. Take as needed for dizziness. 30 Tab 0    ondansetron (ZOFRAN ODT) 4 mg disintegrating tablet Take 4 mg by mouth every eight (8) hours as needed for Nausea.  fluticasone (FLONASE) 50 mcg/actuation nasal spray 2 Sprays by Both Nostrils route daily as needed.  polyethylene glycol (MIRALAX) 17 gram packet Take 17 g by mouth daily as needed.  cyanocobalamin (VITAMIN B-12) 100 mcg tablet Take 100 mcg by mouth daily.  EPOETIN BRYAN (PROCRIT IJ) 1,000 Units by Injection route every fourteen (14) days. Depending on blood count       omega-3 fatty acids-vitamin e (FISH OIL) 1,000 mg Cap Take 1 Tab by mouth daily.  cholecalciferol, vitamin d3, (VITAMIN D) 1,000 unit tablet Take 1,000 Units by mouth daily. Allergies   Allergen Reactions    Pcn [Penicillins] Swelling     tongue    Bactrim [Sulfamethoxazole-Trimethoprim] Diarrhea    Ceftin [Cefuroxime Axetil] Diarrhea    Hydralazine Other (comments)     edema    Sulfa (Sulfonamide Antibiotics) Itching         Past Medical History:   Diagnosis Date    Anemia NEC     Benign neoplasm of adrenal gland     Chronic anemia 6/1/2010    Constipation     on a daily stool softener which pt states helps as of 4/27/16    Diabetes ([address]     WALKER (dyspnea on exertion)     as of 4/27/16 pt states this is her baseline and is not getting any worse    GERD (gastroesophageal reflux disease)     Goiter 2011    as of 4/27/16 Pt states \"I am not even sure I have it\".   Pt denies any problems    Gout 6/1/2010    as of 4/27/16 pt denies any sx    HTN (hypertension) 2010    followed by cardiology Dr Aleksandra Hanley  [phone]    Multiple myeloma, without mention of having achieved remission Dx 99    followed by Rusty Jackson    Pure hypercholesterolemia 2010    Sleep apnea 2010    no CPAP    Spinal stenosis 2010         Past Surgical History:   Procedure Laterality Date    COLONOSCOPY N/A 7/15/2019    flex sig , polypectomy performed [facility] Romy Resendiz [facility] OR    HX HYSTERECTOMY  1970s    HX ORTHOPAEDIC      left hand-trigger finger    HX ORTHOPAEDIC      right hand- trigger finger    [place] COLONOSCOPY FLX DX W/COLLJ SPEC WHEN PFRMD  08/10/2010    Haja Camacho         Family History   Problem Relation Age of Onset    Hypertension Mother     No Known Problems Father     Cancer Sister 52        colon     Diabetes Sister     Kidney Disease Sister     Hypertension Sister     Elevated Lipids Sister        Social History     Tobacco Use    Smoking status: Former Smoker     Quit date: 1980     Years since quittin.5    Smokeless tobacco: Never Used   Substance Use Topics    Alcohol use: No     Alcohol/week: 0.0 standard drinks        Lab Results   Component Value Date/Time    WBC 4.0 2022 03:40 PM    HGB 10.0 (L) 2022 03:40 PM    HCT 31.8 (L) 2022 03:40 PM    PLATELET 691  03:40 PM    MCV 94.4 2022 03:40 PM     Lab Results   Component Value Date/Time    Cholesterol, total 102 2022 08:19 AM    HDL Cholesterol 38 2022 08:19 AM    LDL, calculated 46 2022 08:19 AM    LDL-C, External 57 10/14/2015 12:00 AM    Triglyceride 90 2022 08:19 AM    CHOL/HDL Ratio 2.7 2022 08:19 AM     Lab Results   Component Value Date/Time    TSH 2.860 2011 09:29 AM      Lab Results   Component Value Date/Time    Sodium 138 2022 03:40 PM    Potassium 4.8 2022 03:40 PM    Chloride 112 (H) 2022 03:40 PM    CO2 23 2022 03:40 PM Anion gap 3 (L) 03/14/2022 03:40 PM    Glucose 99 03/14/2022 03:40 PM    BUN 29 (H) 03/14/2022 03:40 PM    Creatinine 1.54 (H) 03/14/2022 03:40 PM    BUN/Creatinine ratio 19 03/14/2022 03:40 PM    GFR est AA 39 (L) 03/14/2022 03:40 PM    GFR est non-AA 32 (L) 03/14/2022 03:40 PM    Calcium 9.6 03/14/2022 03:40 PM    Bilirubin, total 0.3 01/31/2022 08:19 AM    ALT (SGPT) 14 01/31/2022 08:19 AM    Alk. phosphatase 55 01/31/2022 08:19 AM    Protein, total 8.7 (H) 01/31/2022 08:19 AM    Albumin 3.5 01/31/2022 08:19 AM    Globulin 5.2 (H) 01/31/2022 08:19 AM    A-G Ratio 0.7 (L) 01/31/2022 08:19 AM      Lab Results   Component Value Date/Time    Hemoglobin A1c 6.2 (H) 10/27/2020 12:27 PM    Hemoglobin A1c (POC) 5.6 01/31/2022 08:05 AM    Hemoglobin A1c, External 6.4 10/14/2015 12:00 AM         Review of Systems   Constitutional: Negative for malaise/fatigue. HENT: Negative for congestion. Eyes: Negative for blurred vision. Respiratory: Negative for cough and shortness of breath. Cardiovascular: Negative for chest pain, palpitations and leg swelling. Gastrointestinal: Negative for abdominal pain, constipation and heartburn. Genitourinary: Negative for dysuria, frequency and urgency. Musculoskeletal: Negative for back pain and joint pain. Neurological: Negative for dizziness, tingling and headaches. Endo/Heme/Allergies: Negative for environmental allergies. Psychiatric/Behavioral: Negative for depression. The patient does not have insomnia. Physical Exam  Vitals and nursing note reviewed. Constitutional:       Appearance: Normal appearance. She is well-developed.       Comments: BP (!) 134/51 (BP 1 Location: Left upper arm, BP Patient Position: Sitting, BP Cuff Size: Adult)   Pulse (!) 51   Temp 98.5 °F (36.9 °C) (Oral)   Resp 20   Ht 5' 1\" (1.549 m)   Wt 138 lb 3.2 oz (62.7 kg)   SpO2 98%   BMI 26.11 kg/m²      HENT:      Right Ear: Tympanic membrane and ear canal normal.      Left Ear: Tympanic membrane and ear canal normal.      Nose: No mucosal edema. Neck:      Thyroid: No thyromegaly. Cardiovascular:      Rate and Rhythm: Normal rate and regular rhythm. Heart sounds: Normal heart sounds. No gallop. Pulmonary:      Effort: Pulmonary effort is normal.      Breath sounds: Normal breath sounds. Abdominal:      General: Bowel sounds are normal.      Palpations: Abdomen is soft. There is no mass. Tenderness: There is no abdominal tenderness. Musculoskeletal:         General: No swelling. Normal range of motion. Cervical back: Normal range of motion and neck supple. Right lower leg: No edema. Left lower leg: No edema. Lymphadenopathy:      Cervical: No cervical adenopathy. Skin:     General: Skin is warm and dry. Neurological:      General: No focal deficit present. Mental Status: She is alert and oriented to person, place, and time. Psychiatric:         Mood and Affect: Mood normal.       ASSESSMENT and PLAN  Diagnoses and all orders for this visit:    1. Medicare annual wellness visit, subsequent    2. Essential hypertension  Stable     3. Type 2 diabetes mellitus without complication, without long-term current use of insulin ([facility])  a1c stable at 5.8%. -     AMB POC HEMOGLOBIN A1C  -     AMB POC GLUCOSE, QUANTITATIVE, BLOOD  -     MICROALBUMIN, UR, RAND W/ MICROALB/CREAT RATIO; Future    4. Mixed hyperlipidemia  -     LIPID PANEL; Future    5. Stage 3b chronic kidney disease ([address]  As per renal.      6. Chronic anemia  -     CBC W/O DIFF; Future    7. Epigastric pain//  8. Gastroesophageal reflux disease, unspecified whether esophagitis present  Improved on current regimen. 9. Generalized osteoarthritis  Overall stable     10. Constipation, unspecified constipation type  Restart back with miralax daily    11. Encounter for medication monitoring  -     METABOLIC PANEL, COMPREHENSIVE;  Future  -     URINALYSIS W/MICROSCOPIC; Future      Follow-up and Dispositions    · Return in about 3 months (around 10/13/2022). reviewed diet, exercise and weight control  cardiovascular risk and specific lipid/LDL goals reviewed  reviewed medications and side effects in detail  specific diabetic recommendations: low cholesterol diet, weight control and daily exercise discussed, all medications, side effects and compliance discussed carefully, foot care discussed and Podiatry visits discussed, annual eye examinations at Ophthalmology discussed and glycohemoglobin and other lab monitoring discussed     I have discussed diagnosis listed in this note with pt and/or family. I have discussed treatment plans and options and the risk/benefit analysis of those options, including safe use of medications and possible medication side effects. Through the use of shared decision making we have agreed to the above plan. The patient has received an after-visit summary and questions were answered concerning future plans and follow up. Advise pt of any urgent changes then to proceed to the ER.

## 2022-07-13 NOTE — PROGRESS NOTES
Identified pt with two pt identifiers(name and ). Reviewed record in preparation for visit and have obtained necessary documentation. Chief Complaint   Patient presents with    Follow-up     3 mo        Health Maintenance Due   Topic    COVID-19 Vaccine (4 - Booster for Moderna series)    Foot Exam Q1     MICROALBUMIN Q1     Medicare Yearly Exam      Vitals:    22 0940   BP: (!) 134/51   Pulse: (!) 51   Resp: 20   Temp: 98.5 °F (36.9 °C)   TempSrc: Oral   SpO2: 98%   Weight: 138 lb 3.2 oz (62.7 kg)   Height: 5' 1\" (1.549 m)   PainSc:   0 - No pain       Pain Scale: 0 - No pain/10        Coordination of Care Questionnaire:  :     1. Have you been to the ER, urgent care clinic since your last visit? Hospitalized since your last visit? No    2. Have you seen or consulted any other health care providers outside of the [address] [place] [address] since your last visit? Include any pap smears or colon screening. No    3. For patients aged 39-70: Has the patient had a colonoscopy / FIT/ Cologuard? NA - based on age      If the patient is female:    4. For patients aged 41-77: Has the patient had a mammogram within the past 2 years? NA - based on age or sex      11. For patients aged 21-65: Has the patient had a pap smear?  NA - based on age or sex

## 2022-07-13 NOTE — PROGRESS NOTES
This is a Subsequent Medicare Annual Wellness Exam (AWV) (Performed 12 months after IPPE or effective date of Medicare Part B enrollment)    I have reviewed the patient's medical history in detail and updated the computerized patient record. History     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    Essential hypertension, malignant I10    Mixed hyperlipidemia E78.2    ELEAZAR on CPAP G47.33, Z99.89    Encounter for medication monitoring Z51.81    Type 2 diabetes mellitus without complication ([facility] [address] E24.7    Type 2 diabetes with nephropathy ([facility] [address] E11.21    WALKER (dyspnea on exertion) R06.00       Current Outpatient Medications   Medication Sig Dispense Refill    cloNIDine HCL (CATAPRES) 0.3 mg tablet Take 1 Tablet by mouth nightly. Take 0.3 mg by mouth nightly. 90 Tablet 3    chlorthalidone (HYGROTON) 25 mg tablet TAKE 1 TABLET BY MOUTH  TWICE DAILY 180 Tablet 3    allopurinoL (ZYLOPRIM) 100 mg tablet TAKE 1 TABLET BY MOUTH  DAILY 90 Tablet 3    metFORMIN ER (GLUCOPHAGE XR) 500 mg tablet TAKE 1 TABLET BY MOUTH  DAILY WITH DINNER 90 Tablet 3    minoxidiL (LONITEN) 10 mg tablet TAKE ONE-HALF TABLET BY  MOUTH DAILY 45 Tablet 3    pantoprazole (PROTONIX) 40 mg tablet Take 1 Tablet by mouth daily.  Take prior to breakfast (Patient not taking: Reported on 3/14/2022) 30 Tablet 3    rosuvastatin (CRESTOR) 5 mg tablet TAKE 1 TABLET BY MOUTH  NIGHTLY 90 Tablet 3    amLODIPine (NORVASC) 10 mg tablet TAKE 1 TABLET BY MOUTH ONCE DAILY 90 Tablet 3    spironolactone (ALDACTONE) 25 mg tablet TAKE 1 TABLET BY MOUTH  TWICE DAILY 180 Tablet 3    metoprolol tartrate (LOPRESSOR) 100 mg IR tablet TAKE 1 TABLET BY MOUTH  TWICE DAILY 180 Tablet 3    mupirocin calcium (BACTROBAN) 2 % topical cream Apply  to affected area two (2) times daily as needed (skin irritation). 30 g 1    meclizine (ANTIVERT) 12.5 mg tablet Take 1 Tab by mouth three (3) times daily as needed. Take as needed for dizziness. 30 Tab 0    ondansetron (ZOFRAN ODT) 4 mg disintegrating tablet Take 4 mg by mouth every eight (8) hours as needed for Nausea.  fluticasone (FLONASE) 50 mcg/actuation nasal spray 2 Sprays by Both Nostrils route daily as needed.  polyethylene glycol (MIRALAX) 17 gram packet Take 17 g by mouth daily as needed.  cyanocobalamin (VITAMIN B-12) 100 mcg tablet Take 100 mcg by mouth daily.  EPOETIN BRYAN (PROCRIT IJ) 1,000 Units by Injection route every fourteen (14) days. Depending on blood count       omega-3 fatty acids-vitamin e (FISH OIL) 1,000 mg Cap Take 1 Tab by mouth daily.  cholecalciferol, vitamin d3, (VITAMIN D) 1,000 unit tablet Take 1,000 Units by mouth daily. Allergies   Allergen Reactions    Pcn [Penicillins] Swelling     tongue    Bactrim [Sulfamethoxazole-Trimethoprim] Diarrhea    Ceftin [Cefuroxime Axetil] Diarrhea    Hydralazine Other (comments)     edema    Sulfa (Sulfonamide Antibiotics) Itching         Past Medical History:   Diagnosis Date    Anemia NEC     Benign neoplasm of adrenal gland     Chronic anemia 6/1/2010    Constipation     on a daily stool softener which pt states helps as of 4/27/16    Diabetes ([address]     WALKER (dyspnea on exertion)     as of 4/27/16 pt states this is her baseline and is not getting any worse    GERD (gastroesophageal reflux disease)     Goiter 2011    as of 4/27/16 Pt states \"I am not even sure I have it\".   Pt denies any problems    Gout 6/1/2010    as of 4/27/16 pt denies any sx    HTN (hypertension) 6/1/2010    followed by cardiology Dr Laney Emmanuel  [address] [place] Multiple myeloma, without mention of having achieved remission Dx 11/1/99    followed by Fabrizio Drake    Pure hypercholesterolemia 6/1/2010    Sleep apnea 6/1/2010    no CPAP    Spinal stenosis 2010         Past Surgical History:   Procedure Laterality Date    COLONOSCOPY N/A 7/15/2019    flex sig , polypectomy performed by Maile Howard [facility] at [facility] AMBUL[facility]ORY OR    HX HYSTERECTOMY      HX ORTHOPAEDIC      left hand-trigger finger    HX ORTHOPAEDIC      right hand- trigger finger    [place] COLONOSCOPY FLX DX W/COLLJ SPEC WHEN PFRMD  08/10/2010    [facility]         Family History   Problem Relation Age of Onset    Hypertension Mother     No Known Problems Father     Cancer Sister 52        colon     Diabetes Sister     Kidney Disease Sister     Hypertension Sister     Elevated Lipids Sister        Social History     Tobacco Use    Smoking status: Former Smoker     Quit date: 1980     Years since quittin.5    Smokeless tobacco: Never Used   Substance Use Topics    Alcohol use: No     Alcohol/week: 0.0 standard drinks         Depression Risk Factor Screening:     PHQ over the last two weeks    Little interest or pleasure in doing things Not at all   Feeling down, depressed or hopeless Not at all   Total Score PHQ 2 0     Alcohol Risk Factor Screening: You do not drink alcohol or very rarely. Functional Ability and Level of Safety:   Hearing Loss  Hearing is good. Activities of Daily Living  The home contains: discussed safety equipment. Patient does total self care    Fall RiskFall Risk Assessment, last 12 mths    Able to walk? Yes   Fall in past 12 months? No     Functional Ability:   Does the patient exhibit a steady gait? yes    How long did it take the patient to get up and walk from a sitting position? seconds    Is the patient self reliant? (ie can do own laundry, meals, household chores)  yes   Does the patient handle his/her own medications? yes   Does the patient handle his/her own money? yes   Is the patients home safe (ie good lighting, handrails on stairs and bath, etc.)?   yes   Did you notice or did patient express any hearing difficulties? no   Did you notice or did patient express any vision difficulties? no        Advance Care Planning:   Patient was offered the opportunity to discuss advance care planning:  yes    Does patient have an Advance Directive:  no   If no, did you provide information on [facility] Connections? yes      Abuse Screen  Patient is not abused    Cognitive Screening   Evaluation of Cognitive Function:  Has your family/caregiver stated any concerns about your memory: no      Patient Care Team   Patient Care Team:  Agustín Davis MD as PCP - General    Assessment/Plan   Education and counseling provided:  Are appropriate based on today's review and evaluation  End-of-Life planning (with patient's consent)    ASSESSMENT and PLAN    Medicare Annual Wellness  Continue current treatment plan. Continue annual follow up. I have discussed diagnosis listed in this note with pt and/or family. I have discussed treatment plans and options and the risk/benefit analysis of those options, including safe use of medications and possible medication side effects. Through the use of shared decision making we have agreed to the above plan. The patient has received an after-visit summary and questions were answered concerning future plans and follow up. Advise pt of any urgent changes then to proceed to the ER.

## 2022-10-05 ENCOUNTER — ANESTHESIA EVENT (OUTPATIENT)
Dept: ENDOSCOPY | Age: 87
End: 2022-10-05
Payer: MEDICARE

## 2022-10-05 NOTE — ANESTHESIA PREPROCEDURE EVALUATION
Anesthetic History   No history of anesthetic complications            Review of Systems / Medical History  Patient summary reviewed, nursing notes reviewed and pertinent labs reviewed    Pulmonary        Sleep apnea: No treatment  Shortness of breath and smoker      Comments: Former smoker, quit in 36   Neuro/Psych   Within defined limits           Cardiovascular    Hypertension          Hyperlipidemia    Exercise tolerance: <4 METS  Comments: Chronic WALKER    11/18 Stress test normal    11/18 ECHO= EF  60-65%,severe LVH, grade 1 DD   GI/Hepatic/Renal     GERD (no meds)      Hiatal hernia     Endo/Other    Diabetes: well controlled, type 2  Hypothyroidism  Cancer (Multiple Myeloma) and anemia (chronic)     Other Findings   Comments: Spinal Stenosis  Benign neoplasm of adrenal gland   Multiple myeloma         Physical Exam    Airway  Mallampati: III  TM Distance: 4 - 6 cm  Neck ROM: normal range of motion   Mouth opening: Normal     Cardiovascular    Rhythm: regular  Rate: normal    Murmur (left & right SB): Grade 2     Dental    Dentition: Full upper dentures and Full lower dentures     Pulmonary  Breath sounds clear to auscultation               Abdominal  GI exam deferred       Other Findings            Anesthetic Plan    ASA: 3  Anesthesia type: general and total IV anesthesia          Induction: Intravenous  Anesthetic plan and risks discussed with: Patient      Propofol MAC/Supernova

## 2022-10-06 ENCOUNTER — ANESTHESIA (OUTPATIENT)
Dept: ENDOSCOPY | Age: 87
End: 2022-10-06
Payer: MEDICARE

## 2022-10-06 ENCOUNTER — HOSPITAL ENCOUNTER (OUTPATIENT)
Age: 87
Setting detail: OUTPATIENT SURGERY
Discharge: HOME | End: 2022-10-06
Attending: INTERNAL MEDICINE | Admitting: INTERNAL MEDICINE
Payer: MEDICARE

## 2022-10-06 VITALS
HEART RATE: 59 BPM | OXYGEN SATURATION: 95 % | BODY MASS INDEX: 25.94 KG/M2 | SYSTOLIC BLOOD PRESSURE: 148 MMHG | HEIGHT: 61 IN | DIASTOLIC BLOOD PRESSURE: 47 MMHG | RESPIRATION RATE: 16 BRPM | WEIGHT: 137.4 LBS | TEMPERATURE: 97.8 F

## 2022-10-06 PROCEDURE — 74011250636 HC RX REV CODE- 250/636: Performed by: INTERNAL MEDICINE

## 2022-10-06 PROCEDURE — 76040000007: Performed by: INTERNAL MEDICINE

## 2022-10-06 PROCEDURE — 88305 TISSUE EXAM BY PATHOLOGIST: CPT

## 2022-10-06 PROCEDURE — 77030019988 HC FCPS ENDOSC DISP BSC -B: Performed by: INTERNAL MEDICINE

## 2022-10-06 PROCEDURE — 77030018712 HC DEV BLLN INFL BSC -B: Performed by: INTERNAL MEDICINE

## 2022-10-06 PROCEDURE — 2709999900 HC NON-CHARGEABLE SUPPLY: Performed by: INTERNAL MEDICINE

## 2022-10-06 PROCEDURE — 76060000032 HC ANESTHESIA 0.5 TO 1 HR: Performed by: INTERNAL MEDICINE

## 2022-10-06 PROCEDURE — 77030039825 HC MSK NSL PAP SUPERNO2VA VYRM -B: Performed by: ANESTHESIOLOGY

## 2022-10-06 PROCEDURE — 74011000250 HC RX REV CODE- 250: Performed by: ANESTHESIOLOGY

## 2022-10-06 PROCEDURE — 74011250636 HC RX REV CODE- 250/636: Performed by: ANESTHESIOLOGY

## 2022-10-06 RX ORDER — ATROPINE SULFATE 0.1 MG/ML
0.5 INJECTION INTRAVENOUS
Status: DISCONTINUED | OUTPATIENT
Start: 2022-10-06 | End: 2022-10-06 | Stop reason: HOSPADM

## 2022-10-06 RX ORDER — PROPOFOL 10 MG/ML
INJECTION, EMULSION INTRAVENOUS AS NEEDED
Status: DISCONTINUED | OUTPATIENT
Start: 2022-10-06 | End: 2022-10-06 | Stop reason: HOSPADM

## 2022-10-06 RX ORDER — DIPHENHYDRAMINE HYDROCHLORIDE 50 MG/ML
50 INJECTION, SOLUTION INTRAMUSCULAR; INTRAVENOUS ONCE
Status: DISCONTINUED | OUTPATIENT
Start: 2022-10-06 | End: 2022-10-06 | Stop reason: HOSPADM

## 2022-10-06 RX ORDER — MIDAZOLAM HYDROCHLORIDE 1 MG/ML
.25-5 INJECTION, SOLUTION INTRAMUSCULAR; INTRAVENOUS
Status: DISCONTINUED | OUTPATIENT
Start: 2022-10-06 | End: 2022-10-06 | Stop reason: HOSPADM

## 2022-10-06 RX ORDER — EPINEPHRINE 0.1 MG/ML
1 INJECTION INTRACARDIAC; INTRAVENOUS
Status: DISCONTINUED | OUTPATIENT
Start: 2022-10-06 | End: 2022-10-06 | Stop reason: HOSPADM

## 2022-10-06 RX ORDER — FLUMAZENIL 0.1 MG/ML
0.2 INJECTION INTRAVENOUS
Status: DISCONTINUED | OUTPATIENT
Start: 2022-10-06 | End: 2022-10-06 | Stop reason: HOSPADM

## 2022-10-06 RX ORDER — NALOXONE HYDROCHLORIDE 0.4 MG/ML
0.4 INJECTION, SOLUTION INTRAMUSCULAR; INTRAVENOUS; SUBCUTANEOUS
Status: DISCONTINUED | OUTPATIENT
Start: 2022-10-06 | End: 2022-10-06 | Stop reason: HOSPADM

## 2022-10-06 RX ORDER — LIDOCAINE HYDROCHLORIDE 20 MG/ML
INJECTION, SOLUTION EPIDURAL; INFILTRATION; INTRACAUDAL; PERINEURAL AS NEEDED
Status: DISCONTINUED | OUTPATIENT
Start: 2022-10-06 | End: 2022-10-06 | Stop reason: HOSPADM

## 2022-10-06 RX ORDER — DEXTROMETHORPHAN/PSEUDOEPHED 2.5-7.5/.8
1.2 DROPS ORAL
Status: DISCONTINUED | OUTPATIENT
Start: 2022-10-06 | End: 2022-10-06 | Stop reason: HOSPADM

## 2022-10-06 RX ORDER — SODIUM CHLORIDE 9 MG/ML
125 INJECTION, SOLUTION INTRAVENOUS CONTINUOUS
Status: DISCONTINUED | OUTPATIENT
Start: 2022-10-06 | End: 2022-10-06 | Stop reason: HOSPADM

## 2022-10-06 RX ADMIN — PROPOFOL 30 MG: 10 INJECTION, EMULSION INTRAVENOUS at 13:57

## 2022-10-06 RX ADMIN — PROPOFOL 50 MG: 10 INJECTION, EMULSION INTRAVENOUS at 14:05

## 2022-10-06 RX ADMIN — PROPOFOL 50 MG: 10 INJECTION, EMULSION INTRAVENOUS at 13:51

## 2022-10-06 RX ADMIN — LIDOCAINE HYDROCHLORIDE 80 MG: 20 INJECTION, SOLUTION EPIDURAL; INFILTRATION; INTRACAUDAL; PERINEURAL at 13:51

## 2022-10-06 RX ADMIN — SODIUM CHLORIDE 125 ML/HR: 9 INJECTION, SOLUTION INTRAVENOUS at 13:46

## 2022-10-06 RX ADMIN — PROPOFOL 20 MG: 10 INJECTION, EMULSION INTRAVENOUS at 14:01

## 2022-10-06 NOTE — PROGRESS NOTES
TRANSFER - IN REPORT:    Verbal report received from [facility]) on Ryan Nino  being received from Endo (unit) for routine progression of care      Report consisted of patients Situation, Background, Assessment and   Recommendations(SBAR). Information from the following report(s) SBAR and Kardex was reviewed with the receiving nurse. Opportunity for questions and clarification was provided. Assessment completed upon patients arrival to unit and care assumed.

## 2022-10-06 NOTE — H&P
[facility]  Outpatient History and Physical    Patient: Rachel May    Physician: Coby Decker MD    Vital Signs: Blood pressure (!) 171/49, pulse (!) 52, temperature 97.7 °F (36.5 °C), resp. rate 14, height 5' 1\" (1.549 m), weight 62.3 kg (137 lb 6.4 oz), SpO2 98 %, not currently breastfeeding. Allergies: Allergies   Allergen Reactions    Pcn [Penicillins] Swelling     tongue    Bactrim [Sulfamethoxazole-Trimethoprim] Diarrhea    Ceftin [Cefuroxime Axetil] Diarrhea    Hydralazine Other (comments)     edema    Sulfa (Sulfonamide Antibiotics) Itching       Chief Complaint: esophageal dysphagia    History:  Past Medical History:   Diagnosis Date    Anemia NEC     Benign neoplasm of adrenal gland     Chronic anemia 6/1/2010    Constipation     on a daily stool softener which pt states helps as of 4/27/16    Diabetes ([address]     WALKER (dyspnea on exertion)     as of 4/27/16 pt states this is her baseline and is not getting any worse    GERD (gastroesophageal reflux disease)     Goiter 2011    as of 4/27/16 Pt states \"I am not even sure I have it\".   Pt denies any problems    Gout 6/1/2010    as of 4/27/16 pt denies any sx    HTN (hypertension) 6/1/2010    followed by cardiology Dr Melvia Peabody  [phone]    Multiple myeloma, without mention of having achieved remission Dx 11/1/99    followed by Jennifer Lion    Pure hypercholesterolemia 6/1/2010    Sleep apnea 6/1/2010    no CPAP    Spinal stenosis 6/1/2010      Past Surgical History:   Procedure Laterality Date    COLONOSCOPY N/A 7/15/2019    flex sig , polypectomy performed by Lieutenant Jeffery MD at [facility]  1970s    HX ORTHOPAEDIC  2005    left hand-trigger finger    HX ORTHOPAEDIC  2007    right hand- trigger finger    GA COLONOSCOPY FLX DX W/COLLJ SPEC WHEN PFRMD  08/10/2010          Social History     Socioeconomic History    Marital status:    Tobacco Use    Smoking status: Former Types: Cigarettes     Quit date: 1980     Years since quittin.7    Smokeless tobacco: Never   Vaping Use    Vaping Use: Never used   Substance and Sexual Activity    Alcohol use: No     Alcohol/week: 0.0 standard drinks    Drug use: No    Sexual activity: Not Currently   Social History Narrative    Lives with  in one story home      Family History   Problem Relation Age of Onset    Hypertension Mother     No Known Problems Father     Cancer Sister 52        colon     Diabetes Sister     Kidney Disease Sister     Hypertension Sister     Elevated Lipids Sister        Medications:   Prior to Admission medications    Medication Sig Start Date End Date Taking? Authorizing Provider   cloNIDine HCL (CATAPRES) 0.3 mg tablet Take 1 Tablet by mouth nightly. Take 0.3 mg by mouth nightly. 22  Yes Sorin Bess MD   chlorthalidone (HYGROTON) 25 mg tablet TAKE 1 TABLET BY MOUTH  TWICE DAILY 22  Yes Sorin Bess MD   allopurinoL (ZYLOPRIM) 100 mg tablet TAKE 1 TABLET BY MOUTH  DAILY 22  Yes Sorin Bess MD   metFORMIN ER (GLUCOPHAGE XR) 500 mg tablet TAKE 1 TABLET BY MOUTH  DAILY WITH DINNER 22  Yes Thor Henson MD   minoxidiL (LONITEN) 10 mg tablet TAKE ONE-HALF TABLET BY  MOUTH DAILY 22  Yes Thor Henson MD   rosuvastatin (CRESTOR) 5 mg tablet TAKE 1 TABLET BY MOUTH  NIGHTLY 21  Yes Clyde Henson MD   amLODIPine (NORVASC) 10 mg tablet TAKE 1 TABLET BY MOUTH ONCE DAILY 21  Yes Thor Henson MD   spironolactone (ALDACTONE) 25 mg tablet TAKE 1 TABLET BY MOUTH  TWICE DAILY 21  Yes Thor Henson MD   metoprolol tartrate (LOPRESSOR) 100 mg IR tablet TAKE 1 TABLET BY MOUTH  TWICE DAILY 21  Yes Thor Henson MD   cyanocobalamin (VITAMIN B12) 100 mcg tablet Take 100 mcg by mouth daily.    Yes Provider, Historical   EPOETIN BRYAN (PROCRIT IJ) 1,000 Units by Injection route as needed. Depending on blood count   Yes Provider, Historical   omega-3 fatty acids-vitamin e 1,000 mg cap Take 1 Tab by mouth daily. 6/2/10  Yes Provider, Historical   cholecalciferol (VITAMIN D3) (1000 Units /25 mcg) tablet Take 1,000 Units by mouth daily. Yes Provider, Historical   famotidine (PEPCID) 20 mg tablet  6/29/22   Provider, Historical   polyethylene glycol (MIRALAX) 17 gram packet Take 1 Packet by mouth daily. 7/13/22   Yohana Salvador MD   pantoprazole (PROTONIX) 40 mg tablet Take 1 Tablet by mouth daily. Take prior to breakfast  Patient not taking: Reported on 10/5/2022 1/31/22   Yohana Salvador MD   mupirocin calcium (BACTROBAN) 2 % topical cream Apply  to affected area two (2) times daily as needed (skin irritation). 8/13/19   Yohana Salvador MD   meclizine (ANTIVERT) 12.5 mg tablet Take 1 Tab by mouth three (3) times daily as needed. Take as needed for dizziness. 3/15/18   Yohana Salvador MD   ondansetron (ZOFRAN ODT) 4 mg disintegrating tablet Take 4 mg by mouth every eight (8) hours as needed for Nausea. 3/6/18   Provider, Historical   fluticasone propionate (FLONASE) 50 mcg/actuation nasal spray 2 Sprays by Both Nostrils route daily as needed. Provider, Historical       Physical Exam:   General: alert, no distress   HEENT: Head: Normocephalic, no lesions, without obvious abnormality. Heart: regular rate and rhythm, S1, S2 normal, no murmur, click, rub or gallop   Lungs: chest clear, no wheezing, rales, normal symmetric air entry   Abdominal: Bowel sounds are normal, liver is not enlarged, spleen is not enlarged   Neurological: Grossly normal   Extremities: extremities normal, atraumatic, no cyanosis or edema     Plan of Care/Planned Procedure: EGD    The heart, lungs and mental status were satisfactory for the administration of MAC sedation and for the procedure. Informed consent was obtained for the procedure, including sedation.   Risks of perforation, hemorrhage, adverse drug reaction, and aspiration were discussed. The risks, benefits and alternatives were again reiterated to the patient to include the risk of infection, bleeding, medication reaction, aspiration, perforation which could require immediate surgery, cardiopulmonary complication, issues with anesthesia and death. The patient was counseled at length about the risks of vanesa Covid-19 in the piedad-operative and post-operative states including the recovery window of their procedure. The patient was made aware that vanesa Covid-19 after a surgical procedure may worsen their prognosis for recovering from the virus and lend to a higher morbidity and or mortality risk. The patient was given the options of postponing their procedure. All of the risks, benefits, and alternatives were discussed. The patient does  wish to proceed with the procedure.

## 2022-10-06 NOTE — DISCHARGE INSTRUCTIONS
Ryan Million  [id number]  1935    It was my pleasure seeing you for your procedure. You will also receive a summary report with the findings from this procedure and any further recommendations. If you had polyps removed or biopsies taken during your procedure, you will receive a separate letter from me within the next 2 weeks. If you don't receive this letter or if you have any questions, please call my office [phone]. Please take note of the post procedure instructions listed below. Best Wishes,    Dr. Tete Powell    These instructions give you information on caring for yourself after your procedure. Call your doctor if you have any problems or questions after your procedure. HOME CARE  Walk if you have belly cramping or gas. Walking will help get rid of the air and reduce the bloated feeling in your belly (abdomen). Your IV site (where you received drugs) may be tender to touch. Place warm towels on the site; keep your arm up on two pillows if you have any swelling or soreness in the area. You may shower. ACTIVITY:  Take frequent rest periods and move at a slower pace for the next 24 hours. .  You may resume your regular activity tomorrow if you are feeling back to normal.  Do not drive or ride a bicycle for at least 24 hours (because of the medicine (anesthesia) used during the test). Do not sign any important legal documents or use or operate any machinery for 24 hours  Do not take sleeping medicines/nerve drugs for 24 hours unless the doctor tells you. You can return to work/school tomorrow unless otherwise instructed. NUTRITION:  Drink plenty of fluids to keep your pee (urine) clear or pale yellow  Begin with a light meal and progress to your normal diet. Heavy or fried foods are harder to digest and may make you feel sick to your stomach (nauseated).   Once you are feeling back to normal, you may resume your normal diet as instructed by your doctor. Avoid alcoholic beverages for 24 hours or as instructed. IF YOU HAD BIOPSIES TAKEN OR POLYPS REMOVED DURING THE PROCEDURE:  For the next 7 days, avoid all non-steroidal antiinflammatory medications such as Ibuprofen, Motrin, Advil, Alleve, Fabiola-seltzer, Goody's powder, BC powder. If you do not have an heart condition that requires you to take a daily aspirin, you should avoid taking aspirin for 7 days. Eat a soft diet for 24 hours. Monitor your stools for any blood or dark black, tar-like, stools as this may be a sign of bleeding and if you see any blood, notify your doctor immediately. GET HELP RIGHT AWAY AND SEEK IMMEDIATE MEDICAL CARE IF:  You have more than a spotting of blood in your stool. You pass clumps of tissue (blood clots) or fill the toilet with blood. Your belly is painfully swollen or puffy (abdominal distention). You throw up (vomit). You have a fever. You have redness, pain or swelling at the IV site that last greater than two days. You have abdominal pain or discomfort that is severe or gets worse throughout the day. Post-procedure diagnosis:  Gastritis and Duodenitis    Post-procedure recommendations:          Learning About Coronavirus (COVID-19)  Coronavirus [phone]): Overview  What is coronavirus (COVID-19)? The coronavirus disease (COVID-19) is caused by a virus. It is an illness that was first found in [place], [place], in December 2019. It has since spread worldwide. The virus can cause fever, cough, and trouble breathing. In severe cases, it can cause pneumonia and make it hard to breathe without help. It can cause death. Coronaviruses are a large group of viruses. They cause the common cold. They also cause more serious illnesses like Middle East respiratory syndrome (MERS) and severe acute respiratory syndrome (SARS). COVID-19 is caused by a novel coronavirus. That means it's a new type that has not been seen in people before.   This virus spreads person-to-person through droplets from coughing and sneezing. It can also spread when you are close to someone who is infected. And it can spread when you touch something that has the virus on it, such as a doorknob or a tabletop. What can you do to protect yourself from coronavirus (COVID-19)? The best way to protect yourself from getting sick is to: Avoid areas where there is an outbreak. Avoid contact with people who may be infected. Wash your hands often with soap or alcohol-based hand sanitizers. Avoid crowds and try to stay at least 6 feet away from other people. Wash your hands often, especially after you cough or sneeze. Use soap and water, and scrub for at least 20 seconds. If soap and water aren't available, use an alcohol-based hand . Avoid touching your mouth, nose, and eyes. What can you do to avoid spreading the virus to others? To help avoid spreading the virus to others:  Cover your mouth with a tissue when you cough or sneeze. Then throw the tissue in the trash. Use a disinfectant to clean things that you touch often. Stay home if you are sick or have been exposed to the virus. Don't go to school, work, or public areas. And don't use public transportation. If you are sick:  Leave your home only if you need to get medical care. But call the doctor's office first so they know you're coming. And wear a face mask, if you have one. If you have a face mask, wear it whenever you're around other people. It can help stop the spread of the virus when you cough or sneeze. Clean and disinfect your home every day. Use household  and disinfectant wipes or sprays. Take special care to clean things that you grab with your hands. These include doorknobs, remote controls, phones, and handles on your refrigerator and microwave. And don't forget countertops, tabletops, bathrooms, and computer keyboards.   When to call for help  Call 911 anytime you think you may need emergency care. For example, call if:  You have severe trouble breathing. (You can't talk at all.)  You have constant chest pain or pressure. You are severely dizzy or lightheaded. You are confused or can't think clearly. Your face and lips have a blue color. You pass out (lose consciousness) or are very hard to wake up. Call your doctor now if you develop symptoms such as:  Shortness of breath. Fever. Cough. If you need to get care, call ahead to the doctor's office for instructions before you go. Make sure you wear a face mask, if you have one, to prevent exposing other people to the virus. Where can you get the latest information? The following health organizations are tracking and studying this virus. Their websites contain the most up-to-date information. Lin Mcadams also learn what to do if you think you may have been exposed to the virus. U.S. Centers for Disease Control and Prevention (CDC): The CDC provides updated news about the disease and travel advice. The website also tells you how to prevent the spread of infection. www.cdc.gov  World Health Organization [facility]): WHO offers information about the virus outbreaks. WHO also has travel advice. www.who.int  Current as of: April 1, 2020               Content Version: 12.4  © 2006-2020 [employer]. Care instructions adapted under license by your healthcare professional. If you have questions about a medical condition or this instruction, always ask your healthcare professional. [address] any warranty or liability for your use of this information.     Patient Education on Sedation / Analgesia Administered for Procedure      For 24 hours after general anesthesia or intravenous analgesia / sedation:  Have someone responsible help you with your care  Limit your activities  Do not drive and operate hazardous machinery  Do not make important personal, legal or business decisions  Do not drink alcoholic beverages  If you have not urinated within 8 hours after discharge, please contact your physician  Resume your medications unless otherwise instructed    For 24 hours after general anesthesia or intravenous analgesia / sedation  you may experience:  Drowsiness, dizziness, sleepiness, or confusion  Difficulty remembering or delayed reaction times  Difficulty with your balance, especially while walking, move slowly and carefully, do not make sudden position changes  Difficulty focusing or blurred vision    You may not be aware of slight changes in your behavior and/or your reaction time because of the medication used during and after your procedure.     Report the following to your physician:  Excessive pain, swelling, redness or odor of or around the surgical area  Temperature over 100.5  Nausea and vomiting lasting longer than 4 hours or if unable to take medications  Any signs of decreased circulation or nerve impairment to extremity: change in color, persistent numbness, tingling, coldness or increase pain  Any questions or concerns    IF YOU REPORT TO AN EMERGENCY ROOM, DOCTOR'S OFFICE OR HOSPITAL WITHIN 24 HOURS AFTER YOUR PROCEDURE, BRING THIS SHEET AND YOUR AFTER VISIT SUMMARY WITH YOU AND GIVE IT TO THE PHYSICIAN OR NURSE ATTENDING YOU.

## 2022-10-06 NOTE — PROGRESS NOTES
Endoscopy Case End Note:    1410:  Procedure scope was pre-cleaned, per protocol, at bedside by Ayanna GARCIA      7756:  Report received from anesthesia - Renuka Smith CRNA. See anesthesia flowsheet for intra-procedure vital signs and events. 1412:  Glasses returned to patient. Full dentures transported with patient to recovery room.

## 2022-10-06 NOTE — ANESTHESIA POSTPROCEDURE EVALUATION
Procedure(s):  ESOPHAGOGASTRODUODENOSCOPY (EGD)  ESOPHAGOGASTRODUODENAL (EGD) BIOPSY  ESOPHAGEAL DILATION. general, total IV anesthesia    Anesthesia Post Evaluation        Patient location during evaluation: PACU  Note status: Adequate. Level of consciousness: responsive to verbal stimuli and sleepy but conscious  Pain management: satisfactory to patient  Airway patency: patent  Anesthetic complications: no  Cardiovascular status: acceptable  Respiratory status: acceptable  Hydration status: acceptable  Comments: +Post-Anesthesia Evaluation and Assessment    Patient: Surya Cruz MRN: [id number]  SSN: [id number]   YOB: 1935  Age: 80 y.o. Sex: female      Cardiovascular Function/Vital Signs    BP (!) 148/47   Pulse (!) 59   Temp 36.6 °C (97.8 °F)   Resp 16   Ht 5' 1\" (1.549 m)   Wt 62.3 kg (137 lb 6.4 oz)   SpO2 95%   Breastfeeding No   BMI 25.96 kg/m²     Patient is status post Procedure(s):  ESOPHAGOGASTRODUODENOSCOPY (EGD)  ESOPHAGOGASTRODUODENAL (EGD) BIOPSY  ESOPHAGEAL DILATION. Nausea/Vomiting: Controlled. Postoperative hydration reviewed and adequate. Pain:  Pain Scale 1: Numeric (0 - 10) (10/06/22 1419)  Pain Intensity 1: 0 (10/06/22 1419)   Managed. Neurological Status: At baseline. Mental Status and Level of Consciousness: Arousable. Pulmonary Status:   O2 Device: None (Room air) (10/06/22 1414)   Adequate oxygenation and airway patent. Complications related to anesthesia: None    Post-anesthesia assessment completed. No concerns. Signed By: Hortencia García MD    10/6/2022  Post anesthesia nausea and vomiting:  controlled      INITIAL Post-op Vital signs:   Vitals Value Taken Time   /47 10/06/22 1428   Temp 36.6 °C (97.8 °F) 10/06/22 1419   Pulse 59 10/06/22 1429   Resp 16 10/06/22 1429   SpO2 95 % 10/06/22 1423   Vitals shown include unvalidated device data.

## 2022-10-06 NOTE — PROCEDURES
NAME:  Vaishnavi Walsh   :   1935   MRN:   [id number]     Date/Time:  10/6/2022 2:09 PM    Esophagogastroduodenoscopy (EGD) Procedure Note    : Hayder Harper MD    Staff: Endoscopy Technician-1: Bull Hayes  Endoscopy RN-1: Carine Merritt RN     Referring Provider:  Deyvi Segura MD    Anethesia/Sedation:  MAC anesthesia Propofol    Preoperative diagnosis: DYSPHAGIA, GERD, CONSTIPATION, MULITPLE MYELOMA    Postoperative diagnosis: egd-    Procedure Details     After infom consent was obtained for the procedure, with all risks and benefits of procedure explained the patient was taken to the endoscopy suite and placed in the left lateral decubitus position. Following sequential administration of sedation as per above, the [id number] gastroscope was inserted into the mouth and advanced under direct vision to second portion of the duodenum. A careful inspection was made as the gastroscope was withdrawn, including a retroflexed view of the proximal stomach; findings and interventions are described below. Findings:  Esophagus:Normal esophageal mucosa, biopsied cold forceps minimal bleeding distal and proximal esophagus separately to r/o EOE, GERD, infiltrative disorders like amyloid. Foam throughout esophagus and appeared tortuous. Decision was made to dilate with savory dilators. A spring soft tip guide wife was placed through the scope into the stomach and exchanged in a 1:1 fashion with tech holding secure the wire as the scope was removed out of the mouth. Sequential dilation with 54F, and 60F were each inserted over the wire into the mouth and advanced to 45-50cm without any resistance, removed without blood noted on the dilator after each pass. Post-dilation inspection performed with a revealed a mild dilation effect of the UES with scant oozing and no sign of perforation and neck exam without crepitus.     Stomach:moderate diffuse erythema, congestion, few scattered erosions, biopsied cold forceps with minimal bleeding antrum, incisura, body, cardia, to r/o H pylori and amyloid. Duodenum/jejunum: mild duodenal bulb with erythema, otherwise unremarkable. Biopsied cold forceps minimal bleeding to f/o amyloidosis and sprue             EBL: minimal    Complications:   None; patient tolerated the procedure well. Impression:    See Postoperative diagnosis above    Recommendations:  -Continue acid suppression. , -Await pathology. - left VM on Anne's phone (personalized VM greeting) [phone].     Discharge disposition:  Home in the company of  when able to ambulate    Sidney Newman MD Alert and oriented, no focal deficits, no motor or sensory deficits.

## 2022-10-12 ENCOUNTER — OFFICE VISIT (OUTPATIENT)
Dept: FAMILY MEDICINE CLINIC | Age: 87
End: 2022-10-12
Payer: MEDICARE

## 2022-10-12 VITALS
WEIGHT: 139.8 LBS | HEIGHT: 61 IN | SYSTOLIC BLOOD PRESSURE: 134 MMHG | OXYGEN SATURATION: 100 % | DIASTOLIC BLOOD PRESSURE: 64 MMHG | TEMPERATURE: 98.2 F | RESPIRATION RATE: 12 BRPM | BODY MASS INDEX: 26.39 KG/M2 | HEART RATE: 63 BPM

## 2022-10-12 DIAGNOSIS — N18.32 STAGE 3B CHRONIC KIDNEY DISEASE (HCC): ICD-10-CM

## 2022-10-12 DIAGNOSIS — L70.0 BLACKHEAD: ICD-10-CM

## 2022-10-12 DIAGNOSIS — K21.9 GASTROESOPHAGEAL REFLUX DISEASE, UNSPECIFIED WHETHER ESOPHAGITIS PRESENT: ICD-10-CM

## 2022-10-12 DIAGNOSIS — E78.2 MIXED HYPERLIPIDEMIA: ICD-10-CM

## 2022-10-12 DIAGNOSIS — I10 ESSENTIAL HYPERTENSION: Primary | ICD-10-CM

## 2022-10-12 DIAGNOSIS — M15.9 GENERALIZED OSTEOARTHRITIS: ICD-10-CM

## 2022-10-12 DIAGNOSIS — D64.9 CHRONIC ANEMIA: ICD-10-CM

## 2022-10-12 DIAGNOSIS — E11.21 TYPE 2 DIABETES WITH NEPHROPATHY (HCC): ICD-10-CM

## 2022-10-12 DIAGNOSIS — Z23 NEEDS FLU SHOT: ICD-10-CM

## 2022-10-12 DIAGNOSIS — Z51.81 ENCOUNTER FOR MEDICATION MONITORING: ICD-10-CM

## 2022-10-12 LAB
ANION GAP SERPL CALC-SCNC: 4 MMOL/L (ref 5–15)
BUN SERPL-MCNC: 29 MG/DL (ref 6–20)
BUN/CREAT SERPL: 17 (ref 12–20)
CALCIUM SERPL-MCNC: 9.1 MG/DL (ref 8.5–10.1)
CHLORIDE SERPL-SCNC: 109 MMOL/L (ref 97–108)
CO2 SERPL-SCNC: 26 MMOL/L (ref 21–32)
CREAT SERPL-MCNC: 1.66 MG/DL (ref 0.55–1.02)
GLUCOSE SERPL-MCNC: 157 MG/DL (ref 65–100)
POTASSIUM SERPL-SCNC: 4 MMOL/L (ref 3.5–5.1)
SODIUM SERPL-SCNC: 139 MMOL/L (ref 136–145)

## 2022-10-12 PROCEDURE — G0463 HOSPITAL OUTPT CLINIC VISIT: HCPCS | Performed by: FAMILY MEDICINE

## 2022-10-12 PROCEDURE — G8417 CALC BMI ABV UP PARAM F/U: HCPCS | Performed by: FAMILY MEDICINE

## 2022-10-12 PROCEDURE — G8427 DOCREV CUR MEDS BY ELIG CLIN: HCPCS | Performed by: FAMILY MEDICINE

## 2022-10-12 PROCEDURE — 90694 VACC AIIV4 NO PRSRV 0.5ML IM: CPT | Performed by: FAMILY MEDICINE

## 2022-10-12 PROCEDURE — 99214 OFFICE O/P EST MOD 30 MIN: CPT | Performed by: FAMILY MEDICINE

## 2022-10-12 PROCEDURE — G8536 NO DOC ELDER MAL SCRN: HCPCS | Performed by: FAMILY MEDICINE

## 2022-10-12 PROCEDURE — 1090F PRES/ABSN URINE INCON ASSESS: CPT | Performed by: FAMILY MEDICINE

## 2022-10-12 PROCEDURE — 1123F ACP DISCUSS/DSCN MKR DOCD: CPT | Performed by: FAMILY MEDICINE

## 2022-10-12 PROCEDURE — 1101F PT FALLS ASSESS-DOCD LE1/YR: CPT | Performed by: FAMILY MEDICINE

## 2022-10-12 PROCEDURE — G8510 SCR DEP NEG, NO PLAN REQD: HCPCS | Performed by: FAMILY MEDICINE

## 2022-10-12 PROCEDURE — 3044F HG A1C LEVEL LT 7.0%: CPT | Performed by: FAMILY MEDICINE

## 2022-10-12 NOTE — PROGRESS NOTES
HISTORY OF PRESENT ILLNESS  Carmen Mojica is a 80 y.o. female. HPI  Follow up on chronic medical problems. Overall feeling well. Doing the precautionary measures at home to reduce risks of exposure COVID19. Also wearing mask when she is going out. No known sick contacts or known exposure to [address]. Had EGD schedule for September. Overall her stomach pain has improved. She has not gotten results back from her EGD. .     Cardiovascular Review:  The patient has hypertension, ELEAZAR (but has not been using the CPAP) and hyperlipidemia. Denies chest pain or chest tightness. SOB is at her usual.  No cough or congestion noted. Diet and Lifestyle: generally follows a low fat low cholesterol diet, generally follows a low sodium diet, follows a diabetic diet regularly  Home BP Monitoring: is well controlled at home, ranging 130s's/70-80's. Pertinent ROS: taking medications as instructed, no medication side effects noted, no TIA's, no chest pain on exertion, no swelling of ankles. Diabetes Mellitus:  She has diabetes mellitus. Last A1C 5.8% this past July. Diabetic ROS - diabetic diet compliance: compliant most of the time, home glucose monitoring: is performed regularly, fasting values range low 100s,  Pt does not have BS log with her today, further diabetic ROS: no polyuria or polydipsia, no chest pain, dyspnea or TIA's, no numbness, tingling or pain in extremities, no unusual visual symptoms, no hypoglycemia. Her appetite is good. Lab review: orders written for new lab studies as appropriate; see orders. Chronic anemia and multiple myeloma in remission is being followed by hematology. Osteoarthritis:  Patient has osteoarthritis. She also had known spinal stenosis. She has been having pains in several joints that is migratory and comes and goes. Overall the pain in the joints and lower back has been doing good with minimal pain. Symptoms onset: problem is longstanding.   Rheumatological ROS: stable, mild-to-moderate joint symptoms intermittently, reasonably well controlled by PRN meds. Response to treatment plan: stable and intermittent. HM:  Mammogram 12/30/21. No further testing needed. Colonoscopy 7/15/2019 by  Raj Gowers and was told she did not need another unless she had a problem. Patient Active Problem List   Diagnosis Code    Constipation K59.00    Gout M10.9    Spinal stenosis M48.00    Chronic anemia D64.9    Multiple myeloma, without mention of having achieved remission C90.00    Benign neoplasm of adrenal gland D35.00    Essential hypertension, malignant I10    Mixed hyperlipidemia E78.2    ELEAZAR on CPAP G47.33, Z99.89    Encounter for medication monitoring Z51.81    Type 2 diabetes mellitus without complication (HCC) O45.4    Type 2 diabetes with nephropathy (HCC) E11.21    WALKER (dyspnea on exertion) R06.09       Current Outpatient Medications   Medication Sig Dispense Refill    famotidine (PEPCID) 20 mg tablet  (Patient not taking: Reported on 10/5/2022)      polyethylene glycol (MIRALAX) 17 gram packet Take 1 Packet by mouth daily. cloNIDine HCL (CATAPRES) 0.3 mg tablet Take 1 Tablet by mouth nightly. Take 0.3 mg by mouth nightly. 90 Tablet 3    chlorthalidone (HYGROTON) 25 mg tablet TAKE 1 TABLET BY MOUTH  TWICE DAILY 180 Tablet 3    allopurinoL (ZYLOPRIM) 100 mg tablet TAKE 1 TABLET BY MOUTH  DAILY 90 Tablet 3    metFORMIN ER (GLUCOPHAGE XR) 500 mg tablet TAKE 1 TABLET BY MOUTH  DAILY WITH DINNER 90 Tablet 3    minoxidiL (LONITEN) 10 mg tablet TAKE ONE-HALF TABLET BY  MOUTH DAILY 45 Tablet 3    pantoprazole (PROTONIX) 40 mg tablet Take 1 Tablet by mouth daily.  Take prior to breakfast (Patient not taking: Reported on 10/5/2022) 30 Tablet 3    rosuvastatin (CRESTOR) 5 mg tablet TAKE 1 TABLET BY MOUTH  NIGHTLY 90 Tablet 3    amLODIPine (NORVASC) 10 mg tablet TAKE 1 TABLET BY MOUTH ONCE DAILY 90 Tablet 3    spironolactone (ALDACTONE) 25 mg tablet TAKE 1 TABLET BY MOUTH TWICE DAILY 180 Tablet 3    metoprolol tartrate (LOPRESSOR) 100 mg IR tablet TAKE 1 TABLET BY MOUTH  TWICE DAILY 180 Tablet 3    mupirocin calcium (BACTROBAN) 2 % topical cream Apply  to affected area two (2) times daily as needed (skin irritation). 30 g 1    meclizine (ANTIVERT) 12.5 mg tablet Take 1 Tab by mouth three (3) times daily as needed. Take as needed for dizziness. 30 Tab 0    ondansetron (ZOFRAN ODT) 4 mg disintegrating tablet Take 4 mg by mouth every eight (8) hours as needed for Nausea. fluticasone propionate (FLONASE) 50 mcg/actuation nasal spray 2 Sprays by Both Nostrils route daily as needed. cyanocobalamin (VITAMIN B12) 100 mcg tablet Take 100 mcg by mouth daily. EPOETIN BRYAN (PROCRIT IJ) 1,000 Units by Injection route as needed. Depending on blood count      omega-3 fatty acids-vitamin e 1,000 mg cap Take 1 Tab by mouth daily. cholecalciferol (VITAMIN D3) (1000 Units /25 mcg) tablet Take 1,000 Units by mouth daily. Allergies   Allergen Reactions    Pcn [Penicillins] Swelling     tongue    Bactrim [Sulfamethoxazole-Trimethoprim] Diarrhea    Ceftin [Cefuroxime Axetil] Diarrhea    Hydralazine Other (comments)     edema    Sulfa (Sulfonamide Antibiotics) Itching         Past Medical History:   Diagnosis Date    Anemia NEC     Benign neoplasm of adrenal gland     Chronic anemia 6/1/2010    Constipation     on a daily stool softener which pt states helps as of 4/27/16    Diabetes ([address]     WALKER (dyspnea on exertion)     as of 4/27/16 pt states this is her baseline and is not getting any worse    GERD (gastroesophageal reflux disease)     Goiter 2011    as of 4/27/16 Pt states \"I am not even sure I have it\".   Pt denies any problems    Gout 6/1/2010    as of 4/27/16 pt denies any sx    HTN (hypertension) 6/1/2010    followed by cardiology Dr Virginia Jonas  [phone]    Multiple myeloma, without mention of having achieved remission Dx 11/1/99    followed by Robby Davidson hypercholesterolemia 2010    Sleep apnea 2010    no CPAP    Spinal stenosis 2010         Past Surgical History:   Procedure Laterality Date    COLONOSCOPY N/A 7/15/2019    flex sig , polypectomy performed by Bruno Reno [facility] at [facility]    HX HYSTERECTOMY  1970s    HX ORTHOPAEDIC      left hand-trigger finger    HX ORTHOPAEDIC      right hand- trigger finger    MN COLONOSCOPY FLX DX W/COLLJ SPEC WHEN PFRMD  08/10/2010    Naresh Williams         Family History   Problem Relation Age of Onset    Hypertension Mother     No Known Problems Father     Cancer Sister 52        colon     Diabetes Sister     Kidney Disease Sister     Hypertension Sister     Elevated Lipids Sister        Social History     Tobacco Use    Smoking status: Former     Types: Cigarettes     Quit date: 1980     Years since quittin.8    Smokeless tobacco: Never   Substance Use Topics    Alcohol use: No     Alcohol/week: 0.0 standard drinks        Lab Results   Component Value Date/Time    WBC 4.0 2022 10:26 AM    HGB 10.8 (L) 2022 10:26 AM    HCT 33.5 (L) 2022 10:26 AM    PLATELET 926  10:26 AM    MCV 93.1 2022 10:26 AM     Lab Results   Component Value Date/Time    Cholesterol, total 101 2022 10:26 AM    HDL Cholesterol 37 2022 10:26 AM    LDL, calculated 46.2 2022 10:26 AM    LDL-C, External 57 10/14/2015 12:00 AM    Triglyceride 89 2022 10:26 AM    CHOL/HDL Ratio 2.7 2022 10:26 AM     Lab Results   Component Value Date/Time    TSH 2.860 2011 09:29 AM      Lab Results   Component Value Date/Time    Sodium 137 2022 10:26 AM    Potassium 5.2 (H) 2022 10:26 AM    Chloride 112 (H) 2022 10:26 AM    CO2 21 2022 10:26 AM    Anion gap 4 (L) 2022 10:26 AM    Glucose 136 (H) 2022 10:26 AM    BUN 41 (H) 2022 10:26 AM    Creatinine 1.93 (H) 2022 10:26 AM    BUN/Creatinine ratio 21 (H) 2022 10:26 AM GFR est AA 30 (L) 07/13/2022 10:26 AM    GFR est non-AA 25 (L) 07/13/2022 10:26 AM    Calcium 9.2 07/13/2022 10:26 AM    Bilirubin, total 0.4 07/13/2022 10:26 AM    ALT (SGPT) 7 (L) 07/13/2022 10:26 AM    Alk. phosphatase 60 07/13/2022 10:26 AM    Protein, total 8.8 (H) 07/13/2022 10:26 AM    Albumin 3.4 (L) 07/13/2022 10:26 AM    Globulin 5.4 (H) 07/13/2022 10:26 AM    A-G Ratio 0.6 (L) 07/13/2022 10:26 AM      Lab Results   Component Value Date/Time    Hemoglobin A1c 6.2 (H) 10/27/2020 12:27 PM    Hemoglobin A1c (POC) 5.8 07/13/2022 09:53 AM    Hemoglobin A1c, External 6.4 10/14/2015 12:00 AM         Review of Systems   Constitutional:  Negative for malaise/fatigue. HENT:  Negative for congestion. Eyes:  Negative for blurred vision. Respiratory:  Negative for cough and shortness of breath. Cardiovascular:  Negative for chest pain, palpitations and leg swelling. Gastrointestinal:  Negative for abdominal pain, constipation and heartburn. Genitourinary:  Negative for dysuria, frequency and urgency. Musculoskeletal:  Negative for back pain and joint pain. Skin:         Has blackhead on the right upper back that has gotten to be painful when hitting her clothers. Neurological:  Negative for dizziness, tingling and headaches. Endo/Heme/Allergies:  Negative for environmental allergies. Psychiatric/Behavioral:  Negative for depression. The patient does not have insomnia. Physical Exam  Vitals and nursing note reviewed. Constitutional:       Appearance: Normal appearance. She is well-developed. Comments: /64   Pulse 63   Temp 98.2 °F (36.8 °C)   Resp 12   Ht 5' 1\" (1.549 m)   Wt 139 lb 12.8 oz (63.4 kg)   SpO2 100%   BMI 26.41 kg/m²    HENT:      Right Ear: Tympanic membrane and ear canal normal.      Left Ear: Tympanic membrane and ear canal normal.   Neck:      Thyroid: No thyromegaly. Cardiovascular:      Rate and Rhythm: Normal rate and regular rhythm.       Heart sounds: Normal heart sounds. Pulmonary:      Effort: Pulmonary effort is normal.      Breath sounds: Normal breath sounds. Abdominal:      General: Bowel sounds are normal.      Palpations: Abdomen is soft. There is no mass. Tenderness: There is no abdominal tenderness. Musculoskeletal:         General: Normal range of motion. Cervical back: Normal range of motion and neck supple. Right lower leg: No edema. Left lower leg: No edema. Lymphadenopathy:      Cervical: No cervical adenopathy. Skin:     General: Skin is warm and dry. Comments: Irritated large blackhead in the right upper back. Neurological:      General: No focal deficit present. Mental Status: She is alert and oriented to person, place, and time. Psychiatric:         Mood and Affect: Mood normal.     ASSESSMENT and PLAN  Diagnoses and all orders for this visit:    1. Essential hypertension  Stable     2. Type 2 diabetes with nephropathy (HCC)  Stable     3. Mixed hyperlipidemia  Stable   Continue to monitor. Work on diet and exercise. 4. Stage 3b chronic kidney disease ([facility] [address]  Has follow up with renal in December. 5. Chronic anemia  Has been stable. Still getting epo injections per hematology     6. Gastroesophageal reflux disease, unspecified whether esophagitis present  Advised pt to follow up her results from her EGD. 7. Generalized osteoarthritis  Stable     8. [address]    9. Encounter for medication monitoring  -     METABOLIC PANEL, BASIC; Future      Follow-up and Dispositions    Return in about 4 months (around 2/12/2023). current treatment plan is effective, no change in therapy  reviewed diet, exercise and weight control  cardiovascular risk and specific lipid/LDL goals reviewed  reviewed medications and side effects in detail    I have discussed diagnosis listed in this note with pt and/or family.  I have discussed treatment plans and options and the risk/benefit analysis of those options, including safe use of medications and possible medication side effects. Through the use of shared decision making we have agreed to the above plan. The patient has received an after-visit summary and questions were answered concerning future plans and follow up. Advise pt of any urgent changes then to proceed to the ER.

## 2022-10-12 NOTE — PROGRESS NOTES
Patient identified by 2 identifiers. Chief Complaint   Patient presents with    Follow-up     1. Have you been to the ER, urgent care clinic since your last visit? Hospitalized since your last visit? No    2. Have you seen or consulted any other health care providers outside of the [address] [place] [address] since your last visit? Include any pap smears or colon screening. No    Verbal Order with Readback given by Sorin Bess MD for Influenza. Given in left Deltoid without difficulty.

## 2022-10-12 NOTE — PATIENT INSTRUCTIONS
Vaccine Information Statement    Influenza (Flu) Vaccine (Inactivated or Recombinant): What You Need to Know    Many vaccine information statements are available in Urdu and other languages. See www.immunize.org/vis. Hojas de información sobre vacunas están disponibles en español y en muchos otros idiomas. Visite www.immunize.org/vis. 1. Why get vaccinated? Influenza vaccine can prevent influenza (flu). Flu is a contagious disease that spreads around the [facility] every year, usually between October and May. Anyone can get the flu, but it is more dangerous for some people. Infants and young children, people 72 years and older, pregnant people, and people with certain health conditions or a weakened immune system are at greatest risk of flu complications. Pneumonia, bronchitis, sinus infections, and ear infections are examples of flu-related complications. If you have a medical condition, such as heart disease, cancer, or diabetes, flu can make it worse. Flu can cause fever and chills, sore throat, muscle aches, fatigue, cough, headache, and runny or stuffy nose. Some people may have vomiting and diarrhea, though this is more common in children than adults. In an average year, thousands of people in the [facility] die from flu, and many more are hospitalized. Flu vaccine prevents millions of illnesses and flu-related visits to the doctor each year. 2. Influenza vaccines     CDC recommends everyone 6 months and older get vaccinated every flu season. Children 6 months through 6years of age may need 2 doses during a single flu season. Everyone else needs only 1 dose each flu season. It takes about 2 weeks for protection to develop after vaccination. There are many flu viruses, and they are always changing. Each year a new flu vaccine is made to protect against the influenza viruses believed to be likely to cause disease in the upcoming flu season.  Even when the vaccine doesnt exactly match these viruses, it may still provide some protection. Influenza vaccine does not cause flu. Influenza vaccine may be given at the same time as other vaccines. 3. Talk with your health care provider    Tell your vaccination provider if the person getting the vaccine:  Has had an allergic reaction after a previous dose of influenza vaccine, or has any severe, life-threatening allergies   Has ever had Guillain-Barré Syndrome (also called GBS)    In some cases, your health care provider may decide to postpone influenza vaccination until a future visit. Influenza vaccine can be administered at any time during pregnancy. People who are or will be pregnant during influenza season should receive inactivated influenza vaccine. People with minor illnesses, such as a cold, may be vaccinated. People who are moderately or severely ill should usually wait until they recover before getting influenza vaccine. Your health care provider can give you more information. 4. Risks of a vaccine reaction    Soreness, redness, and swelling where the shot is given, fever, muscle aches, and headache can happen after influenza vaccination. There may be a very small increased risk of Guillain-Barré Syndrome (GBS) after inactivated influenza vaccine (the flu shot). Veronica Dougherty children who get the flu shot along with pneumococcal vaccine (PCV13) and/or DTaP vaccine at the same time might be slightly more likely to have a seizure caused by fever. Tell your health care provider if a child who is getting flu vaccine has ever had a seizure. People sometimes faint after medical procedures, including vaccination. Tell your provider if you feel dizzy or have vision changes or ringing in the ears. As with any medicine, there is a very remote chance of a vaccine causing a severe allergic reaction, other serious injury, or death. 5. What if there is a serious problem?     An allergic reaction could occur after the vaccinated person leaves the clinic. If you see signs of a severe allergic reaction (hives, swelling of the face and throat, difficulty breathing, a fast heartbeat, dizziness, or weakness), call 9-1-1 and get the person to the nearest hospital.    For other signs that concern you, call your health care provider. Adverse reactions should be reported to the Vaccine Adverse Event Reporting System (VAERS). Your health care provider will usually file this report, or you can do it yourself. Visit the VAERS website at www.vaers. [facility].gov or call 3-272.457.1632. VAERS is only for reporting reactions, and VAERS staff members do not give medical advice. 6. The National Vaccine Injury Compensation Program    The [facility] Vaccine Injury Compensation Program (VICP) is a federal program that was created to compensate people who may have been injured by certain vaccines. Claims regarding alleged injury or death due to vaccination have a time limit for filing, which may be as short as two years. Visit the VICP website at www.[facility].gov/vaccinecompensation or call 1-709.787.4924 to learn about the program and about filing a claim. 7. How can I learn more? Ask your health care provider. Call your local or state health department. Visit the website of the Food and Drug Administration (FDA) for vaccine package inserts and additional information at www.fda.gov/vaccines-blood-biologics/vaccines. Contact the Centers for Disease Control and Prevention (CDC): Call 2-245.579.5186 (1-800-CDC-INFO) or  Visit CDCs influenza website at www.cdc.gov/flu. Vaccine Information Statement   Inactivated Influenza Vaccine   8/6/2021  [address]   Department of Health and Human Services  Centers for Disease Control and Prevention    Office Use Only

## 2022-11-02 RX ORDER — METOPROLOL TARTRATE 100 MG/1
TABLET ORAL
Qty: 180 TABLET | Refills: 3 | Status: SHIPPED | OUTPATIENT
Start: 2022-11-02

## 2022-11-02 RX ORDER — AMLODIPINE BESYLATE 10 MG/1
TABLET ORAL
Qty: 90 TABLET | Refills: 3 | Status: SHIPPED | OUTPATIENT
Start: 2022-11-02

## 2022-11-02 RX ORDER — SPIRONOLACTONE 25 MG/1
TABLET ORAL
Qty: 180 TABLET | Refills: 3 | Status: SHIPPED | OUTPATIENT
Start: 2022-11-02

## 2022-11-29 ENCOUNTER — TRANSCRIBE ORDER (OUTPATIENT)
Dept: SCHEDULING | Age: 87
End: 2022-11-29

## 2022-11-29 DIAGNOSIS — D50.9 IRON DEFICIENCY ANEMIA, UNSPECIFIED: ICD-10-CM

## 2022-11-29 DIAGNOSIS — C90.00 MULTIPLE MYELOMA NOT HAVING ACHIEVED REMISSION (HCC): Primary | ICD-10-CM

## 2022-12-28 ENCOUNTER — HOSPITAL ENCOUNTER (OUTPATIENT)
Dept: CT IMAGING | Age: 87
Discharge: HOME | End: 2022-12-28
Attending: INTERNAL MEDICINE
Payer: MEDICARE

## 2022-12-28 VITALS
HEIGHT: 61 IN | HEART RATE: 61 BPM | RESPIRATION RATE: 14 BRPM | SYSTOLIC BLOOD PRESSURE: 140 MMHG | OXYGEN SATURATION: 97 % | DIASTOLIC BLOOD PRESSURE: 96 MMHG | BODY MASS INDEX: 26.43 KG/M2 | TEMPERATURE: 98.7 F | WEIGHT: 140 LBS

## 2022-12-28 DIAGNOSIS — D50.9 IRON DEFICIENCY ANEMIA, UNSPECIFIED: ICD-10-CM

## 2022-12-28 DIAGNOSIS — C90.00 MULTIPLE MYELOMA NOT HAVING ACHIEVED REMISSION (HCC): ICD-10-CM

## 2022-12-28 LAB
BASOPHILS # BLD: 0 K/UL (ref 0–0.1)
BASOPHILS NFR BLD: 1 % (ref 0–1)
DIFFERENTIAL METHOD BLD: ABNORMAL
EOSINOPHIL # BLD: 0.2 K/UL (ref 0–0.4)
EOSINOPHIL NFR BLD: 6 % (ref 0–7)
ERYTHROCYTE [DISTWIDTH] IN BLOOD BY AUTOMATED COUNT: 15.7 % (ref 11.5–14.5)
HCT VFR BLD AUTO: 29.5 % (ref 35–47)
HGB BLD-MCNC: 9.5 G/DL (ref 11.5–16)
IMM GRANULOCYTES # BLD AUTO: 0 K/UL (ref 0–0.04)
IMM GRANULOCYTES NFR BLD AUTO: 0 % (ref 0–0.5)
LYMPHOCYTES # BLD: 0.8 K/UL (ref 0.8–3.5)
LYMPHOCYTES NFR BLD: 19 % (ref 12–49)
MCH RBC QN AUTO: 29.5 PG (ref 26–34)
MCHC RBC AUTO-ENTMCNC: 32.2 G/DL (ref 30–36.5)
MCV RBC AUTO: 91.6 FL (ref 80–99)
MONOCYTES # BLD: 0.3 K/UL (ref 0–1)
MONOCYTES NFR BLD: 7 % (ref 5–13)
NEUTS SEG # BLD: 3 K/UL (ref 1.8–8)
NEUTS SEG NFR BLD: 68 % (ref 32–75)
NRBC # BLD: 0 K/UL (ref 0–0.01)
NRBC BLD-RTO: 0 PER 100 WBC
PLATELET # BLD AUTO: 127 K/UL (ref 150–400)
PMV BLD AUTO: 11.2 FL (ref 8.9–12.9)
RBC # BLD AUTO: 3.22 M/UL (ref 3.8–5.2)
WBC # BLD AUTO: 4.4 K/UL (ref 3.6–11)

## 2022-12-28 PROCEDURE — 88364 INSITU HYBRIDIZATION (FISH): CPT

## 2022-12-28 PROCEDURE — 88311 DECALCIFY TISSUE: CPT

## 2022-12-28 PROCEDURE — 77030014115

## 2022-12-28 PROCEDURE — 88365 INSITU HYBRIDIZATION (FISH): CPT

## 2022-12-28 PROCEDURE — 85025 COMPLETE CBC W/AUTO DIFF WBC: CPT

## 2022-12-28 PROCEDURE — 74011250636 HC RX REV CODE- 250/636: Performed by: RADIOLOGY

## 2022-12-28 PROCEDURE — 88305 TISSUE EXAM BY PATHOLOGIST: CPT

## 2022-12-28 PROCEDURE — 88342 IMHCHEM/IMCYTCHM 1ST ANTB: CPT

## 2022-12-28 PROCEDURE — 36415 COLL VENOUS BLD VENIPUNCTURE: CPT

## 2022-12-28 PROCEDURE — 88313 SPECIAL STAINS GROUP 2: CPT

## 2022-12-28 PROCEDURE — 77030018781

## 2022-12-28 PROCEDURE — 38221 DX BONE MARROW BIOPSIES: CPT

## 2022-12-28 PROCEDURE — 77030028872 HC BN BIOP NDL ON CNTRL TY TELE -C

## 2022-12-28 RX ORDER — SODIUM CHLORIDE 9 MG/ML
25 INJECTION, SOLUTION INTRAVENOUS
Status: COMPLETED | OUTPATIENT
Start: 2022-12-28 | End: 2022-12-28

## 2022-12-28 RX ORDER — FENTANYL CITRATE 50 UG/ML
25-100 INJECTION, SOLUTION INTRAMUSCULAR; INTRAVENOUS
Status: DISCONTINUED | OUTPATIENT
Start: 2022-12-28 | End: 2022-12-28

## 2022-12-28 RX ORDER — MIDAZOLAM HYDROCHLORIDE 1 MG/ML
.25-5 INJECTION, SOLUTION INTRAMUSCULAR; INTRAVENOUS
Status: DISCONTINUED | OUTPATIENT
Start: 2022-12-28 | End: 2022-12-28

## 2022-12-28 RX ADMIN — MIDAZOLAM 2 MG: 1 INJECTION INTRAMUSCULAR; INTRAVENOUS at 10:33

## 2022-12-28 RX ADMIN — FENTANYL CITRATE 50 MCG: 50 INJECTION, SOLUTION INTRAMUSCULAR; INTRAVENOUS at 10:33

## 2022-12-28 RX ADMIN — MIDAZOLAM 1 MG: 1 INJECTION INTRAMUSCULAR; INTRAVENOUS at 10:37

## 2022-12-28 RX ADMIN — SODIUM CHLORIDE 25 ML/HR: 9 INJECTION, SOLUTION INTRAVENOUS at 10:33

## 2022-12-28 NOTE — DISCHARGE INSTRUCTIONS
[facility]  Radiology Department  [phone]    Radiologist: Dr. Alexa Mayen    Date: 12-28-22       Bone Marrow Biopsy Discharge Instructions      Go home and rest  and restrict your activity the next 24 hours. You have been given sedating medications, so do not drive or make important decisions today. Resume your previous diet and prescribed medications. You may shower in 24 hours. Do not soak or swim until the biopsy site has healed completely to minimize any risk of infection. Watch site for redness, drainage, pus, foul odor, increasing pain and fevers. Should this occur call you doctor immediatly. You may take Tylenol if allowed, as directed on the label, for pain or discomfort. Avoid Ibuprofen (Advil, Motrin etc.) and Aspirin today as they may increase your risk of bleeding. Be sure to follow up with your physician, and let him know how you are progressing and to receive your results. If you have any questions about your procedure today please call and ask to speak to a radiology nurse.        I

## 2022-12-28 NOTE — ROUTINE PROCESS
1    Pt arrived to [facility] bone marrow biopsy. Pt is A/O x4 with no complaints of pain. Consent and VS to be obtained. Call bell within reach and bed in the lowest position. 1043:    Name of procedure: Bone Marrow Biopsy    Sedation medications given:    Versed: 3mg    Fentanyl: 50 mcg    Sedation tolerated: Well    Sedation start:  1030  Sedation end:  1043    Vital Signs: Stable    Fluids removed: None    Samples sent to lab: Sent to lab via Pat Moncada in Cytology    Any complications related to procedure: None    Post Procedure Care Needed/order sets placed in [facility] care. None    1140:    Pt d/c home. I reviewed all d/c instructions,  and medications. Pt stated understanding. Removed all IV/tele. Pt left with all personal belongings.

## 2022-12-28 NOTE — H&P
Interventional and Vascular Radiology History and Physical    Patient: Betzy Santos 80 y.o. female       Chief Complaint: No chief complaint on file. History of Present Illness: myeloma     History:    Past Medical History:   Diagnosis Date    Anemia NEC     Benign neoplasm of adrenal gland     Chronic anemia 2010    Constipation     on a daily stool softener which pt states helps as of 16    Diabetes ([address]     WALKER (dyspnea on exertion)     as of 16 pt states this is her baseline and is not getting any worse    GERD (gastroesophageal reflux disease)     Goiter     as of 16 Pt states \"I am not even sure I have it\".   Pt denies any problems    Gout 2010    as of 16 pt denies any sx    HTN (hypertension) 2010    followed by cardiology Dr Mila Walter  [phone]    Multiple myeloma, without mention of having achieved remission Dx 99    followed by Ryland Olszewski    Pure hypercholesterolemia 2010    Sleep apnea 2010    no CPAP    Spinal stenosis 2010     Family History   Problem Relation Age of Onset    Hypertension Mother     No Known Problems Father     Cancer Sister 52        colon     Diabetes Sister     Kidney Disease Sister     Hypertension Sister     Elevated Lipids Sister      Social History     Socioeconomic History    Marital status:      Spouse name: Not on file    Number of children: Not on file    Years of education: Not on file    Highest education level: Not on file   Occupational History    Not on file   Tobacco Use    Smoking status: Former     Types: Cigarettes     Quit date: 1980     Years since quittin.0    Smokeless tobacco: Never   Vaping Use    Vaping Use: Never used   Substance and Sexual Activity    Alcohol use: No     Alcohol/week: 0.0 standard drinks    Drug use: No    Sexual activity: Not Currently   Other Topics Concern    Not on file   Social History Narrative    Lives with  in one story home     Social Determinants of Health     Financial Resource Strain: Not on file   Food Insecurity: Not on file   Transportation Needs: Not on file   Physical Activity: Not on file   Stress: Not on file   Social Connections: Not on file   Intimate Partner Violence: Not on file   Housing Stability: Not on file       Allergies: Allergies   Allergen Reactions    Pcn [Penicillins] Swelling     tongue    Bactrim [Sulfamethoxazole-Trimethoprim] Diarrhea    Ceftin [Cefuroxime Axetil] Diarrhea    Hydralazine Other (comments)     edema    Penicillin Unknown (comments)    Sulfa (Sulfonamide Antibiotics) Itching       Current Medications:  Current Outpatient Medications   Medication Sig    metoprolol tartrate (LOPRESSOR) 100 mg IR tablet TAKE 1 TABLET BY MOUTH  TWICE DAILY    amLODIPine (NORVASC) 10 mg tablet TAKE 1 TABLET BY MOUTH ONCE DAILY    spironolactone (ALDACTONE) 25 mg tablet TAKE 1 TABLET BY MOUTH  TWICE DAILY    polyethylene glycol (MIRALAX) 17 gram packet Take 1 Packet by mouth daily. cloNIDine HCL (CATAPRES) 0.3 mg tablet Take 1 Tablet by mouth nightly. Take 0.3 mg by mouth nightly. chlorthalidone (HYGROTON) 25 mg tablet TAKE 1 TABLET BY MOUTH  TWICE DAILY    allopurinoL (ZYLOPRIM) 100 mg tablet TAKE 1 TABLET BY MOUTH  DAILY    metFORMIN ER (GLUCOPHAGE XR) 500 mg tablet TAKE 1 TABLET BY MOUTH  DAILY WITH DINNER    minoxidiL (LONITEN) 10 mg tablet TAKE ONE-HALF TABLET BY  MOUTH DAILY    rosuvastatin (CRESTOR) 5 mg tablet TAKE 1 TABLET BY MOUTH  NIGHTLY    mupirocin calcium (BACTROBAN) 2 % topical cream Apply  to affected area two (2) times daily as needed (skin irritation). meclizine (ANTIVERT) 12.5 mg tablet Take 1 Tab by mouth three (3) times daily as needed. Take as needed for dizziness. ondansetron (ZOFRAN ODT) 4 mg disintegrating tablet Take 4 mg by mouth every eight (8) hours as needed for Nausea. fluticasone propionate (FLONASE) 50 mcg/actuation nasal spray 2 Sprays by Both Nostrils route daily as needed. cyanocobalamin (VITAMIN B12) 100 mcg tablet Take 100 mcg by mouth daily. EPOETIN BRYAN (PROCRIT IJ) 1,000 Units by Injection route as needed. Depending on blood count    omega-3 fatty acids-vitamin e 1,000 mg cap Take 1 Tab by mouth daily. cholecalciferol (VITAMIN D3) (1000 Units /25 mcg) tablet Take 1,000 Units by mouth daily. Current Facility-Administered Medications   Medication Dose Route Frequency    midazolam (VERSED) injection 0.25-5 mg  0.25-5 mg IntraVENous Multiple    fentaNYL citrate (PF) injection  mcg   mcg IntraVENous Rad Multiple        Physical Exam:  Blood pressure (!) 141/48, pulse (!) 52, temperature 98.7 °F (37.1 °C), resp. rate 11, height 5' 1\" (1.549 m), weight 63.5 kg (140 lb), SpO2 95 %, not currently breastfeeding. LUNG: clear to auscultation bilaterally, HEART: regular rate and rhythm, S1, S2 normal, no murmur, click, rub or gallop      Alerts:    Hospital Problems  Date Reviewed: 10/5/2022   None      Laboratory:      Recent Labs     12/28/22  1008   HGB 9.5*   HCT 29.5*   WBC 4.4   *         Plan of Care/Planned Procedure:  Risks, benefits, and alternatives reviewed with patient and she agrees to proceed with the procedure. Conscious sedation will be performed with IV fentanyl and versed.  Plan is for bone marrow biopsy      Gaila Snellen, MD

## 2023-01-06 ENCOUNTER — TRANSCRIBE ORDER (OUTPATIENT)
Dept: SCHEDULING | Age: 88
End: 2023-01-06

## 2023-01-06 DIAGNOSIS — C90.00 MULTIPLE MYELOMA NOT HAVING ACHIEVED REMISSION (HCC): Primary | ICD-10-CM

## 2023-01-06 DIAGNOSIS — D50.9 IRON DEFICIENCY ANEMIA, UNSPECIFIED: ICD-10-CM

## 2023-01-25 DIAGNOSIS — E11.9 TYPE 2 DIABETES MELLITUS WITHOUT COMPLICATION, WITHOUT LONG-TERM CURRENT USE OF INSULIN (HCC): ICD-10-CM

## 2023-01-25 DIAGNOSIS — E78.2 MIXED HYPERLIPIDEMIA: ICD-10-CM

## 2023-01-26 RX ORDER — ROSUVASTATIN CALCIUM 5 MG/1
TABLET, COATED ORAL
Qty: 90 TABLET | Refills: 3 | Status: SHIPPED | OUTPATIENT
Start: 2023-01-26

## 2023-01-26 RX ORDER — METFORMIN HYDROCHLORIDE 500 MG/1
TABLET, EXTENDED RELEASE ORAL
Qty: 90 TABLET | Refills: 3 | Status: SHIPPED | OUTPATIENT
Start: 2023-01-26

## 2023-01-27 ENCOUNTER — HOSPITAL ENCOUNTER (OUTPATIENT)
Dept: PET IMAGING | Age: 88
End: 2023-01-27
Attending: INTERNAL MEDICINE
Payer: MEDICARE

## 2023-01-27 ENCOUNTER — HOSPITAL ENCOUNTER (OUTPATIENT)
Dept: PET IMAGING | Age: 88
Discharge: HOME | End: 2023-01-27
Attending: INTERNAL MEDICINE
Payer: MEDICARE

## 2023-01-27 VITALS — WEIGHT: 140 LBS | HEIGHT: 61 IN | BODY MASS INDEX: 26.43 KG/M2

## 2023-01-27 DIAGNOSIS — D50.9 IRON DEFICIENCY ANEMIA, UNSPECIFIED: ICD-10-CM

## 2023-01-27 DIAGNOSIS — C90.00 MULTIPLE MYELOMA NOT HAVING ACHIEVED REMISSION (HCC): ICD-10-CM

## 2023-01-27 LAB
GLUCOSE BLD STRIP.AUTO-MCNC: 117 MG/DL (ref 65–117)
SERVICE CMNT-IMP: NORMAL

## 2023-01-27 PROCEDURE — A9552 F18 FDG: HCPCS

## 2023-01-27 RX ORDER — FLUDEOXYGLUCOSE F-18 200 MCI/ML
10 INJECTION INTRAVENOUS ONCE
Status: COMPLETED | OUTPATIENT
Start: 2023-01-27 | End: 2023-01-27

## 2023-01-27 RX ADMIN — FLUDEOXYGLUCOSE F-18 9.57 MILLICURIE: 200 INJECTION INTRAVENOUS at 07:49

## 2023-02-01 ENCOUNTER — TRANSCRIBE ORDER (OUTPATIENT)
Dept: SCHEDULING | Age: 88
End: 2023-02-01

## 2023-02-01 DIAGNOSIS — E87.5 HYPERKALEMIA: ICD-10-CM

## 2023-02-01 DIAGNOSIS — C90.00 MULTIPLE MYELOMA NOT HAVING ACHIEVED REMISSION (HCC): Primary | ICD-10-CM

## 2023-02-01 DIAGNOSIS — D50.9 IRON DEFICIENCY ANEMIA, UNSPECIFIED: ICD-10-CM

## 2023-02-13 ENCOUNTER — OFFICE VISIT (OUTPATIENT)
Dept: FAMILY MEDICINE CLINIC | Age: 88
End: 2023-02-13
Payer: MEDICARE

## 2023-02-13 VITALS
BODY MASS INDEX: 26.7 KG/M2 | DIASTOLIC BLOOD PRESSURE: 61 MMHG | TEMPERATURE: 97.7 F | SYSTOLIC BLOOD PRESSURE: 156 MMHG | WEIGHT: 141.4 LBS | RESPIRATION RATE: 16 BRPM | HEART RATE: 50 BPM | HEIGHT: 61 IN | OXYGEN SATURATION: 99 %

## 2023-02-13 DIAGNOSIS — K21.9 GASTROESOPHAGEAL REFLUX DISEASE, UNSPECIFIED WHETHER ESOPHAGITIS PRESENT: ICD-10-CM

## 2023-02-13 DIAGNOSIS — E78.2 MIXED HYPERLIPIDEMIA: ICD-10-CM

## 2023-02-13 DIAGNOSIS — D64.9 CHRONIC ANEMIA: ICD-10-CM

## 2023-02-13 DIAGNOSIS — M15.9 GENERALIZED OSTEOARTHRITIS: ICD-10-CM

## 2023-02-13 DIAGNOSIS — I10 ESSENTIAL HYPERTENSION: Primary | ICD-10-CM

## 2023-02-13 DIAGNOSIS — E11.21 TYPE 2 DIABETES WITH NEPHROPATHY (HCC): ICD-10-CM

## 2023-02-13 DIAGNOSIS — N18.32 STAGE 3B CHRONIC KIDNEY DISEASE (HCC): ICD-10-CM

## 2023-02-13 DIAGNOSIS — Z51.81 ENCOUNTER FOR MEDICATION MONITORING: ICD-10-CM

## 2023-02-13 DIAGNOSIS — G47.33 OSA (OBSTRUCTIVE SLEEP APNEA): ICD-10-CM

## 2023-02-13 DIAGNOSIS — K86.89 PANCREATIC MASS: ICD-10-CM

## 2023-02-13 DIAGNOSIS — C90.02 MULTIPLE MYELOMA IN RELAPSE (HCC): ICD-10-CM

## 2023-02-13 LAB
GLUCOSE POC: 194 MG/DL
HBA1C MFR BLD HPLC: 6.2 %

## 2023-02-13 PROCEDURE — G8536 NO DOC ELDER MAL SCRN: HCPCS | Performed by: FAMILY MEDICINE

## 2023-02-13 PROCEDURE — G8427 DOCREV CUR MEDS BY ELIG CLIN: HCPCS | Performed by: FAMILY MEDICINE

## 2023-02-13 PROCEDURE — G8510 SCR DEP NEG, NO PLAN REQD: HCPCS | Performed by: FAMILY MEDICINE

## 2023-02-13 PROCEDURE — 1090F PRES/ABSN URINE INCON ASSESS: CPT | Performed by: FAMILY MEDICINE

## 2023-02-13 PROCEDURE — 83036 HEMOGLOBIN GLYCOSYLATED A1C: CPT | Performed by: FAMILY MEDICINE

## 2023-02-13 PROCEDURE — G8417 CALC BMI ABV UP PARAM F/U: HCPCS | Performed by: FAMILY MEDICINE

## 2023-02-13 PROCEDURE — 99214 OFFICE O/P EST MOD 30 MIN: CPT | Performed by: FAMILY MEDICINE

## 2023-02-13 PROCEDURE — 1101F PT FALLS ASSESS-DOCD LE1/YR: CPT | Performed by: FAMILY MEDICINE

## 2023-02-13 PROCEDURE — 3044F HG A1C LEVEL LT 7.0%: CPT | Performed by: FAMILY MEDICINE

## 2023-02-13 PROCEDURE — G0463 HOSPITAL OUTPT CLINIC VISIT: HCPCS | Performed by: FAMILY MEDICINE

## 2023-02-13 PROCEDURE — 1123F ACP DISCUSS/DSCN MKR DOCD: CPT | Performed by: FAMILY MEDICINE

## 2023-02-13 PROCEDURE — 82947 ASSAY GLUCOSE BLOOD QUANT: CPT | Performed by: FAMILY MEDICINE

## 2023-02-13 RX ORDER — CHOLECALCIFEROL (VITAMIN D3) 25 MCG
2500 TABLET,CHEWABLE ORAL DAILY
COMMUNITY
Start: 2023-02-13

## 2023-02-13 RX ORDER — ASCORBIC ACID 500 MG
500 TABLET ORAL DAILY
COMMUNITY
Start: 2023-02-13

## 2023-02-13 RX ORDER — CHLORTHALIDONE 25 MG/1
50 TABLET ORAL DAILY
COMMUNITY
Start: 2023-02-13

## 2023-02-13 RX ORDER — PANTOPRAZOLE SODIUM 40 MG/1
40 TABLET, DELAYED RELEASE ORAL DAILY
COMMUNITY
Start: 2023-02-13

## 2023-02-13 RX ORDER — OMEPRAZOLE 20 MG/1
CAPSULE, DELAYED RELEASE ORAL
COMMUNITY
Start: 2023-01-21 | End: 2023-02-13 | Stop reason: ALTCHOICE

## 2023-02-13 NOTE — PROGRESS NOTES
HISTORY OF PRESENT ILLNESS  Deepika Decker is a 80 y.o. female. HPI  Follow up on chronic medical problems. Overall feeling well. Cardiovascular Review:  The patient has hypertension, ELEAZAR (but has not been using the CPAP) and hyperlipidemia. Denies chest pain or chest tightness. SOB is at her usual.  No cough or congestion noted. Diet and Lifestyle: generally follows a low fat low cholesterol diet, generally follows a low sodium diet, follows a diabetic diet regularly  Home BP Monitoring: is well controlled at home, ranging 130s's/70-80's. Pertinent ROS: taking medications as instructed, no medication side effects noted, no TIA's, no chest pain on exertion, no swelling of ankles. Diabetes Mellitus:  She has diabetes mellitus. Was taken off of metformin by one of her specialist doctors but pt is not sure who told her to stop it. She is seeing some BS in the 200 to 300s in the afternoons. Diabetic ROS - diabetic diet compliance: compliant most of the time, home glucose monitoring: is performed regularly, fasting values range low 100s,  Pt does not have BS log with her today, further diabetic ROS: no polyuria or polydipsia, no chest pain, dyspnea or TIA's, no numbness, tingling or pain in extremities, no unusual visual symptoms, no hypoglycemia. Her appetite is good. Lab review: orders written for new lab studies as appropriate; see orders. Chronic anemia and multiple myeloma in remission is being followed by hematology. 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 She is getting abd MRI w/ MRCP later in this month. Osteoarthritis:  Patient has osteoarthritis. She also had known spinal stenosis. She has been having pains in several joints that is migratory and comes and goes. Overall the pain in the joints and lower back has been doing good with minimal pain. Symptoms onset: problem is longstanding.   Rheumatological ROS: stable, mild-to-moderate joint symptoms intermittently, reasonably well controlled by PRN meds. Response to treatment plan: stable and intermittent. HM:  Mammogram 12/30/21. No further testing needed. Colonoscopy 7/15/2019 by  Romero Gabriel and was told she did not need another unless she had a problem. Patient Active Problem List   Diagnosis Code    Constipation K59.00    Gout M10.9    Spinal stenosis M48.00    Chronic anemia D64.9    Benign neoplasm of adrenal gland D35.00    Essential hypertension, malignant I10    Mixed hyperlipidemia E78.2    ELEAZAR on CPAP G47.33, Z99.89    Encounter for medication monitoring Z51.81    Type 2 diabetes mellitus without complication (HCC) M98.7    Type 2 diabetes with nephropathy (HCC) E11.21    WALKER (dyspnea on exertion) R06.09       Current Outpatient Medications   Medication Sig Dispense Refill    rosuvastatin (CRESTOR) 5 mg tablet TAKE 1 TABLET BY MOUTH AT  NIGHT 90 Tablet 3    metFORMIN ER (GLUCOPHAGE XR) 500 mg tablet TAKE 1 TABLET BY MOUTH  DAILY WITH DINNER 90 Tablet 3    metoprolol tartrate (LOPRESSOR) 100 mg IR tablet TAKE 1 TABLET BY MOUTH  TWICE DAILY 180 Tablet 3    amLODIPine (NORVASC) 10 mg tablet TAKE 1 TABLET BY MOUTH ONCE DAILY 90 Tablet 3    spironolactone (ALDACTONE) 25 mg tablet TAKE 1 TABLET BY MOUTH  TWICE DAILY 180 Tablet 3    polyethylene glycol (MIRALAX) 17 gram packet Take 1 Packet by mouth daily. cloNIDine HCL (CATAPRES) 0.3 mg tablet Take 1 Tablet by mouth nightly. Take 0.3 mg by mouth nightly. 90 Tablet 3    chlorthalidone (HYGROTON) 25 mg tablet TAKE 1 TABLET BY MOUTH  TWICE DAILY 180 Tablet 3    allopurinoL (ZYLOPRIM) 100 mg tablet TAKE 1 TABLET BY MOUTH  DAILY 90 Tablet 3    minoxidiL (LONITEN) 10 mg tablet TAKE ONE-HALF TABLET BY  MOUTH DAILY 45 Tablet 3    mupirocin calcium (BACTROBAN) 2 % topical cream Apply  to affected area two (2) times daily as needed (skin irritation). 30 g 1    meclizine (ANTIVERT) 12.5 mg tablet Take 1 Tab by mouth three (3) times daily as needed.  Take as needed for dizziness. 30 Tab 0    ondansetron (ZOFRAN ODT) 4 mg disintegrating tablet Take 4 mg by mouth every eight (8) hours as needed for Nausea. fluticasone propionate (FLONASE) 50 mcg/actuation nasal spray 2 Sprays by Both Nostrils route daily as needed. cyanocobalamin (VITAMIN B12) 100 mcg tablet Take 100 mcg by mouth daily. EPOETIN BRYAN (PROCRIT IJ) 1,000 Units by Injection route as needed. Depending on blood count      omega-3 fatty acids-vitamin e 1,000 mg cap Take 1 Tab by mouth daily. cholecalciferol (VITAMIN D3) (1000 Units /25 mcg) tablet Take 1,000 Units by mouth daily. Allergies   Allergen Reactions    Pcn [Penicillins] Swelling     tongue    Bactrim [Sulfamethoxazole-Trimethoprim] Diarrhea    Ceftin [Cefuroxime Axetil] Diarrhea    Hydralazine Other (comments)     edema    Penicillin Unknown (comments)    Sulfa (Sulfonamide Antibiotics) Itching         Past Medical History:   Diagnosis Date    Anemia NEC     Benign neoplasm of adrenal gland     Chronic anemia 6/1/2010    Constipation     on a daily stool softener which pt states helps as of 4/27/16    Diabetes ([address]     WALKER (dyspnea on exertion)     as of 4/27/16 pt states this is her baseline and is not getting any worse    GERD (gastroesophageal reflux disease)     Goiter 2011    as of 4/27/16 Pt states \"I am not even sure I have it\".   Pt denies any problems    Gout 6/1/2010    as of 4/27/16 pt denies any sx    HTN (hypertension) 6/1/2010    followed by cardiology Dr Master Willett  [phone]    Multiple myeloma, without mention of having achieved remission Dx 11/1/99    followed by Demetrio Chowdhury    Pure hypercholesterolemia 6/1/2010    Sleep apnea 6/1/2010    no CPAP    Spinal stenosis 6/1/2010         Past Surgical History:   Procedure Laterality Date    COLONOSCOPY N/A 7/15/2019    flex sig , polypectomy performed by Kelsey Owens [facility] at [facility]    HX ORTHOPAEDIC  2005    left hand-trigger finger    HX ORTHOPAEDIC  2007    right hand- trigger finger    [place] COLONOSCOPY FLX DX W/COLLJ SPEC WHEN PFRMD  08/10/2010             Family History   Problem Relation Age of Onset    Hypertension Mother     No Known Problems Father     Cancer Sister 52        colon     Diabetes Sister     Kidney Disease Sister     Hypertension Sister     Elevated Lipids Sister        Social History     Tobacco Use    Smoking status: Former     Types: Cigarettes     Quit date: 1980     Years since quittin.1    Smokeless tobacco: Never   Substance Use Topics    Alcohol use: No     Alcohol/week: 0.0 standard drinks        Lab Results   Component Value Date/Time    WBC 4.4 2022 10:08 AM    HGB 9.5 (L) 2022 10:08 AM    HCT 29.5 (L) 2022 10:08 AM    PLATELET 141 (L)  10:08 AM    MCV 91.6 2022 10:08 AM     Lab Results   Component Value Date/Time    Cholesterol, total 101 2022 10:26 AM    HDL Cholesterol 37 2022 10:26 AM    LDL, calculated 46.2 2022 10:26 AM    LDL-C, External 57 10/14/2015 12:00 AM    Triglyceride 89 2022 10:26 AM    CHOL/HDL Ratio 2.7 2022 10:26 AM     Lab Results   Component Value Date/Time    TSH 2.860 2011 09:29 AM      Lab Results   Component Value Date/Time    Sodium 139 10/12/2022 10:10 AM    Potassium 4.0 10/12/2022 10:10 AM    Chloride 109 (H) 10/12/2022 10:10 AM    CO2 26 10/12/2022 10:10 AM    Anion gap 4 (L) 10/12/2022 10:10 AM    Glucose 157 (H) 10/12/2022 10:10 AM    BUN 29 (H) 10/12/2022 10:10 AM    Creatinine 1.66 (H) 10/12/2022 10:10 AM    BUN/Creatinine ratio 17 10/12/2022 10:10 AM    GFR est AA 30 (L) 2022 10:26 AM    GFR est non-AA 25 (L) 2022 10:26 AM    Calcium 9.1 10/12/2022 10:10 AM    Bilirubin, total 0.4 2022 10:26 AM    ALT (SGPT) 7 (L) 2022 10:26 AM    Alk.  phosphatase 60 2022 10:26 AM    Protein, total 8.8 (H) 2022 10:26 AM    Albumin 3.4 (L) 2022 10:26 AM    Globulin 5.4 (H) 07/13/2022 10:26 AM    A-G Ratio 0.6 (L) 07/13/2022 10:26 AM      Lab Results   Component Value Date/Time    Hemoglobin A1c 6.2 (H) 10/27/2020 12:27 PM    Hemoglobin A1c (POC) 5.8 07/13/2022 09:53 AM    Hemoglobin A1c, External 6.4 10/14/2015 12:00 AM         Review of Systems   Constitutional:  Negative for malaise/fatigue. HENT:  Negative for congestion. Eyes:  Negative for blurred vision. Respiratory:  Negative for cough and shortness of breath. Cardiovascular:  Negative for chest pain, palpitations and leg swelling. Gastrointestinal:  Negative for abdominal pain, constipation and heartburn. Genitourinary:  Negative for dysuria, frequency and urgency. Neurological:  Negative for dizziness, tingling and headaches. Endo/Heme/Allergies:  Negative for environmental allergies. Psychiatric/Behavioral:  Negative for depression. The patient does not have insomnia. Physical Exam  Vitals and nursing note reviewed. Constitutional:       Appearance: Normal appearance. She is well-developed. Comments: BP (!) 156/61 (BP 1 Location: Right arm, BP Patient Position: Sitting)   Pulse (!) 50   Temp 97.7 °F (36.5 °C) (Oral)   Resp 16   Ht 5' 1\" (1.549 m)   Wt 141 lb 6.4 oz (64.1 kg)   SpO2 99%   BMI 26.72 kg/m²      HENT:      Right Ear: Tympanic membrane and ear canal normal.      Left Ear: Tympanic membrane and ear canal normal.   Neck:      Thyroid: No thyromegaly. Cardiovascular:      Rate and Rhythm: Normal rate and regular rhythm. Heart sounds: Normal heart sounds. Pulmonary:      Effort: Pulmonary effort is normal.      Breath sounds: Normal breath sounds. Abdominal:      General: Bowel sounds are normal.      Palpations: Abdomen is soft. There is no mass. Tenderness: There is no abdominal tenderness. Musculoskeletal:         General: Normal range of motion. Cervical back: Normal range of motion and neck supple.       Right lower leg: No edema. Left lower leg: No edema. Lymphadenopathy:      Cervical: No cervical adenopathy. Skin:     General: Skin is warm and dry. Neurological:      General: No focal deficit present. Mental Status: She is alert and oriented to person, place, and time. Psychiatric:         Mood and Affect: Mood normal.       ASSESSMENT and PLAN  Diagnoses and all orders for this visit:    1. Essential hypertension  Discussed sodium restriction, high k rich diet, maintaining ideal body weight and regular exercise program such as daily walking 30 min perday 4-5 times per week, as physiologic means to achieve blood pressure control. Medication compliance advised. 2. Type 2 diabetes with nephropathy (HCC)  A1c 6.2%. BS is 247.    -     AMB POC HEMOGLOBIN A1C  -     AMB POC GLUCOSE, QUANTITATIVE, BLOOD  -     start SITagliptin phosphate (JANUVIA) 25 mg tablet; Take 1 Tablet by mouth daily. Indications: type 2 diabetes mellitus    3. Mixed hyperlipidemia  -     LIPID PANEL; Future    4. Stage 3b chronic kidney disease ([facility] [address]  As per renal     5. Chronic anemia  Monitored by hematology    6. Gastroesophageal reflux disease, unspecified whether esophagitis present  Stable on protonix    7. Generalized osteoarthritis  Stable     8. ELEAZAR (obstructive sleep apnea)  Will monitor for now. 9. Multiple myeloma in relapse ([facility] [address]  10. Pancreatic mass  As per specialist    11. Encounter for medication monitoring  -     METABOLIC PANEL, COMPREHENSIVE; Future      Follow-up and Dispositions    Return in about 1 month (around 3/13/2023). reviewed diet, exercise and weight control  cardiovascular risk and specific lipid/LDL goals reviewed  reviewed medications and side effects in detail  specific diabetic recommendations: low cholesterol diet, weight control and daily exercise discussed and glycohemoglobin and other lab monitoring discussed    I have discussed diagnosis listed in this note with pt and/or family.  I have discussed treatment plans and options and the risk/benefit analysis of those options, including safe use of medications and possible medication side effects. Through the use of shared decision making we have agreed to the above plan. The patient has received an after-visit summary and questions were answered concerning future plans and follow up. Advise pt of any urgent changes then to proceed to the ER.

## 2023-02-13 NOTE — PROGRESS NOTES
Chief Complaint   Patient presents with    Hypertension    Diabetes     4 month f/u       1. \"Have you been to the ER, urgent care clinic since your last visit? Hospitalized since your last visit? \" No    2. \"Have you seen or consulted any other health care providers outside of the [address] [place] [address] since your last visit? \" No     3. For patients aged 39-70: Has the patient had a colonoscopy / FIT/ Cologuard? Yes - no Care Gap present      If the patient is female:    4. For patients aged 41-77: Has the patient had a mammogram within the past 2 years? Yes - no Care Gap present      5. For patients aged 21-65: Has the patient had a pap smear?  Yes - no Care Gap present    Visit Vitals  BP (!) 156/61 (BP 1 Location: Right arm, BP Patient Position: Sitting)   Pulse (!) 50   Temp 97.7 °F (36.5 °C) (Oral)   Resp 16   Ht 5' 1\" (1.549 m)   Wt 141 lb 6.4 oz (64.1 kg)   SpO2 99%   BMI 26.72 kg/m²

## 2023-02-14 LAB
ALBUMIN SERPL-MCNC: 3.5 G/DL (ref 3.5–5)
ALBUMIN/GLOB SERPL: 0.7 (ref 1.1–2.2)
ALP SERPL-CCNC: 58 U/L (ref 45–117)
ALT SERPL-CCNC: 24 U/L (ref 12–78)
ANION GAP SERPL CALC-SCNC: 5 MMOL/L (ref 5–15)
AST SERPL-CCNC: 11 U/L (ref 15–37)
BILIRUB SERPL-MCNC: 0.4 MG/DL (ref 0.2–1)
BUN SERPL-MCNC: 32 MG/DL (ref 6–20)
BUN/CREAT SERPL: 19 (ref 12–20)
CALCIUM SERPL-MCNC: 9.4 MG/DL (ref 8.5–10.1)
CHLORIDE SERPL-SCNC: 108 MMOL/L (ref 97–108)
CHOLEST SERPL-MCNC: 123 MG/DL
CO2 SERPL-SCNC: 27 MMOL/L (ref 21–32)
CREAT SERPL-MCNC: 1.72 MG/DL (ref 0.55–1.02)
GLOBULIN SER CALC-MCNC: 5.1 G/DL (ref 2–4)
GLUCOSE SERPL-MCNC: 113 MG/DL (ref 65–100)
HDLC SERPL-MCNC: 44 MG/DL
HDLC SERPL: 2.8 (ref 0–5)
LDLC SERPL CALC-MCNC: 62.8 MG/DL (ref 0–100)
POTASSIUM SERPL-SCNC: 3.9 MMOL/L (ref 3.5–5.1)
PROT SERPL-MCNC: 8.6 G/DL (ref 6.4–8.2)
SODIUM SERPL-SCNC: 140 MMOL/L (ref 136–145)
TRIGL SERPL-MCNC: 81 MG/DL (ref ?–150)
VLDLC SERPL CALC-MCNC: 16.2 MG/DL

## 2023-02-24 ENCOUNTER — HOSPITAL ENCOUNTER (OUTPATIENT)
Dept: MRI IMAGING | Age: 88
End: 2023-02-24
Attending: INTERNAL MEDICINE
Payer: MEDICARE

## 2023-02-24 ENCOUNTER — HOSPITAL ENCOUNTER (OUTPATIENT)
Dept: CT IMAGING | Age: 88
Discharge: HOME | End: 2023-02-24
Attending: INTERNAL MEDICINE
Payer: MEDICARE

## 2023-02-24 VITALS — WEIGHT: 140 LBS | BODY MASS INDEX: 26.45 KG/M2

## 2023-02-24 DIAGNOSIS — E87.5 HYPERKALEMIA: ICD-10-CM

## 2023-02-24 DIAGNOSIS — C90.00 MULTIPLE MYELOMA NOT HAVING ACHIEVED REMISSION (HCC): ICD-10-CM

## 2023-02-24 DIAGNOSIS — D50.9 IRON DEFICIENCY ANEMIA, UNSPECIFIED: ICD-10-CM

## 2023-02-24 PROCEDURE — A9576 INJ PROHANCE MULTIPACK: HCPCS | Performed by: INTERNAL MEDICINE

## 2023-02-24 PROCEDURE — 71250 CT THORAX DX C-: CPT

## 2023-02-24 PROCEDURE — 74183 MRI ABD W/O CNTR FLWD CNTR: CPT

## 2023-02-24 PROCEDURE — 74011250636 HC RX REV CODE- 250/636: Performed by: INTERNAL MEDICINE

## 2023-02-24 RX ADMIN — GADOTERIDOL 13 ML: 279.3 INJECTION, SOLUTION INTRAVENOUS at 11:54

## 2023-03-08 ENCOUNTER — DOCUMENTATION ONLY (OUTPATIENT)
Dept: FAMILY MEDICINE CLINIC | Age: 88
End: 2023-03-08

## 2023-03-08 NOTE — PROGRESS NOTES
Request for notes & labs [facility] for upcoming appt with Dr Nathalia Momin on 03/15/23 faxed t [phone] via Epic

## 2023-03-17 ENCOUNTER — OFFICE VISIT (OUTPATIENT)
Dept: FAMILY MEDICINE CLINIC | Age: 88
End: 2023-03-17

## 2023-03-17 VITALS
HEART RATE: 62 BPM | BODY MASS INDEX: 26.21 KG/M2 | WEIGHT: 138.8 LBS | TEMPERATURE: 98 F | RESPIRATION RATE: 12 BRPM | OXYGEN SATURATION: 98 % | DIASTOLIC BLOOD PRESSURE: 63 MMHG | HEIGHT: 61 IN | SYSTOLIC BLOOD PRESSURE: 138 MMHG

## 2023-03-17 DIAGNOSIS — I10 ESSENTIAL HYPERTENSION: Primary | ICD-10-CM

## 2023-03-17 DIAGNOSIS — Z51.81 ENCOUNTER FOR MEDICATION MONITORING: ICD-10-CM

## 2023-03-17 DIAGNOSIS — K86.89 DILATION OF PANCREATIC DUCT: ICD-10-CM

## 2023-03-17 DIAGNOSIS — K86.2 PANCREATIC CYST: ICD-10-CM

## 2023-03-17 DIAGNOSIS — E11.21 TYPE 2 DIABETES WITH NEPHROPATHY (HCC): ICD-10-CM

## 2023-03-17 LAB — GLUCOSE POC: 149 MG/DL

## 2023-03-17 RX ORDER — OMEPRAZOLE 20 MG/1
20 CAPSULE, DELAYED RELEASE ORAL 2 TIMES DAILY
Qty: 180 CAPSULE | Refills: 3 | Status: SHIPPED | COMMUNITY
Start: 2023-03-17

## 2023-03-17 RX ORDER — OMEPRAZOLE 20 MG/1
20 CAPSULE, DELAYED RELEASE ORAL 2 TIMES DAILY
COMMUNITY
End: 2023-03-17 | Stop reason: SDUPTHER

## 2023-03-17 RX ORDER — CHLORTHALIDONE 50 MG/1
50 TABLET ORAL DAILY
COMMUNITY
End: 2023-03-17 | Stop reason: SDUPTHER

## 2023-03-17 RX ORDER — CHLORTHALIDONE 50 MG/1
50 TABLET ORAL DAILY
Qty: 90 TABLET | Refills: 3 | Status: SHIPPED | COMMUNITY
Start: 2023-03-17

## 2023-03-17 NOTE — PROGRESS NOTES
Patient identified by 2 identifiers. Chief Complaint   Patient presents with    Follow-up    Diabetes     1. Have you been to the ER, urgent care clinic since your last visit? Hospitalized since your last visit? No    2. Have you seen or consulted any other health care providers outside of the [address] [place] [address] since your last visit? Include any pap smears or colon screening.  No

## 2023-03-18 LAB
ANION GAP SERPL CALC-SCNC: 5 MMOL/L (ref 5–15)
BUN SERPL-MCNC: 40 MG/DL (ref 6–20)
BUN/CREAT SERPL: 19 (ref 12–20)
CALCIUM SERPL-MCNC: 9.5 MG/DL (ref 8.5–10.1)
CHLORIDE SERPL-SCNC: 108 MMOL/L (ref 97–108)
CO2 SERPL-SCNC: 26 MMOL/L (ref 21–32)
CREAT SERPL-MCNC: 2.15 MG/DL (ref 0.55–1.02)
GLUCOSE SERPL-MCNC: 119 MG/DL (ref 65–100)
POTASSIUM SERPL-SCNC: 4.8 MMOL/L (ref 3.5–5.1)
SODIUM SERPL-SCNC: 139 MMOL/L (ref 136–145)

## 2023-04-05 ENCOUNTER — TRANSCRIBE ORDER (OUTPATIENT)
Dept: SCHEDULING | Age: 88
End: 2023-04-05

## 2023-04-19 RX ORDER — CLONIDINE HYDROCHLORIDE 0.3 MG/1
TABLET ORAL
Qty: 90 TABLET | Refills: 3 | Status: SHIPPED | OUTPATIENT
Start: 2023-04-19

## 2023-04-19 RX ORDER — ALLOPURINOL 100 MG/1
TABLET ORAL
Qty: 90 TABLET | Refills: 3 | Status: SHIPPED | OUTPATIENT
Start: 2023-04-19

## 2023-04-22 ENCOUNTER — TRANSCRIBE ORDERS (OUTPATIENT)
Facility: HOSPITAL | Age: 88
End: 2023-04-22

## 2023-04-22 DIAGNOSIS — C90.00 OSTEOSCLEROTIC MYELOMA (HCC): Primary | ICD-10-CM

## 2023-04-23 DIAGNOSIS — C90.00 OSTEOSCLEROTIC MYELOMA (HCC): Primary | ICD-10-CM

## 2023-05-26 ENCOUNTER — OFFICE VISIT (OUTPATIENT)
Age: 88
End: 2023-05-26
Payer: MEDICARE

## 2023-05-26 VITALS
HEART RATE: 50 BPM | TEMPERATURE: 98 F | SYSTOLIC BLOOD PRESSURE: 140 MMHG | DIASTOLIC BLOOD PRESSURE: 46 MMHG | BODY MASS INDEX: 26.64 KG/M2 | RESPIRATION RATE: 16 BRPM | WEIGHT: 141 LBS | OXYGEN SATURATION: 97 %

## 2023-05-26 DIAGNOSIS — G47.33 OBSTRUCTIVE SLEEP APNEA (ADULT) (PEDIATRIC): ICD-10-CM

## 2023-05-26 DIAGNOSIS — C90.02 MULTIPLE MYELOMA IN RELAPSE (HCC): ICD-10-CM

## 2023-05-26 DIAGNOSIS — D64.9 CHRONIC ANEMIA: ICD-10-CM

## 2023-05-26 DIAGNOSIS — M15.9 PRIMARY OSTEOARTHRITIS INVOLVING MULTIPLE JOINTS: ICD-10-CM

## 2023-05-26 DIAGNOSIS — R00.1 SINUS BRADYCARDIA: ICD-10-CM

## 2023-05-26 DIAGNOSIS — N18.32 CHRONIC KIDNEY DISEASE, STAGE 3B (HCC): ICD-10-CM

## 2023-05-26 DIAGNOSIS — Z79.899 ENCOUNTER FOR LONG-TERM (CURRENT) USE OF MEDICATIONS: ICD-10-CM

## 2023-05-26 DIAGNOSIS — I10 ESSENTIAL (PRIMARY) HYPERTENSION: Primary | ICD-10-CM

## 2023-05-26 DIAGNOSIS — K21.9 GASTRO-ESOPHAGEAL REFLUX DISEASE WITHOUT ESOPHAGITIS: ICD-10-CM

## 2023-05-26 DIAGNOSIS — K86.2 CYST OF PANCREAS: ICD-10-CM

## 2023-05-26 DIAGNOSIS — E78.2 MIXED HYPERLIPIDEMIA: ICD-10-CM

## 2023-05-26 DIAGNOSIS — E11.21 TYPE 2 DIABETES MELLITUS WITH DIABETIC NEPHROPATHY, WITHOUT LONG-TERM CURRENT USE OF INSULIN (HCC): ICD-10-CM

## 2023-05-26 LAB
ANION GAP SERPL CALC-SCNC: 4 MMOL/L (ref 5–15)
BUN SERPL-MCNC: 29 MG/DL (ref 6–20)
BUN/CREAT SERPL: 18 (ref 12–20)
CALCIUM SERPL-MCNC: 9 MG/DL (ref 8.5–10.1)
CHLORIDE SERPL-SCNC: 108 MMOL/L (ref 97–108)
CO2 SERPL-SCNC: 27 MMOL/L (ref 21–32)
CREAT SERPL-MCNC: 1.64 MG/DL (ref 0.55–1.02)
ERYTHROCYTE [DISTWIDTH] IN BLOOD BY AUTOMATED COUNT: 16.4 % (ref 11.5–14.5)
GLUCOSE SERPL-MCNC: 127 MG/DL (ref 65–100)
GLUCOSE, POC: 183 MG/DL
HBA1C MFR BLD: 5.9 %
HCT VFR BLD AUTO: 32.1 % (ref 35–47)
HGB BLD-MCNC: 10.1 G/DL (ref 11.5–16)
MCH RBC QN AUTO: 29.4 PG (ref 26–34)
MCHC RBC AUTO-ENTMCNC: 31.5 G/DL (ref 30–36.5)
MCV RBC AUTO: 93.6 FL (ref 80–99)
NRBC # BLD: 0 K/UL (ref 0–0.01)
NRBC BLD-RTO: 0 PER 100 WBC
PLATELET # BLD AUTO: 134 K/UL (ref 150–400)
PMV BLD AUTO: 11.2 FL (ref 8.9–12.9)
POTASSIUM SERPL-SCNC: 3.8 MMOL/L (ref 3.5–5.1)
RBC # BLD AUTO: 3.43 M/UL (ref 3.8–5.2)
SODIUM SERPL-SCNC: 139 MMOL/L (ref 136–145)
WBC # BLD AUTO: 4.1 K/UL (ref 3.6–11)

## 2023-05-26 PROCEDURE — PBSHW AMB POC GLUCOSE BLOOD, BY GLUCOSE MONITORING DEVICE: Performed by: FAMILY MEDICINE

## 2023-05-26 PROCEDURE — G8427 DOCREV CUR MEDS BY ELIG CLIN: HCPCS | Performed by: FAMILY MEDICINE

## 2023-05-26 PROCEDURE — 1090F PRES/ABSN URINE INCON ASSESS: CPT | Performed by: FAMILY MEDICINE

## 2023-05-26 PROCEDURE — 1123F ACP DISCUSS/DSCN MKR DOCD: CPT | Performed by: FAMILY MEDICINE

## 2023-05-26 PROCEDURE — PBSHW AMB POC HEMOGLOBIN A1C: Performed by: FAMILY MEDICINE

## 2023-05-26 PROCEDURE — 99214 OFFICE O/P EST MOD 30 MIN: CPT | Performed by: FAMILY MEDICINE

## 2023-05-26 PROCEDURE — 82962 GLUCOSE BLOOD TEST: CPT | Performed by: FAMILY MEDICINE

## 2023-05-26 PROCEDURE — G8419 CALC BMI OUT NRM PARAM NOF/U: HCPCS | Performed by: FAMILY MEDICINE

## 2023-05-26 PROCEDURE — 1036F TOBACCO NON-USER: CPT | Performed by: FAMILY MEDICINE

## 2023-05-26 PROCEDURE — 83036 HEMOGLOBIN GLYCOSYLATED A1C: CPT | Performed by: FAMILY MEDICINE

## 2023-05-26 RX ORDER — CHLORTHALIDONE 50 MG/1
TABLET ORAL
COMMUNITY
Start: 2023-03-17

## 2023-05-26 RX ORDER — METOPROLOL TARTRATE 100 MG/1
50 TABLET ORAL 2 TIMES DAILY
Qty: 60 TABLET | Refills: 0
Start: 2023-05-26

## 2023-05-26 SDOH — ECONOMIC STABILITY: HOUSING INSECURITY
IN THE LAST 12 MONTHS, WAS THERE A TIME WHEN YOU DID NOT HAVE A STEADY PLACE TO SLEEP OR SLEPT IN A SHELTER (INCLUDING NOW)?: NO

## 2023-05-26 SDOH — ECONOMIC STABILITY: FOOD INSECURITY: WITHIN THE PAST 12 MONTHS, THE FOOD YOU BOUGHT JUST DIDN'T LAST AND YOU DIDN'T HAVE MONEY TO GET MORE.: NEVER TRUE

## 2023-05-26 SDOH — ECONOMIC STABILITY: INCOME INSECURITY: HOW HARD IS IT FOR YOU TO PAY FOR THE VERY BASICS LIKE FOOD, HOUSING, MEDICAL CARE, AND HEATING?: NOT HARD AT ALL

## 2023-05-26 SDOH — ECONOMIC STABILITY: FOOD INSECURITY: WITHIN THE PAST 12 MONTHS, YOU WORRIED THAT YOUR FOOD WOULD RUN OUT BEFORE YOU GOT MONEY TO BUY MORE.: NEVER TRUE

## 2023-05-26 ASSESSMENT — ANXIETY QUESTIONNAIRES
2. NOT BEING ABLE TO STOP OR CONTROL WORRYING: 0
GAD7 TOTAL SCORE: 0
7. FEELING AFRAID AS IF SOMETHING AWFUL MIGHT HAPPEN: 0
6. BECOMING EASILY ANNOYED OR IRRITABLE: 0
3. WORRYING TOO MUCH ABOUT DIFFERENT THINGS: 0
1. FEELING NERVOUS, ANXIOUS, OR ON EDGE: 0
IF YOU CHECKED OFF ANY PROBLEMS ON THIS QUESTIONNAIRE, HOW DIFFICULT HAVE THESE PROBLEMS MADE IT FOR YOU TO DO YOUR WORK, TAKE CARE OF THINGS AT HOME, OR GET ALONG WITH OTHER PEOPLE: NOT DIFFICULT AT ALL
4. TROUBLE RELAXING: 0
5. BEING SO RESTLESS THAT IT IS HARD TO SIT STILL: 0

## 2023-05-26 ASSESSMENT — ENCOUNTER SYMPTOMS
ABDOMINAL PAIN: 0
RHINORRHEA: 0
BLOOD IN STOOL: 0
CONSTIPATION: 0
COUGH: 0
SHORTNESS OF BREATH: 0
CHEST TIGHTNESS: 0

## 2023-05-26 ASSESSMENT — PATIENT HEALTH QUESTIONNAIRE - PHQ9
SUM OF ALL RESPONSES TO PHQ QUESTIONS 1-9: 0
SUM OF ALL RESPONSES TO PHQ QUESTIONS 1-9: 0
SUM OF ALL RESPONSES TO PHQ9 QUESTIONS 1 & 2: 0
SUM OF ALL RESPONSES TO PHQ QUESTIONS 1-9: 0
SUM OF ALL RESPONSES TO PHQ QUESTIONS 1-9: 0
1. LITTLE INTEREST OR PLEASURE IN DOING THINGS: 0
2. FEELING DOWN, DEPRESSED OR HOPELESS: 0

## 2023-06-05 ENCOUNTER — HOSPITAL ENCOUNTER (OUTPATIENT)
Facility: HOSPITAL | Age: 88
Discharge: HOME | End: 2023-06-08
Payer: MEDICARE

## 2023-06-05 DIAGNOSIS — C90.00 OSTEOSCLEROTIC MYELOMA (HCC): ICD-10-CM

## 2023-06-05 PROCEDURE — 71250 CT THORAX DX C-: CPT

## 2023-06-09 ENCOUNTER — ANESTHESIA (OUTPATIENT)
Facility: HOSPITAL | Age: 88
End: 2023-06-09
Payer: MEDICARE

## 2023-06-09 ENCOUNTER — ANESTHESIA EVENT (OUTPATIENT)
Facility: HOSPITAL | Age: 88
End: 2023-06-09
Payer: MEDICARE

## 2023-06-09 ENCOUNTER — HOSPITAL ENCOUNTER (OUTPATIENT)
Facility: HOSPITAL | Age: 88
Setting detail: OUTPATIENT SURGERY
Discharge: HOME | End: 2023-06-09
Attending: INTERNAL MEDICINE | Admitting: INTERNAL MEDICINE
Payer: MEDICARE

## 2023-06-09 VITALS
BODY MASS INDEX: 27.62 KG/M2 | RESPIRATION RATE: 13 BRPM | WEIGHT: 140.7 LBS | OXYGEN SATURATION: 98 % | TEMPERATURE: 97.2 F | HEART RATE: 45 BPM | SYSTOLIC BLOOD PRESSURE: 149 MMHG | DIASTOLIC BLOOD PRESSURE: 40 MMHG | HEIGHT: 60 IN

## 2023-06-09 PROCEDURE — 2709999900 HC NON-CHARGEABLE SUPPLY: Performed by: INTERNAL MEDICINE

## 2023-06-09 PROCEDURE — 7100000010 HC PHASE II RECOVERY - FIRST 15 MIN: Performed by: INTERNAL MEDICINE

## 2023-06-09 PROCEDURE — 6360000002 HC RX W HCPCS: Performed by: NURSE ANESTHETIST, CERTIFIED REGISTERED

## 2023-06-09 PROCEDURE — 2580000003 HC RX 258: Performed by: INTERNAL MEDICINE

## 2023-06-09 PROCEDURE — 2720000010 HC SURG SUPPLY STERILE: Performed by: INTERNAL MEDICINE

## 2023-06-09 PROCEDURE — 3600007502: Performed by: INTERNAL MEDICINE

## 2023-06-09 PROCEDURE — 3600007512: Performed by: INTERNAL MEDICINE

## 2023-06-09 PROCEDURE — 3700000000 HC ANESTHESIA ATTENDED CARE: Performed by: INTERNAL MEDICINE

## 2023-06-09 PROCEDURE — 6360000002 HC RX W HCPCS: Performed by: INTERNAL MEDICINE

## 2023-06-09 PROCEDURE — 3700000001 HC ADD 15 MINUTES (ANESTHESIA): Performed by: INTERNAL MEDICINE

## 2023-06-09 PROCEDURE — 7100000011 HC PHASE II RECOVERY - ADDTL 15 MIN: Performed by: INTERNAL MEDICINE

## 2023-06-09 PROCEDURE — C1753 CATH, INTRAVAS ULTRASOUND: HCPCS | Performed by: INTERNAL MEDICINE

## 2023-06-09 PROCEDURE — 2500000003 HC RX 250 WO HCPCS: Performed by: NURSE ANESTHETIST, CERTIFIED REGISTERED

## 2023-06-09 RX ORDER — SODIUM CHLORIDE 9 MG/ML
25 INJECTION, SOLUTION INTRAVENOUS PRN
Status: DISCONTINUED | OUTPATIENT
Start: 2023-06-09 | End: 2023-06-09 | Stop reason: HOSPADM

## 2023-06-09 RX ORDER — SODIUM CHLORIDE 0.9 % (FLUSH) 0.9 %
5-40 SYRINGE (ML) INJECTION EVERY 12 HOURS SCHEDULED
Status: DISCONTINUED | OUTPATIENT
Start: 2023-06-09 | End: 2023-06-09 | Stop reason: HOSPADM

## 2023-06-09 RX ORDER — LEVOFLOXACIN 500 MG/1
500 TABLET, FILM COATED ORAL DAILY
Qty: 3 TABLET | Refills: 0 | Status: SHIPPED | OUTPATIENT
Start: 2023-06-09 | End: 2023-06-12

## 2023-06-09 RX ORDER — LEVOFLOXACIN 5 MG/ML
500 INJECTION, SOLUTION INTRAVENOUS ONCE
Status: COMPLETED | OUTPATIENT
Start: 2023-06-09 | End: 2023-06-09

## 2023-06-09 RX ORDER — LIDOCAINE HYDROCHLORIDE 20 MG/ML
INJECTION, SOLUTION EPIDURAL; INFILTRATION; INTRACAUDAL; PERINEURAL PRN
Status: DISCONTINUED | OUTPATIENT
Start: 2023-06-09 | End: 2023-06-09 | Stop reason: SDUPTHER

## 2023-06-09 RX ORDER — SODIUM CHLORIDE 0.9 % (FLUSH) 0.9 %
5-40 SYRINGE (ML) INJECTION PRN
Status: DISCONTINUED | OUTPATIENT
Start: 2023-06-09 | End: 2023-06-09 | Stop reason: HOSPADM

## 2023-06-09 RX ADMIN — LIDOCAINE HYDROCHLORIDE 100 MG: 20 INJECTION, SOLUTION EPIDURAL; INFILTRATION; INTRACAUDAL; PERINEURAL at 09:45

## 2023-06-09 RX ADMIN — PROPOFOL 20 MG: 10 INJECTION, EMULSION INTRAVENOUS at 10:14

## 2023-06-09 RX ADMIN — PROPOFOL 20 MG: 10 INJECTION, EMULSION INTRAVENOUS at 10:08

## 2023-06-09 RX ADMIN — PROPOFOL 20 MG: 10 INJECTION, EMULSION INTRAVENOUS at 09:57

## 2023-06-09 RX ADMIN — PROPOFOL 20 MG: 10 INJECTION, EMULSION INTRAVENOUS at 10:01

## 2023-06-09 RX ADMIN — PROPOFOL 20 MG: 10 INJECTION, EMULSION INTRAVENOUS at 10:17

## 2023-06-09 RX ADMIN — PROPOFOL 30 MG: 10 INJECTION, EMULSION INTRAVENOUS at 09:59

## 2023-06-09 RX ADMIN — SODIUM CHLORIDE 25 ML: 9 INJECTION, SOLUTION INTRAVENOUS at 09:11

## 2023-06-09 RX ADMIN — PROPOFOL 20 MG: 10 INJECTION, EMULSION INTRAVENOUS at 10:05

## 2023-06-09 RX ADMIN — PROPOFOL 10 MG: 10 INJECTION, EMULSION INTRAVENOUS at 09:49

## 2023-06-09 RX ADMIN — PROPOFOL 10 MG: 10 INJECTION, EMULSION INTRAVENOUS at 09:55

## 2023-06-09 RX ADMIN — PROPOFOL 50 MG: 10 INJECTION, EMULSION INTRAVENOUS at 09:47

## 2023-06-09 RX ADMIN — PROPOFOL 10 MG: 10 INJECTION, EMULSION INTRAVENOUS at 09:54

## 2023-06-09 RX ADMIN — PROPOFOL 20 MG: 10 INJECTION, EMULSION INTRAVENOUS at 09:52

## 2023-06-09 RX ADMIN — LEVOFLOXACIN 500 MG: 5 INJECTION, SOLUTION INTRAVENOUS at 09:10

## 2023-06-09 RX ADMIN — PROPOFOL 50 MG: 10 INJECTION, EMULSION INTRAVENOUS at 09:45

## 2023-06-09 ASSESSMENT — PAIN - FUNCTIONAL ASSESSMENT: PAIN_FUNCTIONAL_ASSESSMENT: 0-10

## 2023-06-09 ASSESSMENT — ENCOUNTER SYMPTOMS: SHORTNESS OF BREATH: 1

## 2023-06-09 NOTE — ANESTHESIA PRE PROCEDURE
Department of Anesthesiology  Preprocedure Note       Name:  Yeison Amaya   Age:  80 y.o.  :  1935                                          MRN:  [id number]         Date:  2023      Surgeon: Abbe Orozco):  Osmany Lopez MD    Procedure: Procedure(s):  ENDOSCOPIC ULTRASOUND, FINE NEEDLE ASPIRATION, CYST    Medications prior to admission:   Prior to Admission medications    Medication Sig Start Date End Date Taking?  Authorizing Provider   vitamin D (CHOLECALCIFEROL) 25 MCG (1000 UT) TABS tablet Take 1 tablet by mouth daily   Yes Historical Provider, MD   epoetin christina (EPOGEN;PROCRIT) 15332 UNIT/ML injection 0.1 mLs as needed    Historical Provider, MD   Omega-3 Fatty Acids (OMEGA-3 EPA FISH OIL PO) Take 1 tablet by mouth daily 6/2/10   Historical Provider, MD   metoprolol (LOPRESSOR) 100 MG tablet Take 0.5 tablets by mouth 2 times daily 23   Adan Mendez MD   allopurinol (ZYLOPRIM) 100 MG tablet TAKE 1 TABLET BY MOUTH  DAILY 22   Ar Automatic Reconciliation   amLODIPine (NORVASC) 10 MG tablet TAKE 1 TABLET BY MOUTH ONCE DAILY 22   Ar Automatic Reconciliation   ascorbic acid (VITAMIN C) 500 MG tablet Take by mouth daily 23   Ar Automatic Reconciliation   chlorthalidone (HYGROTEN) 50 MG tablet Take 1 tablet by mouth daily 23   Ar Automatic Reconciliation   cloNIDine (CATAPRES) 0.3 MG tablet Take by mouth 22   Ar Automatic Reconciliation   Cyanocobalamin 2500 MCG TABS Take by mouth daily 23   Ar Automatic Reconciliation   minoxidil (LONITEN) 10 MG tablet Take 0.5 tablets by mouth daily 22   Ar Automatic Reconciliation   mupirocin (BACTROBAN) 2 % cream Apply topically 2 times daily as needed 19   Ar Automatic Reconciliation   polyethylene glycol (GLYCOLAX) 17 GM/SCOOP powder Take by mouth daily 22   Ar Automatic Reconciliation   rosuvastatin (CRESTOR) 5 MG tablet TAKE 1 TABLET BY MOUTH AT  NIGHT 23   Ar Automatic Reconciliation   SITagliptin (Camilla Curly)
25 MG tablet Take by mouth daily 2/13/23   Ar Automatic Reconciliation   spironolactone (ALDACTONE) 25 MG tablet TAKE 1 TABLET BY MOUTH  TWICE DAILY 11/2/22   Ar Automatic Reconciliation       Current medications:    No current facility-administered medications for this encounter. Allergies:     Allergies   Allergen Reactions    Cefuroxime Axetil Diarrhea    Penicillins Itching and Swelling           Sulfa Antibiotics Itching and Swelling    Sulfamethoxazole-Trimethoprim Itching and Swelling       Problem List:    Patient Active Problem List   Diagnosis Code    Constipation K59.00    Benign neoplasm of adrenal gland D35.00    MEDINA (dyspnea on exertion) R06.09    Type 2 diabetes with nephropathy (HCC) E11.21    CARL on CPAP G47.33, Z99.89    Essential hypertension, malignant I10    Mixed hyperlipidemia E78.2    Chronic anemia D64.9    Spinal stenosis M48.00    Type 2 diabetes mellitus without complication (HCC) C59.9    Encounter for medication monitoring Z51.81    Gout M10.9       Past Medical History:        Diagnosis Date    Benign neoplasm of adrenal gland     Chronic anemia     Chronic constipation     CKD (chronic kidney disease)     stage III; Dr. Loree Hunter [facility])     MEDINA (dyspnea on exertion)     GERD (gastroesophageal reflux disease)     Gout     HTN (hypertension)     Multiple myeloma (HCC) 1999    Dr. Twanna Phalen    Pure hypercholesterolemia     Sleep apnea     no CPAP    Spinal stenosis        Past Surgical History:        Procedure Laterality Date    COLONOSCOPY N/A 7/15/2019    flex sig , polypectomy performed by Kelton Preciado MD at [facility] AM[facility] OR    COLONOSCOPY FLX DX W/COLLJ SPEC WHEN PFRMD  08/10/2010        CT BONE MARROW BIOPSY  12/28/2022    CT BONE MARROW BIOPSY 12/28/2022 [facility] RAD CT    HYSTERECTOMY (CERVIX STATUS UNKNOWN)  1970s    ORTHOPEDIC SURGERY  2005    left hand-trigger finger    ORTHOPEDIC SURGERY  2007    right hand-

## 2023-06-09 NOTE — ANESTHESIA POSTPROCEDURE EVALUATION
Department of Anesthesiology  Postprocedure Note    Patient:  Rios Coello  MRN: [id number]  YOB: 1935  Date of evaluation: 6/9/2023      Procedure Summary     Date: 06/09/23 Room / Location: [facility] ENDO 03 / [facility] ENDOSCOPY    Anesthesia Start: 5882 Anesthesia Stop: 0548    Procedures:       EGD W/EUS FNA      EGD ESOPHAGOGASTRODUODENOSCOPY (Upper GI Region) Diagnosis:       Cyst of pancreas      Abnormal CT scan, gastrointestinal tract      (Cyst of pancreas [K86.2])    Surgeons: Viji Lopez MD Responsible Provider: Luana Rodriguez MD    Anesthesia Type: General, TIVA ASA Status: 3          Anesthesia Type: General, TIVA    Maureen Phase I: Maureen Score: 10    Maureen Phase II: Maureen Score: 10      Anesthesia Post Evaluation    Patient location during evaluation: PACU  Patient participation: complete - patient participated  Level of consciousness: sleepy but conscious and responsive to verbal stimuli  Airway patency: patent  Nausea & Vomiting: no vomiting and no nausea  Complications: no  Cardiovascular status: blood pressure returned to baseline and hemodynamically stable  Respiratory status: acceptable  Hydration status: stable

## 2023-06-09 NOTE — OP NOTE
without increased vascularity     Pancreatic Duct:  slightly dilated to 4mm in the distal pancreatic body and tail   Liver:     Parenchyma: normal    Gallbladder: not examined    Bile Duct: the common bile duct is normal               Lymph Node: no adenopathy     Specimen Removed:   FNB x 3  Complications: None. EBL:  None. Interventions: Fine needle biopsy  was performed of the mural nodule within the pancreatic body cyst targeted from the stomach using a 22 gauge Shark Core needle with 3 of passes with preliminary results suggesting malignancy. Impressions:  -Normal esophagus  -Small 2cm hiatal hernia from 38-40cm  -Normal stomach mucosa  -Normal duodenal mucosa  -Large cystic lesion in pancreatic body seen from stomach at 45-49cm, measuring 5.6cm x 5.7cm with solid-appearing non-dependent mural nodule in its superior aspect measuring 2.7cm x 1.3cm that appears papillar without increased vascularity. Fine needle biopsy  was performed of the mural nodule within the pancreatic body cyst targeted from the stomach using a 22 gauge Shark Core needle with 3 of passes with preliminary results suggesting malignancy. -Dilated pancreatic duct in distal body and tail to 4mm    Recommendations: 1) Await final staining results.  2) Levaquin for 3 more days  Angel Cam MD

## 2023-06-09 NOTE — PERIOP NOTE
Endoscopy Case End Note:    1021:  Procedure scope was pre-cleaned, per protocol, at bedside by LT.      1031:  Report received from anesthesia Octaviano Smith CRNA. See anesthesia flowsheet for intra-procedure vital signs and events. 1031:  dentures and glasses returned to patient.

## 2023-06-09 NOTE — DISCHARGE INSTRUCTIONS
Instructions:   Call Dr. Jose Dick for the results of procedure / biopsy in 5 days   Telephone # [phone]  Await final staining results. Levaquin for 3 more days - prescription printed    Shelley Cotton MD      Patient Education on Sedation / Analgesia Administered for Procedure      For 24 hours after general anesthesia or intravenous analgesia / sedation:  Have someone responsible help you with your care  Limit your activities  Do not drive and operate hazardous machinery  Do not make important personal, legal or business decisions  Do not drink alcoholic beverages  If you have not urinated within 8 hours after discharge, please contact your physician  Resume your medications unless otherwise instructed    For 24 hours after general anesthesia or intravenous analgesia / sedation  you may experience:  Drowsiness, dizziness, sleepiness, or confusion  Difficulty remembering or delayed reaction times  Difficulty with your balance, especially while walking, move slowly and carefully, do not make sudden position changes  Difficulty focusing or blurred vision    You may not be aware of slight changes in your behavior and/or your reaction time because of the medication used during and after your procedure.     Report the following to your physician:  Excessive pain, swelling, redness or odor of or around the surgical area  Temperature over 100.5  Nausea and vomiting lasting longer than 4 hours or if unable to take medications  Any signs of decreased circulation or nerve impairment to extremity: change in color, persistent numbness, tingling, coldness or increase pain  Any questions or concerns    IF YOU REPORT TO AN EMERGENCY ROOM, DOCTOR'S OFFICE OR HOSPITAL WITHIN 24 HOURS AFTER YOUR PROCEDURE, BRING THIS SHEET AND YOUR AFTER VISIT SUMMARY WITH YOU AND GIVE IT TO THE PHYSICIAN OR NURSE ATTENDING YOU.

## 2023-06-09 NOTE — PERIOP NOTE
ARRIVAL INFORMATION:  Verified patient name and date of birth, scheduled procedure, and informed consent. : Flori Davis (daughter) contact number:  Physician and staff can share information with the . Belongings with patient include:  Clothing,Dentures upper and lower        GI FOCUSED ASSESSMENT:  Neuro: Awake, alert, oriented x4  Respiratory: even and unlabored   GI: soft and non-distended  EKG Rhythm: normal sinus rhythm    Education:Reviewed general discharge instructions and  information.

## 2023-06-20 ENCOUNTER — TELEPHONE (OUTPATIENT)
Age: 88
End: 2023-06-20

## 2023-07-06 ENCOUNTER — OFFICE VISIT (OUTPATIENT)
Age: 88
End: 2023-07-06
Payer: MEDICARE

## 2023-07-06 VITALS
DIASTOLIC BLOOD PRESSURE: 64 MMHG | TEMPERATURE: 98.3 F | SYSTOLIC BLOOD PRESSURE: 172 MMHG | BODY MASS INDEX: 27.56 KG/M2 | HEIGHT: 60 IN | HEART RATE: 71 BPM | OXYGEN SATURATION: 96 % | WEIGHT: 140.4 LBS | RESPIRATION RATE: 17 BRPM

## 2023-07-06 DIAGNOSIS — K86.2 CYSTIC MASS OF PANCREAS: Primary | ICD-10-CM

## 2023-07-06 PROCEDURE — G8419 CALC BMI OUT NRM PARAM NOF/U: HCPCS | Performed by: SURGERY

## 2023-07-06 PROCEDURE — 1036F TOBACCO NON-USER: CPT | Performed by: SURGERY

## 2023-07-06 PROCEDURE — 1123F ACP DISCUSS/DSCN MKR DOCD: CPT | Performed by: SURGERY

## 2023-07-06 PROCEDURE — 1090F PRES/ABSN URINE INCON ASSESS: CPT | Performed by: SURGERY

## 2023-07-06 PROCEDURE — 99205 OFFICE O/P NEW HI 60 MIN: CPT | Performed by: SURGERY

## 2023-07-06 PROCEDURE — G8427 DOCREV CUR MEDS BY ELIG CLIN: HCPCS | Performed by: SURGERY

## 2023-07-06 RX ORDER — METFORMIN HYDROCHLORIDE 500 MG/1
500 TABLET, EXTENDED RELEASE ORAL
COMMUNITY
End: 2023-07-07 | Stop reason: ALTCHOICE

## 2023-07-06 ASSESSMENT — PATIENT HEALTH QUESTIONNAIRE - PHQ9
1. LITTLE INTEREST OR PLEASURE IN DOING THINGS: 0
SUM OF ALL RESPONSES TO PHQ9 QUESTIONS 1 & 2: 0
2. FEELING DOWN, DEPRESSED OR HOPELESS: 0
SUM OF ALL RESPONSES TO PHQ QUESTIONS 1-9: 0

## 2023-07-07 ENCOUNTER — OFFICE VISIT (OUTPATIENT)
Age: 88
End: 2023-07-07
Payer: MEDICARE

## 2023-07-07 VITALS
WEIGHT: 141 LBS | TEMPERATURE: 97.6 F | RESPIRATION RATE: 12 BRPM | HEIGHT: 60 IN | BODY MASS INDEX: 27.68 KG/M2 | HEART RATE: 50 BPM | OXYGEN SATURATION: 98 % | SYSTOLIC BLOOD PRESSURE: 122 MMHG | DIASTOLIC BLOOD PRESSURE: 50 MMHG

## 2023-07-07 DIAGNOSIS — Z00.00 MEDICARE ANNUAL WELLNESS VISIT, SUBSEQUENT: Primary | ICD-10-CM

## 2023-07-07 DIAGNOSIS — M15.9 PRIMARY OSTEOARTHRITIS INVOLVING MULTIPLE JOINTS: ICD-10-CM

## 2023-07-07 DIAGNOSIS — Z79.899 ENCOUNTER FOR LONG-TERM (CURRENT) USE OF MEDICATIONS: ICD-10-CM

## 2023-07-07 DIAGNOSIS — D64.9 CHRONIC ANEMIA: ICD-10-CM

## 2023-07-07 DIAGNOSIS — I10 ESSENTIAL (PRIMARY) HYPERTENSION: ICD-10-CM

## 2023-07-07 DIAGNOSIS — G47.33 OBSTRUCTIVE SLEEP APNEA (ADULT) (PEDIATRIC): ICD-10-CM

## 2023-07-07 DIAGNOSIS — E11.21 TYPE 2 DIABETES MELLITUS WITH DIABETIC NEPHROPATHY, WITHOUT LONG-TERM CURRENT USE OF INSULIN (HCC): ICD-10-CM

## 2023-07-07 DIAGNOSIS — K86.2 CYST OF PANCREAS: ICD-10-CM

## 2023-07-07 DIAGNOSIS — E78.2 MIXED HYPERLIPIDEMIA: ICD-10-CM

## 2023-07-07 DIAGNOSIS — K21.9 GASTRO-ESOPHAGEAL REFLUX DISEASE WITHOUT ESOPHAGITIS: ICD-10-CM

## 2023-07-07 DIAGNOSIS — C90.02 MULTIPLE MYELOMA IN RELAPSE (HCC): ICD-10-CM

## 2023-07-07 DIAGNOSIS — N18.32 CHRONIC KIDNEY DISEASE, STAGE 3B (HCC): ICD-10-CM

## 2023-07-07 PROCEDURE — G0439 PPPS, SUBSEQ VISIT: HCPCS | Performed by: FAMILY MEDICINE

## 2023-07-07 PROCEDURE — 1123F ACP DISCUSS/DSCN MKR DOCD: CPT | Performed by: FAMILY MEDICINE

## 2023-07-07 PROCEDURE — 1036F TOBACCO NON-USER: CPT | Performed by: FAMILY MEDICINE

## 2023-07-07 PROCEDURE — G8419 CALC BMI OUT NRM PARAM NOF/U: HCPCS | Performed by: FAMILY MEDICINE

## 2023-07-07 PROCEDURE — 99214 OFFICE O/P EST MOD 30 MIN: CPT | Performed by: FAMILY MEDICINE

## 2023-07-07 PROCEDURE — 1090F PRES/ABSN URINE INCON ASSESS: CPT | Performed by: FAMILY MEDICINE

## 2023-07-07 PROCEDURE — G8427 DOCREV CUR MEDS BY ELIG CLIN: HCPCS | Performed by: FAMILY MEDICINE

## 2023-07-07 ASSESSMENT — ENCOUNTER SYMPTOMS
SHORTNESS OF BREATH: 0
CHEST TIGHTNESS: 0
ABDOMINAL PAIN: 0
RHINORRHEA: 0
BLOOD IN STOOL: 0
COUGH: 0
CONSTIPATION: 0

## 2023-07-07 ASSESSMENT — PATIENT HEALTH QUESTIONNAIRE - PHQ9
SUM OF ALL RESPONSES TO PHQ QUESTIONS 1-9: 0
SUM OF ALL RESPONSES TO PHQ QUESTIONS 1-9: 0
SUM OF ALL RESPONSES TO PHQ9 QUESTIONS 1 & 2: 0
SUM OF ALL RESPONSES TO PHQ QUESTIONS 1-9: 0
1. LITTLE INTEREST OR PLEASURE IN DOING THINGS: 0
SUM OF ALL RESPONSES TO PHQ QUESTIONS 1-9: 0
2. FEELING DOWN, DEPRESSED OR HOPELESS: 0

## 2023-07-07 ASSESSMENT — LIFESTYLE VARIABLES
HOW MANY STANDARD DRINKS CONTAINING ALCOHOL DO YOU HAVE ON A TYPICAL DAY: 1 OR 2
HOW OFTEN DO YOU HAVE A DRINK CONTAINING ALCOHOL: NEVER

## 2023-07-07 ASSESSMENT — VISUAL ACUITY
OS_CC: 20/40
OD_CC: 20/40

## 2023-07-07 NOTE — PROGRESS NOTES
Subjective:      Patient ID: Abi Wood is a 80 y.o. female. HPI  Follow up on chronic medical problems. Cardiovascular Review:  The patient has hypertension, CARL (but has not been using the CPAP) and hyperlipidemia. Denies chest pain or chest tightness. SOB is at her usual.  No cough or congestion noted. Has some good days and other days feels fatigue but overall this has been better. Diet and Lifestyle: generally follows a low fat low cholesterol diet, generally follows a low sodium diet, follows a diabetic diet regularly  Home BP Monitoring: is well controlled at home, ranging 130s's/70-80's. Pertinent ROS: taking medications as instructed, no medication side effects noted, no TIA's, no chest pain on exertion, no swelling of ankles. Diabetes Mellitus:  She has diabetes mellitus. Tolerating [place] and [place]. Diabetic ROS - diabetic diet compliance: compliant most of the time, home glucose monitoring: is performed regularly, fasting values are still range low to mid 100s,  no low BS. Pt does not have BS log with her today,   Further diabetic ROS: no polyuria or polydipsia, no chest pain, dyspnea or TIA's, no numbness, tingling or pain in extremities, no unusual visual symptoms, no hypoglycemia. Her appetite is good. Lab review: orders written for new lab studies as appropriate; see orders. Chronic anemia and multiple myeloma in remission is being followed by hematology. 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 She had abd MRI w/ MRCP which showed Cystic lesion in the pancreas. Compared to a study from 2011, this has significantly increased in size. With interval development of diffuse pancreatic ductal dilatation. There is enhancing nodularity along the superior aspect of the lesion. Findings are concerning for a cystic neoplasm. She was seen on yesterday by surgeon but has elected not to proceed with surgery. Denies and abd sx or pain.          Past Medical

## 2023-07-07 NOTE — PROGRESS NOTES
Chief Complaint   Patient presents with    Medicare AWV       1. \"Have you been to the ER, urgent care clinic since your last visit? Hospitalized since your last visit? \" No    2. \"Have you seen or consulted any other health care providers outside of the [address] [place] [address] since your last visit? \" No     3. For patients aged 43-73: Has the patient had a colonoscopy / FIT/ Cologuard? N/A      If the patient is female:    4. For patients aged 43-66: Has the patient had a mammogram within the past 2 years? N/A      5. For patients aged 21-65: Has the patient had a pap smear?  N/A      Health Maintenance Due   Topic Date Due    Annual Wellness Visit (AWV)  07/14/2023

## 2023-07-10 ENCOUNTER — TELEPHONE (OUTPATIENT)
Age: 88
End: 2023-07-10

## 2023-07-25 ENCOUNTER — CLINICAL DOCUMENTATION (OUTPATIENT)
Age: 88
End: 2023-07-25

## 2023-07-25 NOTE — PROGRESS NOTES
Received office notes from [address] [place] [address]. Forwarded to Dr. Aleisha Livingston for review.

## 2023-09-19 ENCOUNTER — CLINICAL DOCUMENTATION (OUTPATIENT)
Age: 88
End: 2023-09-19

## 2023-09-19 NOTE — PROGRESS NOTES
Received office notes from [address] [place] [address]. Forwarded to Dr. Bautista Arellano for review.

## 2023-09-25 ENCOUNTER — TELEPHONE (OUTPATIENT)
Age: 88
End: 2023-09-25

## 2023-09-25 NOTE — TELEPHONE ENCOUNTER
Patient states the [address] [facility] is costing over $400 because she is in the \"donut hole\". She is asking for a med change and to please send a 30 d/s of the new medication to [facility] [address] [employer]. Last visit:  7/7/23  Next appt:  10/11/23        For Pharmacy Admin Tracking Only    Program: Medication Refill  CPA in place:    Recommendation Provided To:    Intervention Detail: New Rx: 1, reason: Cost/Formulary Change  Intervention Accepted By:   Niles Acevedo Closed?:    Time Spent (min): 5

## 2023-09-26 ENCOUNTER — SCHEDULED TELEPHONE ENCOUNTER (OUTPATIENT)
Age: 88
End: 2023-09-26

## 2023-09-26 DIAGNOSIS — E11.21 TYPE 2 DIABETES MELLITUS WITH DIABETIC NEPHROPATHY, WITHOUT LONG-TERM CURRENT USE OF INSULIN (HCC): Primary | ICD-10-CM

## 2023-09-26 NOTE — PROGRESS NOTES
Pt referred by Dr. Gab Aranda for mediation access issues. She is in the donut hole and cannot get her [place] and [place] any longer   She has 12 tablets left  We only have januvia 100 mg tablets and she's on 25 mg  Reviewed her last A1c and renal function  Last a1c was 5.9%. Est CrCl: 30 mL/min  Discussed with PCP having pt trial off of [place] and [place] given age / A1c and monitor BG. To see if she needs therapy as given her renal function etc. I do not feel like she should be on a higher dose than 25 mg a day and we cannot get those as samples. PCP called patient to confirm with her  Will monitor BS at home and called if over 150s. All questions were answered and patient verbalized understanding. Leesa Mulligan, PharmD, [address] Only    Program: Medical Group  CPA in place:  Yes  Recommendation Provided To: Patient/Caregiver: 1 via Telephone  Intervention Detail: Discontinued Rx: 1, reason: Cost/Formulary Change  Intervention Accepted By: Patient/Caregiver: 1  Time Spent (min): 30

## 2023-09-28 NOTE — TELEPHONE ENCOUNTER
Pt called. Increased price of the [place] and [place]. Will  stay off of januvia for now. Will monitor BS at home and called if over 150s. Last a1c was 5.9%.

## 2023-10-04 RX ORDER — SPIRONOLACTONE 25 MG/1
TABLET ORAL
Qty: 180 TABLET | Refills: 3 | Status: SHIPPED | OUTPATIENT
Start: 2023-10-04

## 2023-10-04 RX ORDER — AMLODIPINE BESYLATE 10 MG/1
TABLET ORAL
Qty: 90 TABLET | Refills: 3 | Status: SHIPPED | OUTPATIENT
Start: 2023-10-04

## 2023-10-04 NOTE — TELEPHONE ENCOUNTER
Last appointment: 7/7/23  Next appointment: 10/11/23  Previous refill encounter(s): 11/2/22 #90 with 3 refills    Requested Prescriptions     Pending Prescriptions Disp Refills    amLODIPine (NORVASC) 10 MG tablet [Pharmacy Med Name: amLODIPine Besylate 10 MG Oral Tablet] 90 tablet 3     Sig: TAKE 1 TABLET BY MOUTH ONCE  DAILY    spironolactone (ALDACTONE) 25 MG tablet [Pharmacy Med Name: Spironolactone 25 MG Oral Tablet] 180 tablet 3     Sig: TAKE 1 TABLET BY MOUTH TWICE  DAILY         For Pharmacy Admin Tracking Only    Program: Medication Refill  CPA in place:    Recommendation Provided To:    Intervention Detail: New Rx: 2, reason: Patient Preference  Intervention Accepted By:   Branden Arvizu Closed?:    Time Spent (min): 5

## 2023-10-11 ENCOUNTER — OFFICE VISIT (OUTPATIENT)
Age: 88
End: 2023-10-11
Payer: MEDICARE

## 2023-10-11 VITALS
SYSTOLIC BLOOD PRESSURE: 160 MMHG | OXYGEN SATURATION: 99 % | WEIGHT: 140.6 LBS | TEMPERATURE: 98.4 F | DIASTOLIC BLOOD PRESSURE: 50 MMHG | HEART RATE: 60 BPM | HEIGHT: 60 IN | RESPIRATION RATE: 20 BRPM | BODY MASS INDEX: 27.61 KG/M2

## 2023-10-11 DIAGNOSIS — I10 ESSENTIAL (PRIMARY) HYPERTENSION: Primary | ICD-10-CM

## 2023-10-11 DIAGNOSIS — E78.2 MIXED HYPERLIPIDEMIA: ICD-10-CM

## 2023-10-11 DIAGNOSIS — Z23 ENCOUNTER FOR IMMUNIZATION: ICD-10-CM

## 2023-10-11 DIAGNOSIS — Z79.899 ENCOUNTER FOR LONG-TERM (CURRENT) USE OF MEDICATIONS: ICD-10-CM

## 2023-10-11 DIAGNOSIS — E11.21 TYPE 2 DIABETES MELLITUS WITH DIABETIC NEPHROPATHY, WITHOUT LONG-TERM CURRENT USE OF INSULIN (HCC): ICD-10-CM

## 2023-10-11 DIAGNOSIS — Z85.79 HISTORY OF MULTIPLE MYELOMA: ICD-10-CM

## 2023-10-11 DIAGNOSIS — N18.32 CHRONIC KIDNEY DISEASE, STAGE 3B (HCC): ICD-10-CM

## 2023-10-11 DIAGNOSIS — D64.9 CHRONIC ANEMIA: ICD-10-CM

## 2023-10-11 LAB
ALBUMIN SERPL-MCNC: 3.5 G/DL (ref 3.5–5)
ALBUMIN/GLOB SERPL: 0.6 (ref 1.1–2.2)
ALP SERPL-CCNC: 50 U/L (ref 45–117)
ALT SERPL-CCNC: 13 U/L (ref 12–78)
ANION GAP SERPL CALC-SCNC: 2 MMOL/L (ref 5–15)
AST SERPL-CCNC: 12 U/L (ref 15–37)
BILIRUB SERPL-MCNC: 0.4 MG/DL (ref 0.2–1)
BUN SERPL-MCNC: 34 MG/DL (ref 6–20)
BUN/CREAT SERPL: 21 (ref 12–20)
CALCIUM SERPL-MCNC: 9.4 MG/DL (ref 8.5–10.1)
CHLORIDE SERPL-SCNC: 107 MMOL/L (ref 97–108)
CHOLEST SERPL-MCNC: 121 MG/DL
CO2 SERPL-SCNC: 29 MMOL/L (ref 21–32)
CREAT SERPL-MCNC: 1.65 MG/DL (ref 0.55–1.02)
ERYTHROCYTE [DISTWIDTH] IN BLOOD BY AUTOMATED COUNT: 16.2 % (ref 11.5–14.5)
GLOBULIN SER CALC-MCNC: 5.4 G/DL (ref 2–4)
GLUCOSE SERPL-MCNC: 95 MG/DL (ref 65–100)
GLUCOSE, POC: 140 MG/DL
HBA1C MFR BLD: 6.1 %
HCT VFR BLD AUTO: 29.1 % (ref 35–47)
HDLC SERPL-MCNC: 43 MG/DL
HDLC SERPL: 2.8 (ref 0–5)
HGB BLD-MCNC: 9.4 G/DL (ref 11.5–16)
LDLC SERPL CALC-MCNC: 61 MG/DL (ref 0–100)
MCH RBC QN AUTO: 29.6 PG (ref 26–34)
MCHC RBC AUTO-ENTMCNC: 32.3 G/DL (ref 30–36.5)
MCV RBC AUTO: 91.5 FL (ref 80–99)
NRBC # BLD: 0 K/UL (ref 0–0.01)
NRBC BLD-RTO: 0 PER 100 WBC
PLATELET # BLD AUTO: 135 K/UL (ref 150–400)
PMV BLD AUTO: 11.3 FL (ref 8.9–12.9)
POTASSIUM SERPL-SCNC: 3.9 MMOL/L (ref 3.5–5.1)
PROT SERPL-MCNC: 8.9 G/DL (ref 6.4–8.2)
RBC # BLD AUTO: 3.18 M/UL (ref 3.8–5.2)
SODIUM SERPL-SCNC: 138 MMOL/L (ref 136–145)
TRIGL SERPL-MCNC: 85 MG/DL
VLDLC SERPL CALC-MCNC: 17 MG/DL
WBC # BLD AUTO: 4.1 K/UL (ref 3.6–11)

## 2023-10-11 PROCEDURE — 83036 HEMOGLOBIN GLYCOSYLATED A1C: CPT | Performed by: FAMILY MEDICINE

## 2023-10-11 PROCEDURE — 1123F ACP DISCUSS/DSCN MKR DOCD: CPT | Performed by: FAMILY MEDICINE

## 2023-10-11 PROCEDURE — PBSHW AMB POC HEMOGLOBIN A1C: Performed by: FAMILY MEDICINE

## 2023-10-11 PROCEDURE — PBSHW INFLUENZA, FLUAD, (AGE 65 Y+), IM, PF, 0.5 ML: Performed by: FAMILY MEDICINE

## 2023-10-11 PROCEDURE — 1090F PRES/ABSN URINE INCON ASSESS: CPT | Performed by: FAMILY MEDICINE

## 2023-10-11 PROCEDURE — 82962 GLUCOSE BLOOD TEST: CPT | Performed by: FAMILY MEDICINE

## 2023-10-11 PROCEDURE — 1036F TOBACCO NON-USER: CPT | Performed by: FAMILY MEDICINE

## 2023-10-11 PROCEDURE — G8484 FLU IMMUNIZE NO ADMIN: HCPCS | Performed by: FAMILY MEDICINE

## 2023-10-11 PROCEDURE — G8427 DOCREV CUR MEDS BY ELIG CLIN: HCPCS | Performed by: FAMILY MEDICINE

## 2023-10-11 PROCEDURE — G8419 CALC BMI OUT NRM PARAM NOF/U: HCPCS | Performed by: FAMILY MEDICINE

## 2023-10-11 PROCEDURE — PBSHW AMB POC GLUCOSE BLOOD, BY GLUCOSE MONITORING DEVICE: Performed by: FAMILY MEDICINE

## 2023-10-11 PROCEDURE — 90694 VACC AIIV4 NO PRSRV 0.5ML IM: CPT | Performed by: FAMILY MEDICINE

## 2023-10-11 PROCEDURE — 99214 OFFICE O/P EST MOD 30 MIN: CPT | Performed by: FAMILY MEDICINE

## 2023-10-11 SDOH — ECONOMIC STABILITY: FOOD INSECURITY: WITHIN THE PAST 12 MONTHS, THE FOOD YOU BOUGHT JUST DIDN'T LAST AND YOU DIDN'T HAVE MONEY TO GET MORE.: NEVER TRUE

## 2023-10-11 SDOH — ECONOMIC STABILITY: FOOD INSECURITY: WITHIN THE PAST 12 MONTHS, YOU WORRIED THAT YOUR FOOD WOULD RUN OUT BEFORE YOU GOT MONEY TO BUY MORE.: NEVER TRUE

## 2023-10-11 SDOH — ECONOMIC STABILITY: INCOME INSECURITY: HOW HARD IS IT FOR YOU TO PAY FOR THE VERY BASICS LIKE FOOD, HOUSING, MEDICAL CARE, AND HEATING?: NOT HARD AT ALL

## 2023-10-11 ASSESSMENT — PATIENT HEALTH QUESTIONNAIRE - PHQ9
1. LITTLE INTEREST OR PLEASURE IN DOING THINGS: 0
SUM OF ALL RESPONSES TO PHQ QUESTIONS 1-9: 0
2. FEELING DOWN, DEPRESSED OR HOPELESS: 0
SUM OF ALL RESPONSES TO PHQ QUESTIONS 1-9: 0
SUM OF ALL RESPONSES TO PHQ QUESTIONS 1-9: 0
SUM OF ALL RESPONSES TO PHQ9 QUESTIONS 1 & 2: 0
SUM OF ALL RESPONSES TO PHQ QUESTIONS 1-9: 0

## 2023-10-11 ASSESSMENT — ENCOUNTER SYMPTOMS
ABDOMINAL PAIN: 0
RHINORRHEA: 0
CHEST TIGHTNESS: 0
BLOOD IN STOOL: 0
COUGH: 0
CONSTIPATION: 0
SHORTNESS OF BREATH: 0

## 2023-10-11 NOTE — PROGRESS NOTES
Chief Complaint   Patient presents with    Follow-up       1. \"Have you been to the ER, urgent care clinic since your last visit? Hospitalized since your last visit? \" No    2. \"Have you seen or consulted any other health care providers outside of the [address] [place] [address] since your last visit? \" No     3. For patients aged 43-73: Has the patient had a colonoscopy / FIT/ Cologuard? N/A      If the patient is female:    4. For patients aged 43-66: Has the patient had a mammogram within the past 2 years? N/A      5. For patients aged 21-65: Has the patient had a pap smear?  N/A     Health Maintenance Due   Topic Date Due    COVID-19 Vaccine (6 - Moderna risk series) 01/23/2023    Flu vaccine (1) 08/01/2023
The patient has received an after-visit summary and questions were answered concerning future plans and follow up. Advise pt of any urgent changes then to proceed to the ER.             Kasie Dbuois MD

## 2023-10-20 NOTE — H&P
Reconciliation   cloNIDine (CATAPRES) 0.3 MG tablet Take by mouth 6/7/22   Ar Automatic Reconciliation   Cyanocobalamin 2500 MCG TABS Take by mouth daily 2/13/23   Ar Automatic Reconciliation   minoxidil (LONITEN) 10 MG tablet Take 0.5 tablets by mouth daily 2/17/22   Ar Automatic Reconciliation   mupirocin (BACTROBAN) 2 % cream Apply topically 2 times daily as needed 8/13/19   Ar Automatic Reconciliation   polyethylene glycol (GLYCOLAX) 17 GM/SCOOP powder Take by mouth daily 7/13/22   Ar Automatic Reconciliation   rosuvastatin (CRESTOR) 5 MG tablet TAKE 1 TABLET BY MOUTH AT  NIGHT 1/26/23   Ar Automatic Reconciliation   SITagliptin (JANUVIA) 25 MG tablet Take by mouth daily 2/13/23   Ar Automatic Reconciliation   spironolactone (ALDACTONE) 25 MG tablet TAKE 1 TABLET BY MOUTH  TWICE DAILY 11/2/22   Ar Automatic Reconciliation     Physical Exam:   Vital Signs: There were no vitals taken for this visit.   General: well developed, well nourished   HEENT: unremarkable   Heart: regular rhythm no mumur    Lungs: clear   Abdominal:  benign   Neurological: unremarkable   Extremities: no edema     Findings/Diagnosis: Abnl GI CT with pancreatic body cyst  Plan of Care/Planned Procedure: EUS-FNA with conscious/deep sedation    Signed:  Viji Lopez MD 6/9/2023
denies pain/discomfort (Rating = 0)

## 2023-12-06 RX ORDER — ROSUVASTATIN CALCIUM 5 MG/1
TABLET, COATED ORAL
Qty: 90 TABLET | Refills: 3 | Status: SHIPPED | OUTPATIENT
Start: 2023-12-06

## 2023-12-06 NOTE — PROGRESS NOTES
The patient was identified by name and date of birth. The test was explained to patient and questions were answered prior to testing. Alert and oriented to person, place, time/situation. normal mood and affect. no apparent risk to self or others.

## 2023-12-06 NOTE — TELEPHONE ENCOUNTER
Last appointment: 10/11/23  Next appointment: 12/13/23  Previous refill encounter(s): 1/26/23 #90 with 3 refills    Requested Prescriptions     Pending Prescriptions Disp Refills    rosuvastatin (CRESTOR) 5 MG tablet [Pharmacy Med Name: Rosuvastatin Calcium 5 MG Oral Tablet] 90 tablet 3     Sig: TAKE 1 TABLET BY MOUTH AT [address] [place] [address] Tracking Only    Program: Medication Refill  CPA in place:    Recommendation Provided To:    Intervention Detail: New Rx: 1, reason: Patient Preference  Intervention Accepted By:   Sapphire Vargas Closed?:    Time Spent (min): 5

## 2023-12-13 ENCOUNTER — OFFICE VISIT (OUTPATIENT)
Age: 88
End: 2023-12-13
Payer: MEDICARE

## 2023-12-13 VITALS
BODY MASS INDEX: 27.96 KG/M2 | HEART RATE: 53 BPM | RESPIRATION RATE: 16 BRPM | TEMPERATURE: 98.5 F | WEIGHT: 142.4 LBS | HEIGHT: 60 IN | DIASTOLIC BLOOD PRESSURE: 50 MMHG | OXYGEN SATURATION: 99 % | SYSTOLIC BLOOD PRESSURE: 136 MMHG

## 2023-12-13 DIAGNOSIS — Z79.899 ENCOUNTER FOR LONG-TERM (CURRENT) USE OF MEDICATIONS: ICD-10-CM

## 2023-12-13 DIAGNOSIS — K21.9 GASTRO-ESOPHAGEAL REFLUX DISEASE WITHOUT ESOPHAGITIS: ICD-10-CM

## 2023-12-13 DIAGNOSIS — Z85.79 HISTORY OF MULTIPLE MYELOMA: ICD-10-CM

## 2023-12-13 DIAGNOSIS — I10 ESSENTIAL (PRIMARY) HYPERTENSION: Primary | ICD-10-CM

## 2023-12-13 DIAGNOSIS — E11.21 TYPE 2 DIABETES MELLITUS WITH DIABETIC NEPHROPATHY, WITHOUT LONG-TERM CURRENT USE OF INSULIN (HCC): ICD-10-CM

## 2023-12-13 DIAGNOSIS — N18.32 CHRONIC KIDNEY DISEASE, STAGE 3B (HCC): ICD-10-CM

## 2023-12-13 DIAGNOSIS — D64.9 CHRONIC ANEMIA: ICD-10-CM

## 2023-12-13 DIAGNOSIS — E78.2 MIXED HYPERLIPIDEMIA: ICD-10-CM

## 2023-12-13 PROCEDURE — G8484 FLU IMMUNIZE NO ADMIN: HCPCS | Performed by: FAMILY MEDICINE

## 2023-12-13 PROCEDURE — 1123F ACP DISCUSS/DSCN MKR DOCD: CPT | Performed by: FAMILY MEDICINE

## 2023-12-13 PROCEDURE — 99213 OFFICE O/P EST LOW 20 MIN: CPT | Performed by: FAMILY MEDICINE

## 2023-12-13 PROCEDURE — G8427 DOCREV CUR MEDS BY ELIG CLIN: HCPCS | Performed by: FAMILY MEDICINE

## 2023-12-13 PROCEDURE — 1036F TOBACCO NON-USER: CPT | Performed by: FAMILY MEDICINE

## 2023-12-13 PROCEDURE — 1090F PRES/ABSN URINE INCON ASSESS: CPT | Performed by: FAMILY MEDICINE

## 2023-12-13 PROCEDURE — G8419 CALC BMI OUT NRM PARAM NOF/U: HCPCS | Performed by: FAMILY MEDICINE

## 2023-12-13 SDOH — ECONOMIC STABILITY: FOOD INSECURITY: WITHIN THE PAST 12 MONTHS, THE FOOD YOU BOUGHT JUST DIDN'T LAST AND YOU DIDN'T HAVE MONEY TO GET MORE.: NEVER TRUE

## 2023-12-13 SDOH — ECONOMIC STABILITY: INCOME INSECURITY: HOW HARD IS IT FOR YOU TO PAY FOR THE VERY BASICS LIKE FOOD, HOUSING, MEDICAL CARE, AND HEATING?: NOT HARD AT ALL

## 2023-12-13 SDOH — ECONOMIC STABILITY: FOOD INSECURITY: WITHIN THE PAST 12 MONTHS, YOU WORRIED THAT YOUR FOOD WOULD RUN OUT BEFORE YOU GOT MONEY TO BUY MORE.: NEVER TRUE

## 2023-12-13 ASSESSMENT — ENCOUNTER SYMPTOMS
COUGH: 0
RHINORRHEA: 0
ABDOMINAL PAIN: 0
SHORTNESS OF BREATH: 0
BLOOD IN STOOL: 0
CHEST TIGHTNESS: 0
CONSTIPATION: 0

## 2023-12-13 ASSESSMENT — PATIENT HEALTH QUESTIONNAIRE - PHQ9
SUM OF ALL RESPONSES TO PHQ QUESTIONS 1-9: 0
SUM OF ALL RESPONSES TO PHQ QUESTIONS 1-9: 0
SUM OF ALL RESPONSES TO PHQ9 QUESTIONS 1 & 2: 0
SUM OF ALL RESPONSES TO PHQ QUESTIONS 1-9: 0
2. FEELING DOWN, DEPRESSED OR HOPELESS: 0
SUM OF ALL RESPONSES TO PHQ QUESTIONS 1-9: 0
1. LITTLE INTEREST OR PLEASURE IN DOING THINGS: 0

## 2023-12-29 RX ORDER — CLONIDINE HYDROCHLORIDE 0.3 MG/1
0.3 TABLET ORAL NIGHTLY
Qty: 90 TABLET | Refills: 3 | OUTPATIENT
Start: 2023-12-29

## 2023-12-29 RX ORDER — ALLOPURINOL 100 MG/1
TABLET ORAL
Qty: 90 TABLET | Refills: 3 | OUTPATIENT
Start: 2023-12-29

## 2023-12-29 RX ORDER — MINOXIDIL 10 MG/1
5 TABLET ORAL DAILY
Qty: 45 TABLET | Refills: 3 | OUTPATIENT
Start: 2023-12-29

## 2023-12-29 NOTE — TELEPHONE ENCOUNTER
Catapres, Zyloprim and Loniten were sent on 4/10/23 for a 90 d/s with 3 refills        For Pharmacy Admin Tracking Only    Program: Medication Refill  CPA in place:    Recommendation Provided To:   Intervention Detail: New Rx: 3, reason: Patient Preference  Intervention Accepted By:   Gap Closed?:    Time Spent (min): 5

## 2024-01-03 DIAGNOSIS — R00.1 SINUS BRADYCARDIA: ICD-10-CM

## 2024-01-03 RX ORDER — METOPROLOL TARTRATE 100 MG/1
50 TABLET ORAL 2 TIMES DAILY
Qty: 90 TABLET | Refills: 3 | Status: SHIPPED | OUTPATIENT
Start: 2024-01-03

## 2024-01-03 NOTE — TELEPHONE ENCOUNTER
Last appointment: 12/13/23  Next appointment: 3/13/24  Previous refill encounter(s): 11/2/22    Requested Prescriptions     Pending Prescriptions Disp Refills    metoprolol (LOPRESSOR) 100 MG tablet 90 tablet 3     Sig: Take 0.5 tablets by mouth 2 times daily         For Pharmacy Admin Tracking Only    Program: Medication Refill  CPA in place:    Recommendation Provided To:   Intervention Detail: New Rx: 1, reason: Patient Preference  Intervention Accepted By:   Gap Closed?:    Time Spent (min): 5

## 2024-01-08 ENCOUNTER — HOSPITAL ENCOUNTER (OUTPATIENT)
Facility: HOSPITAL | Age: 89
Discharge: HOME | End: 2024-01-11
Attending: INTERNAL MEDICINE
Payer: MEDICARE

## 2024-01-08 DIAGNOSIS — E87.5 HYPERKALEMIA: ICD-10-CM

## 2024-01-08 DIAGNOSIS — R91.8 OTHER NONSPECIFIC ABNORMAL FINDING OF LUNG FIELD: ICD-10-CM

## 2024-01-08 DIAGNOSIS — C90.00 MULTIPLE MYELOMA NOT HAVING ACHIEVED REMISSION (HCC): ICD-10-CM

## 2024-01-08 DIAGNOSIS — D50.9 IRON DEFICIENCY ANEMIA, UNSPECIFIED IRON DEFICIENCY ANEMIA TYPE: ICD-10-CM

## 2024-01-08 DIAGNOSIS — C25.1 MALIGNANT NEOPLASM OF BODY OF PANCREAS (HCC): ICD-10-CM

## 2024-01-08 PROCEDURE — 2500000003 HC RX 250 WO HCPCS: Performed by: INTERNAL MEDICINE

## 2024-01-08 PROCEDURE — 74176 CT ABD & PELVIS W/O CONTRAST: CPT

## 2024-01-08 RX ADMIN — BARIUM SULFATE 900 ML: 20 SUSPENSION ORAL at 14:15

## 2024-02-23 RX ORDER — ALLOPURINOL 100 MG/1
TABLET ORAL
Qty: 90 TABLET | Refills: 3 | Status: SHIPPED | OUTPATIENT
Start: 2024-02-23

## 2024-02-23 RX ORDER — MINOXIDIL 10 MG/1
5 TABLET ORAL DAILY
Qty: 45 TABLET | Refills: 3 | Status: SHIPPED | OUTPATIENT
Start: 2024-02-23

## 2024-02-23 RX ORDER — CLONIDINE HYDROCHLORIDE 0.3 MG/1
0.3 TABLET ORAL NIGHTLY
Qty: 90 TABLET | Refills: 3 | Status: SHIPPED | OUTPATIENT
Start: 2024-02-23

## 2024-02-23 NOTE — TELEPHONE ENCOUNTER
Pharmacy is requesting a 90 d/s with refills enough to last a year.    Last appointment: 12/13/23  Next appointment: 3/13/24    Requested Prescriptions     Pending Prescriptions Disp Refills    minoxidil (LONITEN) 10 MG tablet [Pharmacy Med Name: MINOXIDIL  10MG  TAB] 45 tablet 3     Sig: TAKE ONE-HALF TABLET BY MOUTH  DAILY    cloNIDine (CATAPRES) 0.3 MG tablet [Pharmacy Med Name: CLONIDINE  0.3MG  TAB] 90 tablet 3     Sig: TAKE 1 TABLET BY MOUTH AT NIGHT    allopurinol (ZYLOPRIM) 100 MG tablet [Pharmacy Med Name: Allopurinol 100 MG Oral Tablet] 90 tablet 3     Sig: TAKE 1 TABLET BY MOUTH DAILY         For Pharmacy Admin Tracking Only    Program: Medication Refill  CPA in place:    Recommendation Provided To:   Intervention Detail: New Rx: 3, reason: Patient Preference  Intervention Accepted By:   Gap Closed?:    Time Spent (min): 5

## 2024-03-13 ENCOUNTER — OFFICE VISIT (OUTPATIENT)
Age: 89
End: 2024-03-13
Payer: MEDICARE

## 2024-03-13 VITALS
HEIGHT: 60 IN | OXYGEN SATURATION: 99 % | RESPIRATION RATE: 16 BRPM | SYSTOLIC BLOOD PRESSURE: 133 MMHG | HEART RATE: 56 BPM | TEMPERATURE: 98.6 F | BODY MASS INDEX: 27.92 KG/M2 | DIASTOLIC BLOOD PRESSURE: 49 MMHG | WEIGHT: 142.2 LBS

## 2024-03-13 DIAGNOSIS — I10 ESSENTIAL (PRIMARY) HYPERTENSION: Primary | ICD-10-CM

## 2024-03-13 DIAGNOSIS — E11.21 TYPE 2 DIABETES MELLITUS WITH DIABETIC NEPHROPATHY, WITHOUT LONG-TERM CURRENT USE OF INSULIN (HCC): ICD-10-CM

## 2024-03-13 DIAGNOSIS — H61.23 BILATERAL IMPACTED CERUMEN: ICD-10-CM

## 2024-03-13 DIAGNOSIS — D64.9 CHRONIC ANEMIA: ICD-10-CM

## 2024-03-13 DIAGNOSIS — N18.32 CHRONIC KIDNEY DISEASE, STAGE 3B (HCC): ICD-10-CM

## 2024-03-13 DIAGNOSIS — E78.2 MIXED HYPERLIPIDEMIA: ICD-10-CM

## 2024-03-13 DIAGNOSIS — Z79.899 ENCOUNTER FOR LONG-TERM (CURRENT) USE OF MEDICATIONS: ICD-10-CM

## 2024-03-13 DIAGNOSIS — Z85.79 HISTORY OF MULTIPLE MYELOMA: ICD-10-CM

## 2024-03-13 LAB
ANION GAP SERPL CALC-SCNC: 5 MMOL/L (ref 5–15)
BUN SERPL-MCNC: 34 MG/DL (ref 6–20)
BUN/CREAT SERPL: 17 (ref 12–20)
CALCIUM SERPL-MCNC: 9.5 MG/DL (ref 8.5–10.1)
CHLORIDE SERPL-SCNC: 109 MMOL/L (ref 97–108)
CO2 SERPL-SCNC: 26 MMOL/L (ref 21–32)
CREAT SERPL-MCNC: 2.03 MG/DL (ref 0.55–1.02)
GLUCOSE SERPL-MCNC: 87 MG/DL (ref 65–100)
GLUCOSE, POC: 262 MG/DL
HBA1C MFR BLD: 6.6 %
POTASSIUM SERPL-SCNC: 4.4 MMOL/L (ref 3.5–5.1)
SODIUM SERPL-SCNC: 140 MMOL/L (ref 136–145)

## 2024-03-13 PROCEDURE — G8427 DOCREV CUR MEDS BY ELIG CLIN: HCPCS | Performed by: FAMILY MEDICINE

## 2024-03-13 PROCEDURE — 1123F ACP DISCUSS/DSCN MKR DOCD: CPT | Performed by: FAMILY MEDICINE

## 2024-03-13 PROCEDURE — PBSHW AMB POC HEMOGLOBIN A1C: Performed by: FAMILY MEDICINE

## 2024-03-13 PROCEDURE — 83036 HEMOGLOBIN GLYCOSYLATED A1C: CPT | Performed by: FAMILY MEDICINE

## 2024-03-13 PROCEDURE — 99214 OFFICE O/P EST MOD 30 MIN: CPT | Performed by: FAMILY MEDICINE

## 2024-03-13 PROCEDURE — PBSHW AMB POC GLUCOSE BLOOD, BY GLUCOSE MONITORING DEVICE: Performed by: FAMILY MEDICINE

## 2024-03-13 PROCEDURE — G8484 FLU IMMUNIZE NO ADMIN: HCPCS | Performed by: FAMILY MEDICINE

## 2024-03-13 PROCEDURE — G8419 CALC BMI OUT NRM PARAM NOF/U: HCPCS | Performed by: FAMILY MEDICINE

## 2024-03-13 PROCEDURE — 1036F TOBACCO NON-USER: CPT | Performed by: FAMILY MEDICINE

## 2024-03-13 PROCEDURE — 1090F PRES/ABSN URINE INCON ASSESS: CPT | Performed by: FAMILY MEDICINE

## 2024-03-13 PROCEDURE — 82962 GLUCOSE BLOOD TEST: CPT | Performed by: FAMILY MEDICINE

## 2024-03-13 RX ORDER — SITAGLIPTIN 25 MG/1
TABLET, FILM COATED ORAL
COMMUNITY
Start: 2024-01-22 | End: 2024-03-13 | Stop reason: DRUGHIGH

## 2024-03-13 ASSESSMENT — ENCOUNTER SYMPTOMS
BLOOD IN STOOL: 0
RHINORRHEA: 0
ABDOMINAL PAIN: 0
COUGH: 0
SHORTNESS OF BREATH: 0
CONSTIPATION: 0
CHEST TIGHTNESS: 0

## 2024-03-13 ASSESSMENT — PATIENT HEALTH QUESTIONNAIRE - PHQ9
SUM OF ALL RESPONSES TO PHQ QUESTIONS 1-9: 0
2. FEELING DOWN, DEPRESSED OR HOPELESS: 0
SUM OF ALL RESPONSES TO PHQ9 QUESTIONS 1 & 2: 0
SUM OF ALL RESPONSES TO PHQ QUESTIONS 1-9: 0
1. LITTLE INTEREST OR PLEASURE IN DOING THINGS: 0
SUM OF ALL RESPONSES TO PHQ QUESTIONS 1-9: 0
SUM OF ALL RESPONSES TO PHQ QUESTIONS 1-9: 0

## 2024-03-13 NOTE — PROGRESS NOTES
Subjective:      Patient ID: Salma Feng is a 89 y.o. female.    HPI  Follow up on chronic medical problems.  Overall feeling well.    Cardiovascular Review:  The patient has hypertension, CARL (but has not been using the CPAP) and hyperlipidemia.  Denies chest pain or chest tightness.  SOB is at her usual.  No cough or congestion noted.  Has some good days and other days feels fatigue but overall this has been better.      Diet and Lifestyle: generally follows a low fat low cholesterol diet, generally follows a low sodium diet, follows a diabetic diet regularly  Home BP Monitoring: is well controlled at home, ranging 130s's/70-80's.    Pertinent ROS: taking medications as instructed, no medication side effects noted, no TIA's, no chest pain on exertion, no swelling of ankles.    Diabetes Mellitus:  She has diabetes mellitus.  Went back on the janivia now that her inusance is covering it.  Off of januvia d/t cost of the medication.          Diabetic ROS - diabetic diet compliance: compliant most of the time, home glucose monitoring: is performed regularly, fasting values are still range low to mid 100s,  no low BS.  Pt does not have BS log with her today,   Further diabetic ROS: no polyuria or polydipsia, no chest pain, dyspnea or TIA's, no numbness, tingling or pain in extremities, no unusual visual symptoms, no hypoglycemia.  Her appetite is good.    Lab review: orders written for new lab studies as appropriate; see orders.      Chronic anemia and multiple myeloma in remission is being followed by hematology.      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 She had abd MRI w/ MRCP which showed Cystic lesion in the pancreas. Compared to a study from 2011, this has significantly increased in size. With interval development of diffuse pancreatic ductal dilatation. There is enhancing nodularity along the superior aspect of the lesion. Findings are concerning for a cystic neoplasm.  She was

## 2024-03-13 NOTE — PROGRESS NOTES
Chief Complaint   Patient presents with    3 Month Follow-Up       \"Have you been to the ER, urgent care clinic since your last visit?  Hospitalized since your last visit?\"    NO    “Have you seen or consulted any other health care providers outside of [facility] since your last visit?”    NO             Vitals:    24 0859   BP: (!) 133/49   Pulse:    Resp:    Temp:    SpO2:        Health Maintenance Due   Topic Date Due    Respiratory Syncytial Virus (RSV) Pregnant or age 60 yrs+ (1 - 1-dose 60+ series) Never done    COVID-19 Vaccine (2023- season) 2023        The patient, Salma Feng, identity was verified by name and .

## 2024-03-20 ENCOUNTER — TRANSCRIBE ORDERS (OUTPATIENT)
Facility: HOSPITAL | Age: 89
End: 2024-03-20

## 2024-03-20 DIAGNOSIS — E85.9 MYELOMA ASSOCIATED AMYLOIDOSIS (HCC): ICD-10-CM

## 2024-03-20 DIAGNOSIS — R59.1 LYMPHADENOPATHY: ICD-10-CM

## 2024-03-20 DIAGNOSIS — C25.1 MALIGNANT NEOPLASM OF BODY OF PANCREAS (HCC): Primary | ICD-10-CM

## 2024-03-20 DIAGNOSIS — C90.00 MYELOMA ASSOCIATED AMYLOIDOSIS (HCC): ICD-10-CM

## 2024-03-24 DIAGNOSIS — E11.21 TYPE 2 DIABETES MELLITUS WITH DIABETIC NEPHROPATHY, WITHOUT LONG-TERM CURRENT USE OF INSULIN (HCC): ICD-10-CM

## 2024-03-26 RX ORDER — SITAGLIPTIN 25 MG/1
TABLET, FILM COATED ORAL
Qty: 90 TABLET | Refills: 3 | Status: SHIPPED | OUTPATIENT
Start: 2024-03-26

## 2024-03-26 NOTE — TELEPHONE ENCOUNTER
Pharmacy is requesting a 90 d/s with refills enough to last a year.    Last appointment: 3/13/24  Next appointment: 9/13/24    Requested Prescriptions     Pending Prescriptions Disp Refills    JANUVIA 25 MG tablet [Pharmacy Med Name: Januvia 25 MG Oral Tablet] 90 tablet 3     Sig: TAKE 1 TABLET BY MOUTH DAILY FOR TYPE 2 DIABETES MELLITUS         For Pharmacy Admin Tracking Only    Program: Medication Refill  CPA in place:    Recommendation Provided To:   Intervention Detail: New Rx: 1, reason: Patient Preference  Intervention Accepted By:   Gap Closed?:    Time Spent (min): 5

## 2024-04-30 ENCOUNTER — TELEPHONE (OUTPATIENT)
Age: 89
End: 2024-04-30

## 2024-05-07 ENCOUNTER — TELEPHONE (OUTPATIENT)
Age: 89
End: 2024-05-07

## 2024-05-07 NOTE — TELEPHONE ENCOUNTER
----- Message from Marichuy Pérez MA sent at 5/6/2024  3:31 PM EDT -----  Subject: Message to Provider    QUESTIONS  Information for Provider? Patient is calling wanting to speak to Sherita   Requesting for her to call her back at her convenience. Please advise   ---------------------------------------------------------------------------  --------------  CALL BACK INFO  [phone]; OK to leave message on voicemail  ---------------------------------------------------------------------------  --------------  SCRIPT ANSWERS  Relationship to Patient? Self

## 2024-05-07 NOTE — TELEPHONE ENCOUNTER
----- Message from Marichuy Pérez MA sent at 5/6/2024  3:31 PM EDT -----  Subject: Message to Provider    QUESTIONS  Information for Provider? Patient is calling wanting to speak to Sherita   Requesting for her to call her back at her convenience. Please advise   ---------------------------------------------------------------------------  --------------  CALL BACK INFO  [phone]; OK to leave message on voicemail  ---------------------------------------------------------------------------  --------------  SCRIPT ANSWERS  Relationship to Patient? Self

## 2024-05-08 ENCOUNTER — HOSPITAL ENCOUNTER (OUTPATIENT)
Facility: HOSPITAL | Age: 89
Discharge: HOME | End: 2024-05-11
Attending: INTERNAL MEDICINE
Payer: MEDICARE

## 2024-05-08 DIAGNOSIS — C25.1 MALIGNANT NEOPLASM OF BODY OF PANCREAS (HCC): ICD-10-CM

## 2024-05-08 DIAGNOSIS — C90.00 MYELOMA ASSOCIATED AMYLOIDOSIS (HCC): ICD-10-CM

## 2024-05-08 DIAGNOSIS — E85.9 MYELOMA ASSOCIATED AMYLOIDOSIS (HCC): ICD-10-CM

## 2024-05-08 DIAGNOSIS — R59.1 LYMPHADENOPATHY: ICD-10-CM

## 2024-05-08 PROCEDURE — 74176 CT ABD & PELVIS W/O CONTRAST: CPT

## 2024-05-13 ENCOUNTER — OFFICE VISIT (OUTPATIENT)
Age: 89
End: 2024-05-13
Payer: MEDICARE

## 2024-05-13 VITALS
WEIGHT: 143.4 LBS | SYSTOLIC BLOOD PRESSURE: 150 MMHG | BODY MASS INDEX: 28.16 KG/M2 | OXYGEN SATURATION: 99 % | RESPIRATION RATE: 16 BRPM | DIASTOLIC BLOOD PRESSURE: 57 MMHG | HEIGHT: 60 IN | TEMPERATURE: 98.6 F | HEART RATE: 56 BPM

## 2024-05-13 DIAGNOSIS — M79.675 TOE PAIN, BILATERAL: ICD-10-CM

## 2024-05-13 DIAGNOSIS — M21.611 BILATERAL BUNIONS: Primary | ICD-10-CM

## 2024-05-13 DIAGNOSIS — M79.674 TOE PAIN, BILATERAL: ICD-10-CM

## 2024-05-13 DIAGNOSIS — M21.612 BILATERAL BUNIONS: Primary | ICD-10-CM

## 2024-05-13 PROCEDURE — 1036F TOBACCO NON-USER: CPT | Performed by: FAMILY MEDICINE

## 2024-05-13 PROCEDURE — 1123F ACP DISCUSS/DSCN MKR DOCD: CPT | Performed by: FAMILY MEDICINE

## 2024-05-13 PROCEDURE — 99212 OFFICE O/P EST SF 10 MIN: CPT | Performed by: FAMILY MEDICINE

## 2024-05-13 PROCEDURE — 1090F PRES/ABSN URINE INCON ASSESS: CPT | Performed by: FAMILY MEDICINE

## 2024-05-13 PROCEDURE — G8427 DOCREV CUR MEDS BY ELIG CLIN: HCPCS | Performed by: FAMILY MEDICINE

## 2024-05-13 PROCEDURE — G8419 CALC BMI OUT NRM PARAM NOF/U: HCPCS | Performed by: FAMILY MEDICINE

## 2024-05-13 RX ORDER — PREDNISONE 10 MG/1
10 TABLET ORAL 2 TIMES DAILY
Qty: 10 TABLET | Refills: 0 | Status: SHIPPED | OUTPATIENT
Start: 2024-05-13 | End: 2024-05-18

## 2024-05-13 RX ORDER — PREDNISONE 10 MG/1
10 TABLET ORAL 2 TIMES DAILY
Qty: 10 TABLET | Refills: 0 | Status: SHIPPED | OUTPATIENT
Start: 2024-05-13 | End: 2024-05-13 | Stop reason: SDUPTHER

## 2024-05-13 ASSESSMENT — ENCOUNTER SYMPTOMS
CONSTIPATION: 0
RHINORRHEA: 0
ABDOMINAL PAIN: 0
BLOOD IN STOOL: 0
COUGH: 0
SHORTNESS OF BREATH: 0

## 2024-05-13 ASSESSMENT — PATIENT HEALTH QUESTIONNAIRE - PHQ9
SUM OF ALL RESPONSES TO PHQ QUESTIONS 1-9: 0
SUM OF ALL RESPONSES TO PHQ9 QUESTIONS 1 & 2: 0
SUM OF ALL RESPONSES TO PHQ QUESTIONS 1-9: 0
1. LITTLE INTEREST OR PLEASURE IN DOING THINGS: NOT AT ALL
2. FEELING DOWN, DEPRESSED OR HOPELESS: NOT AT ALL

## 2024-05-13 NOTE — PROGRESS NOTES
Chief Complaint   Patient presents with    Foot Pain     Patient presents with bilateral foot pain and swelling.       \"Have you been to the ER, urgent care clinic since your last visit?  Hospitalized since your last visit?\"    Yes, [facility] for foot pain.    “Have you seen or consulted any other health care providers outside of [facility] since your last visit?”    NO             Vitals:    24 1410   BP: (!) 150/57   Pulse:    Resp:    Temp:    SpO2:        Health Maintenance Due   Topic Date Due    Respiratory Syncytial Virus (RSV) Pregnant or age 60 yrs+ (1 - 1-dose 60+ series) Never done    COVID-19 Vaccine ( season) 2023        The patient, Salma Feng, identity was verified by name and .

## 2024-05-13 NOTE — PROGRESS NOTES
Subjective:      Patient ID: Salma Feng is a 89 y.o. female.    HPI  B/l foot pain and swelling over the past month.  Swelling and pain comes and goes mostly in the big toes and does not hurt everyday and does not stay all day.  Increased pain with walking.  Worse in left foot.  No injury noted.  Pain is 7/10.  Has not taken anything for the pain.  She thinks it feels like her gout pain.      Past Medical History:   Diagnosis Date    Anemia     Benign neoplasm of adrenal gland     Bowel trouble     Chronic anemia     Chronic constipation     Chronic kidney disease     CKD (chronic kidney disease)     stage III; Dr. Crawley    Diabetes (HCC)     MEDINA (dyspnea on exertion)     GERD (gastroesophageal reflux disease)     Gout     HTN (hypertension)     Multiple myeloma (HCC) 1999    Dr. Candace Rutherford    Pure hypercholesterolemia     Sleep apnea     no CPAP    Spinal stenosis      Past Surgical History:   Procedure Laterality Date    COLONOSCOPY N/A 7/15/2019    flex sig , polypectomy performed by Oliver Fry MD at \[facility]\"" AMBULATORY OR    COLONOSCOPY FLX DX W/COLLJ SPEC WHEN PFRMD  08/10/2010        CT BONE MARROW BIOPSY  12/28/2022    CT BONE MARROW BIOPSY 12/28/2022 \[facility]\"" RAD CT    HYSTERECTOMY (CERVIX STATUS UNKNOWN)  1970s    ORTHOPEDIC SURGERY  2005    left hand-trigger finger    ORTHOPEDIC SURGERY  2007    right hand- trigger finger    UPPER GASTROINTESTINAL ENDOSCOPY N/A 6/9/2023    EGD W/EUS FNA performed by Dave Jarrett MD at \[facility]\"" ENDOSCOPY    UPPER GASTROINTESTINAL ENDOSCOPY N/A 6/9/2023    EGD ESOPHAGOGASTRODUODENOSCOPY performed by Dave Jarrett MD at \[facility]\"" ENDOSCOPY     Current Outpatient Medications   Medication Sig Dispense Refill    JANUVIA 25 MG tablet TAKE 1 TABLET BY MOUTH DAILY FOR TYPE 2 DIABETES MELLITUS 90 tablet 3    minoxidil (LONITEN) 10 MG tablet TAKE ONE-HALF TABLET BY MOUTH  DAILY 45 tablet 3    cloNIDine (CATAPRES) 0.3 MG tablet TAKE 1 TABLET BY MOUTH AT NIGHT 90 tablet 3    allopurinol

## 2024-06-03 ENCOUNTER — OFFICE VISIT (OUTPATIENT)
Age: 89
End: 2024-06-03
Payer: MEDICARE

## 2024-06-03 VITALS
HEIGHT: 62 IN | RESPIRATION RATE: 16 BRPM | OXYGEN SATURATION: 95 % | DIASTOLIC BLOOD PRESSURE: 46 MMHG | TEMPERATURE: 98.4 F | SYSTOLIC BLOOD PRESSURE: 131 MMHG | BODY MASS INDEX: 26.02 KG/M2 | WEIGHT: 141.4 LBS | HEART RATE: 66 BPM

## 2024-06-03 DIAGNOSIS — Z79.899 ENCOUNTER FOR LONG-TERM (CURRENT) USE OF MEDICATIONS: ICD-10-CM

## 2024-06-03 DIAGNOSIS — E78.2 MIXED HYPERLIPIDEMIA: ICD-10-CM

## 2024-06-03 DIAGNOSIS — Z85.79 HISTORY OF MULTIPLE MYELOMA: ICD-10-CM

## 2024-06-03 DIAGNOSIS — I10 ESSENTIAL (PRIMARY) HYPERTENSION: Primary | ICD-10-CM

## 2024-06-03 DIAGNOSIS — N18.32 CHRONIC KIDNEY DISEASE, STAGE 3B (HCC): ICD-10-CM

## 2024-06-03 DIAGNOSIS — E11.21 TYPE 2 DIABETES MELLITUS WITH DIABETIC NEPHROPATHY, WITHOUT LONG-TERM CURRENT USE OF INSULIN (HCC): ICD-10-CM

## 2024-06-03 DIAGNOSIS — D64.9 CHRONIC ANEMIA: ICD-10-CM

## 2024-06-03 DIAGNOSIS — Z87.39 HISTORY OF GOUT: ICD-10-CM

## 2024-06-03 LAB
ALBUMIN SERPL-MCNC: 3.3 G/DL (ref 3.5–5)
ALBUMIN/GLOB SERPL: 0.6 (ref 1.1–2.2)
ALP SERPL-CCNC: 51 U/L (ref 45–117)
ALT SERPL-CCNC: 18 U/L (ref 12–78)
ANION GAP SERPL CALC-SCNC: 5 MMOL/L (ref 5–15)
AST SERPL-CCNC: 13 U/L (ref 15–37)
BILIRUB SERPL-MCNC: 0.4 MG/DL (ref 0.2–1)
BUN SERPL-MCNC: 29 MG/DL (ref 6–20)
BUN/CREAT SERPL: 14 (ref 12–20)
CALCIUM SERPL-MCNC: 9.6 MG/DL (ref 8.5–10.1)
CHLORIDE SERPL-SCNC: 107 MMOL/L (ref 97–108)
CHOLEST SERPL-MCNC: 126 MG/DL
CO2 SERPL-SCNC: 28 MMOL/L (ref 21–32)
CREAT SERPL-MCNC: 2.05 MG/DL (ref 0.55–1.02)
ERYTHROCYTE [DISTWIDTH] IN BLOOD BY AUTOMATED COUNT: 16.3 % (ref 11.5–14.5)
GLOBULIN SER CALC-MCNC: 5.3 G/DL (ref 2–4)
GLUCOSE SERPL-MCNC: 120 MG/DL (ref 65–100)
GLUCOSE, POC: 199 MG/DL
HBA1C MFR BLD: 6.7 %
HCT VFR BLD AUTO: 28.6 % (ref 35–47)
HDLC SERPL-MCNC: 43 MG/DL
HDLC SERPL: 2.9 (ref 0–5)
HGB BLD-MCNC: 9.5 G/DL (ref 11.5–16)
LDLC SERPL CALC-MCNC: 63.8 MG/DL (ref 0–100)
MCH RBC QN AUTO: 29.1 PG (ref 26–34)
MCHC RBC AUTO-ENTMCNC: 33.2 G/DL (ref 30–36.5)
MCV RBC AUTO: 87.5 FL (ref 80–99)
NRBC # BLD: 0 K/UL (ref 0–0.01)
NRBC BLD-RTO: 0 PER 100 WBC
PLATELET # BLD AUTO: 151 K/UL (ref 150–400)
PMV BLD AUTO: 10.9 FL (ref 8.9–12.9)
POTASSIUM SERPL-SCNC: 4.1 MMOL/L (ref 3.5–5.1)
PROT SERPL-MCNC: 8.6 G/DL (ref 6.4–8.2)
RBC # BLD AUTO: 3.27 M/UL (ref 3.8–5.2)
SODIUM SERPL-SCNC: 140 MMOL/L (ref 136–145)
TRIGL SERPL-MCNC: 96 MG/DL
VLDLC SERPL CALC-MCNC: 19.2 MG/DL
WBC # BLD AUTO: 3.8 K/UL (ref 3.6–11)

## 2024-06-03 PROCEDURE — 1090F PRES/ABSN URINE INCON ASSESS: CPT | Performed by: FAMILY MEDICINE

## 2024-06-03 PROCEDURE — 82962 GLUCOSE BLOOD TEST: CPT | Performed by: FAMILY MEDICINE

## 2024-06-03 PROCEDURE — G8419 CALC BMI OUT NRM PARAM NOF/U: HCPCS | Performed by: FAMILY MEDICINE

## 2024-06-03 PROCEDURE — 99214 OFFICE O/P EST MOD 30 MIN: CPT | Performed by: FAMILY MEDICINE

## 2024-06-03 PROCEDURE — 1123F ACP DISCUSS/DSCN MKR DOCD: CPT | Performed by: FAMILY MEDICINE

## 2024-06-03 PROCEDURE — 83036 HEMOGLOBIN GLYCOSYLATED A1C: CPT | Performed by: FAMILY MEDICINE

## 2024-06-03 PROCEDURE — 1036F TOBACCO NON-USER: CPT | Performed by: FAMILY MEDICINE

## 2024-06-03 PROCEDURE — PBSHW AMB POC HEMOGLOBIN A1C: Performed by: FAMILY MEDICINE

## 2024-06-03 PROCEDURE — G8427 DOCREV CUR MEDS BY ELIG CLIN: HCPCS | Performed by: FAMILY MEDICINE

## 2024-06-03 PROCEDURE — PBSHW AMB POC GLUCOSE BLOOD, BY GLUCOSE MONITORING DEVICE: Performed by: FAMILY MEDICINE

## 2024-06-03 RX ORDER — SPIRONOLACTONE 25 MG/1
50 TABLET ORAL DAILY
COMMUNITY
Start: 2024-06-03

## 2024-06-03 ASSESSMENT — ENCOUNTER SYMPTOMS
ABDOMINAL PAIN: 0
SHORTNESS OF BREATH: 0
RHINORRHEA: 0
BLOOD IN STOOL: 0
CHEST TIGHTNESS: 0
CONSTIPATION: 0
COUGH: 0

## 2024-06-03 ASSESSMENT — PATIENT HEALTH QUESTIONNAIRE - PHQ9
2. FEELING DOWN, DEPRESSED OR HOPELESS: NOT AT ALL
SUM OF ALL RESPONSES TO PHQ QUESTIONS 1-9: 0
SUM OF ALL RESPONSES TO PHQ9 QUESTIONS 1 & 2: 0
SUM OF ALL RESPONSES TO PHQ QUESTIONS 1-9: 0
1. LITTLE INTEREST OR PLEASURE IN DOING THINGS: NOT AT ALL

## 2024-06-03 NOTE — PROGRESS NOTES
Chief Complaint   Patient presents with    3 week follow up     Patient states she is here for a follow up for Gout.       1. \"Have you been to the ER, urgent care clinic since your last visit?  Hospitalized since your last visit?\" No    2. \"Have you seen or consulted any other health care providers outside of the [facility] since your last visit?\" [facility]     3. For patients aged 45-75: Has the patient had a colonoscopy / FIT/ Cologuard? N/A      If the patient is female:    4. For patients aged 40-74: Has the patient had a mammogram within the past 2 years? N/A      5. For patients aged 21-65: Has the patient had a pap smear? N/A        The patient, Salma Son, identity was verified by name and .    Health Maintenance Due   Topic Date Due    Respiratory Syncytial Virus (RSV) Pregnant or age 60 yrs+ (1 - 1-dose 60+ series) Never done    COVID-19 Vaccine (2023-24 season) 2023      
pain that comes and goes.). Negative for joint swelling and myalgias.   Skin:  Negative for rash.   Allergic/Immunologic: Negative for environmental allergies.   Neurological:  Negative for dizziness, light-headedness and headaches.   Psychiatric/Behavioral:  Negative for dysphoric mood and sleep disturbance. The patient is not nervous/anxious.        Objective:   Physical Exam  Constitutional:       Appearance: Normal appearance.      Comments: BP (!) 131/46   Pulse 66   Temp 98.4 °F (36.9 °C) (Oral)   Resp 16   Ht 1.575 m (5' 2\")   Wt 64.1 kg (141 lb 6.4 oz)   SpO2 95%   BMI 25.86 kg/m²    HENT:      Right Ear: Tympanic membrane normal.      Left Ear: Tympanic membrane normal.      Nose: No rhinorrhea.      Mouth/Throat:      Mouth: Mucous membranes are moist.   Cardiovascular:      Rate and Rhythm: Normal rate and regular rhythm.   Pulmonary:      Effort: Pulmonary effort is normal.      Breath sounds: Normal breath sounds.   Abdominal:      General: Bowel sounds are normal.      Palpations: Abdomen is soft.      Tenderness: There is no abdominal tenderness.   Musculoskeletal:         General: Normal range of motion.      Cervical back: Normal range of motion and neck supple.      Right lower leg: No edema.      Left lower leg: No edema.   Lymphadenopathy:      Cervical: No cervical adenopathy.   Skin:     General: Skin is warm and dry.   Neurological:      General: No focal deficit present.      Mental Status: She is alert and oriented to person, place, and time.   Psychiatric:         Mood and Affect: Mood normal.     Ear irrigation was done to clear wax from both ears.  Pt tolerated well.        Assessment and Plan:   ASSESSMENT and PLAN  Salma was seen today for 3 week follow up.    Diagnoses and all orders for this visit:    Essential (primary) hypertension  Chronic, stable     Type 2 diabetes mellitus with diabetic nephropathy, without long-term current use of insulin ([facility])  A1c stable at 6.7%.  -

## 2024-06-25 ENCOUNTER — TELEPHONE (OUTPATIENT)
Age: 89
End: 2024-06-25

## 2024-06-25 NOTE — TELEPHONE ENCOUNTER
Patient dropped off a Certificate of medical necessity form from [facility] to be filled out and fax back to [facility].  Form was placed in provider's box.

## 2024-06-28 RX ORDER — CHLORTHALIDONE 50 MG/1
50 TABLET ORAL DAILY
Qty: 90 TABLET | Refills: 3 | Status: SHIPPED | OUTPATIENT
Start: 2024-06-28

## 2024-06-28 NOTE — TELEPHONE ENCOUNTER
Last appointment: 6/3/24  Next appointment: 10/2/24  Previous refill encounter(s): 3/17/23    Requested Prescriptions     Pending Prescriptions Disp Refills    chlorthalidone (HYGROTEN) 50 MG tablet [Pharmacy Med Name: CHLORTHALIDONE  50MG  TAB] 90 tablet 3     Sig: TAKE 1 TABLET BY MOUTH DAILY         For Pharmacy Admin Tracking Only    Program: Medication Refill  CPA in place:    Recommendation Provided To:   Intervention Detail: New Rx: 1, reason: Patient Preference  Intervention Accepted By:   Gap Closed?:    Time Spent (min): 5

## 2024-07-19 ENCOUNTER — CLINICAL DOCUMENTATION (OUTPATIENT)
Age: 89
End: 2024-07-19

## 2024-07-19 NOTE — PROGRESS NOTES
Pt Certificate Of Medical Necessity form and office notes were faxed today to [facility] at [phone].

## 2024-08-20 DIAGNOSIS — I10 ESSENTIAL (PRIMARY) HYPERTENSION: ICD-10-CM

## 2024-08-20 RX ORDER — AMLODIPINE BESYLATE 10 MG/1
TABLET ORAL
Qty: 90 TABLET | Refills: 3 | Status: SHIPPED | OUTPATIENT
Start: 2024-08-20

## 2024-08-20 NOTE — TELEPHONE ENCOUNTER
Last appointment: 6/3/24  Next appointment: 10/2/24  Previous refill encounter(s): 10/4/23 #90 with 3 refills    Requested Prescriptions     Pending Prescriptions Disp Refills    amLODIPine (NORVASC) 10 MG tablet [Pharmacy Med Name: amLODIPine Besylate 10 MG Oral Tablet] 90 tablet 3     Sig: TAKE 1 TABLET BY MOUTH ONCE  DAILY         For Pharmacy Admin Tracking Only    Program: Medication Refill  CPA in place:    Recommendation Provided To:   Intervention Detail: New Rx: 1, reason: Patient Preference  Intervention Accepted By:   Gap Closed?:    Time Spent (min): 5

## 2024-08-21 RX ORDER — SPIRONOLACTONE 25 MG/1
50 TABLET ORAL DAILY
Qty: 180 TABLET | Refills: 3 | Status: SHIPPED | OUTPATIENT
Start: 2024-08-21

## 2024-08-21 NOTE — TELEPHONE ENCOUNTER
Rx pended with updated dose of 2 tabs/day as per 6/3/24.    Last appointment: 6/3/24  Next appointment: 10/2/24  Previous refill encounter(s): 10/4/23 #180 with 3 refills    Requested Prescriptions     Pending Prescriptions Disp Refills    spironolactone (ALDACTONE) 25 MG tablet [Pharmacy Med Name: Spironolactone 25 MG Oral Tablet] 180 tablet 3     Sig: Take 2 tablets by mouth daily         For Pharmacy Admin Tracking Only    Program: Medication Refill  CPA in place:    Recommendation Provided To:   Intervention Detail: New Rx: 1, reason: Patient Preference  Intervention Accepted By:   Gap Closed?:    Time Spent (min): 5

## 2024-08-28 ENCOUNTER — CLINICAL DOCUMENTATION (OUTPATIENT)
Age: 89
End: 2024-08-28

## 2024-09-24 ENCOUNTER — CLINICAL DOCUMENTATION (OUTPATIENT)
Age: 89
End: 2024-09-24

## 2024-10-02 ENCOUNTER — OFFICE VISIT (OUTPATIENT)
Age: 89
End: 2024-10-02
Payer: MEDICARE

## 2024-10-02 VITALS
BODY MASS INDEX: 26.39 KG/M2 | DIASTOLIC BLOOD PRESSURE: 61 MMHG | HEART RATE: 56 BPM | TEMPERATURE: 97.8 F | RESPIRATION RATE: 18 BRPM | WEIGHT: 139.8 LBS | HEIGHT: 61 IN | OXYGEN SATURATION: 97 % | SYSTOLIC BLOOD PRESSURE: 132 MMHG

## 2024-10-02 DIAGNOSIS — Z79.899 ENCOUNTER FOR LONG-TERM (CURRENT) USE OF MEDICATIONS: ICD-10-CM

## 2024-10-02 DIAGNOSIS — R61 NIGHT SWEATS: ICD-10-CM

## 2024-10-02 DIAGNOSIS — R61 EXCESSIVE SWEATING: ICD-10-CM

## 2024-10-02 DIAGNOSIS — Z85.79 HISTORY OF MULTIPLE MYELOMA: ICD-10-CM

## 2024-10-02 DIAGNOSIS — E78.2 MIXED HYPERLIPIDEMIA: ICD-10-CM

## 2024-10-02 DIAGNOSIS — I10 ESSENTIAL (PRIMARY) HYPERTENSION: ICD-10-CM

## 2024-10-02 DIAGNOSIS — E11.21 TYPE 2 DIABETES MELLITUS WITH DIABETIC NEPHROPATHY, WITHOUT LONG-TERM CURRENT USE OF INSULIN (HCC): ICD-10-CM

## 2024-10-02 DIAGNOSIS — D64.9 CHRONIC ANEMIA: ICD-10-CM

## 2024-10-02 DIAGNOSIS — R82.90 NONSPECIFIC FINDINGS ON EXAMINATION OF URINE: ICD-10-CM

## 2024-10-02 DIAGNOSIS — Z00.00 MEDICARE ANNUAL WELLNESS VISIT, SUBSEQUENT: Primary | ICD-10-CM

## 2024-10-02 DIAGNOSIS — N18.32 CHRONIC KIDNEY DISEASE, STAGE 3B (HCC): ICD-10-CM

## 2024-10-02 PROCEDURE — 99214 OFFICE O/P EST MOD 30 MIN: CPT | Performed by: FAMILY MEDICINE

## 2024-10-02 RX ORDER — AMMONIUM LACTATE 12 G/100G
LOTION TOPICAL PRN
COMMUNITY
Start: 2024-06-25

## 2024-10-02 SDOH — ECONOMIC STABILITY: FOOD INSECURITY: WITHIN THE PAST 12 MONTHS, THE FOOD YOU BOUGHT JUST DIDN'T LAST AND YOU DIDN'T HAVE MONEY TO GET MORE.: NEVER TRUE

## 2024-10-02 SDOH — HEALTH STABILITY: PHYSICAL HEALTH: ON AVERAGE, HOW MANY DAYS PER WEEK DO YOU ENGAGE IN MODERATE TO STRENUOUS EXERCISE (LIKE A BRISK WALK)?: 3 DAYS

## 2024-10-02 SDOH — ECONOMIC STABILITY: FOOD INSECURITY: WITHIN THE PAST 12 MONTHS, YOU WORRIED THAT YOUR FOOD WOULD RUN OUT BEFORE YOU GOT MONEY TO BUY MORE.: NEVER TRUE

## 2024-10-02 SDOH — ECONOMIC STABILITY: INCOME INSECURITY: HOW HARD IS IT FOR YOU TO PAY FOR THE VERY BASICS LIKE FOOD, HOUSING, MEDICAL CARE, AND HEATING?: NOT VERY HARD

## 2024-10-02 SDOH — HEALTH STABILITY: PHYSICAL HEALTH: ON AVERAGE, HOW MANY MINUTES DO YOU ENGAGE IN EXERCISE AT THIS LEVEL?: 20 MIN

## 2024-10-02 ASSESSMENT — ANXIETY QUESTIONNAIRES
5. BEING SO RESTLESS THAT IT IS HARD TO SIT STILL: NOT AT ALL
3. WORRYING TOO MUCH ABOUT DIFFERENT THINGS: NOT AT ALL
IF YOU CHECKED OFF ANY PROBLEMS ON THIS QUESTIONNAIRE, HOW DIFFICULT HAVE THESE PROBLEMS MADE IT FOR YOU TO DO YOUR WORK, TAKE CARE OF THINGS AT HOME, OR GET ALONG WITH OTHER PEOPLE: NOT DIFFICULT AT ALL
4. TROUBLE RELAXING: NOT AT ALL
2. NOT BEING ABLE TO STOP OR CONTROL WORRYING: NOT AT ALL
1. FEELING NERVOUS, ANXIOUS, OR ON EDGE: NOT AT ALL

## 2024-10-02 ASSESSMENT — LIFESTYLE VARIABLES
HOW OFTEN DO YOU HAVE SIX OR MORE DRINKS ON ONE OCCASION: 1
HOW OFTEN DO YOU HAVE A DRINK CONTAINING ALCOHOL: 1
HOW OFTEN DO YOU HAVE A DRINK CONTAINING ALCOHOL: NEVER
HOW MANY STANDARD DRINKS CONTAINING ALCOHOL DO YOU HAVE ON A TYPICAL DAY: 0
HOW MANY STANDARD DRINKS CONTAINING ALCOHOL DO YOU HAVE ON A TYPICAL DAY: PATIENT DOES NOT DRINK

## 2024-10-02 ASSESSMENT — ENCOUNTER SYMPTOMS
RHINORRHEA: 0
CONSTIPATION: 0
SHORTNESS OF BREATH: 0
CHEST TIGHTNESS: 0
COUGH: 0
ABDOMINAL PAIN: 0
BLOOD IN STOOL: 0

## 2024-10-02 ASSESSMENT — PATIENT HEALTH QUESTIONNAIRE - PHQ9
SUM OF ALL RESPONSES TO PHQ9 QUESTIONS 1 & 2: 0
SUM OF ALL RESPONSES TO PHQ QUESTIONS 1-9: 0
2. FEELING DOWN, DEPRESSED OR HOPELESS: NOT AT ALL
SUM OF ALL RESPONSES TO PHQ QUESTIONS 1-9: 0
1. LITTLE INTEREST OR PLEASURE IN DOING THINGS: NOT AT ALL

## 2024-10-02 NOTE — PROGRESS NOTES
Chief Complaint   Patient presents with    Medicare AWV         Here for annual medicare wellness.        \"Have you been to the ER, urgent care clinic since your last visit?  Hospitalized since your last visit?\"    NO    “Have you seen or consulted any other health care providers outside of [facility] since your last visit?”    NO            Click Here for Release of Records Request    
Medicare Annual Wellness Visit    Salma Feng is here for Medicare AWV    Assessment & Plan   Medicare annual wellness visit, subsequent  Essential (primary) hypertension  Type 2 diabetes mellitus with diabetic nephropathy, without long-term current use of insulin (HCC)  -     Hemoglobin A1C; Future  Mixed hyperlipidemia  Chronic kidney disease, stage 3b (HCC)  Chronic anemia  Night sweats  -     TSH; Future  -     Urinalysis with Microscopic; Future  History of multiple myeloma  Encounter for long-term (current) use of medications  -     CBC; Future  -     Basic Metabolic Panel; Future  Nonspecific findings on examination of urine  -     Culture, Urine; Future    Recommendations for Preventive Services Due: see orders and patient instructions/AVS.  Recommended screening schedule for the next 5-10 years is provided to the patient in written form: see Patient Instructions/AVS.     Return in about 2 months (around 12/16/2024).     Subjective   Patient's complete Health Risk Assessment and screening values have been reviewed and are found in Flowsheets. The following problems were reviewed today and where indicated follow up appointments were made and/or referrals ordered.    Positive Risk Factor Screenings with Interventions:       Cognitive:   Clock Drawing Test (CDT): Normal  Words recalled: 3 Words Recalled  Total Score: 5  Total Score Interpretation: Abnormal Mini-Cog  Interventions:  See AVS for additional education material                    Advanced Directives:  Do you have a Living Will?: (!) No    Intervention:  has NO advanced directive - information provided               Objective   Vitals:    10/02/24 1137   BP: 132/61   Pulse: 56   Resp: 18   Temp: 97.8 °F (36.6 °C)   SpO2: 97%   Weight: 63.4 kg (139 lb 12.8 oz)   Height: 1.549 m (5' 1\")      Body mass index is 26.41 kg/m².                 Allergies   Allergen Reactions    Cefuroxime Axetil Diarrhea    Penicillin G Other (See Comments)    Penicillins 
options, including safe use of medications and possible medication side effects.   Through the use of shared decision making we have agreed to the above plan. The patient has received an after-visit summary and questions were answered concerning future plans and follow up.  Advise pt of any urgent changes then to proceed to the ER.            Angelic Jett MD

## 2024-10-03 ENCOUNTER — TELEPHONE (OUTPATIENT)
Age: 89
End: 2024-10-03

## 2024-10-03 LAB
BACTERIA SPEC CULT: NORMAL
CC UR VC: NORMAL
SERVICE CMNT-IMP: NORMAL

## 2024-10-05 LAB
ANION GAP SERPL CALC-SCNC: 5 MMOL/L (ref 2–12)
APPEARANCE UR: CLEAR
BACTERIA URNS QL MICRO: ABNORMAL /HPF
BILIRUB UR QL: NEGATIVE
BUN SERPL-MCNC: 31 MG/DL (ref 6–20)
BUN/CREAT SERPL: 17 (ref 12–20)
CALCIUM SERPL-MCNC: 9.4 MG/DL (ref 8.5–10.1)
CHLORIDE SERPL-SCNC: 107 MMOL/L (ref 97–108)
CO2 SERPL-SCNC: 29 MMOL/L (ref 21–32)
COLOR UR: ABNORMAL
CREAT SERPL-MCNC: 1.84 MG/DL (ref 0.55–1.02)
EPITH CASTS URNS QL MICRO: ABNORMAL /LPF
ERYTHROCYTE [DISTWIDTH] IN BLOOD BY AUTOMATED COUNT: 16.8 % (ref 11.5–14.5)
EST. AVERAGE GLUCOSE BLD GHB EST-MCNC: 143 MG/DL
GLUCOSE SERPL-MCNC: 124 MG/DL (ref 65–100)
GLUCOSE UR STRIP.AUTO-MCNC: NEGATIVE MG/DL
HBA1C MFR BLD: 6.6 % (ref 4–5.6)
HCT VFR BLD AUTO: 31.4 % (ref 35–47)
HGB BLD-MCNC: 9.8 G/DL (ref 11.5–16)
HGB UR QL STRIP: NEGATIVE
KETONES UR QL STRIP.AUTO: NEGATIVE MG/DL
LEUKOCYTE ESTERASE UR QL STRIP.AUTO: NEGATIVE
MCH RBC QN AUTO: 28.2 PG (ref 26–34)
MCHC RBC AUTO-ENTMCNC: 31.2 G/DL (ref 30–36.5)
MCV RBC AUTO: 90.5 FL (ref 80–99)
NITRITE UR QL STRIP.AUTO: NEGATIVE
NRBC # BLD: 0 K/UL (ref 0–0.01)
NRBC BLD-RTO: 0 PER 100 WBC
PH UR STRIP: 7 (ref 5–8)
PLATELET # BLD AUTO: 139 K/UL (ref 150–400)
PMV BLD AUTO: 10.7 FL (ref 8.9–12.9)
POTASSIUM SERPL-SCNC: 3.5 MMOL/L (ref 3.5–5.1)
PROT UR STRIP-MCNC: 30 MG/DL
RBC # BLD AUTO: 3.47 M/UL (ref 3.8–5.2)
RBC #/AREA URNS HPF: ABNORMAL /HPF (ref 0–5)
SODIUM SERPL-SCNC: 141 MMOL/L (ref 136–145)
SP GR UR REFRACTOMETRY: 1.01 (ref 1–1.03)
TSH SERPL DL<=0.05 MIU/L-ACNC: 2.71 UIU/ML (ref 0.36–3.74)
UROBILINOGEN UR QL STRIP.AUTO: 0.2 EU/DL (ref 0.2–1)
WBC # BLD AUTO: 3.9 K/UL (ref 3.6–11)
WBC URNS QL MICRO: ABNORMAL /HPF (ref 0–4)
YEAST URNS QL MICRO: PRESENT

## 2024-10-09 ENCOUNTER — IMMUNIZATION (OUTPATIENT)
Age: 89
End: 2024-10-09
Payer: MEDICARE

## 2024-10-09 VITALS
RESPIRATION RATE: 18 BRPM | TEMPERATURE: 97.3 F | DIASTOLIC BLOOD PRESSURE: 64 MMHG | HEART RATE: 67 BPM | OXYGEN SATURATION: 98 % | BODY MASS INDEX: 26.24 KG/M2 | HEIGHT: 61 IN | SYSTOLIC BLOOD PRESSURE: 155 MMHG | WEIGHT: 139 LBS

## 2024-10-09 PROCEDURE — 90653 IIV ADJUVANT VACCINE IM: CPT | Performed by: FAMILY MEDICINE

## 2024-10-09 PROCEDURE — PBSHW COVID-19, COMIRNATY, (AGE 12Y+), IM, PF, 30MCG/0.3ML: Performed by: FAMILY MEDICINE

## 2024-10-09 PROCEDURE — PBSHW INFLUENZA, FLUAD TRIVALENT, (AGE 65 Y+), IM, PRESERVATIVE FREE, 0.5ML: Performed by: FAMILY MEDICINE

## 2024-10-09 PROCEDURE — 91320 SARSCV2 VAC 30MCG TRS-SUC IM: CPT | Performed by: FAMILY MEDICINE

## 2024-10-09 NOTE — PROGRESS NOTES
Chief Complaint   Patient presents with    Immunizations     Patient is here today for her flu and covid vaccines.      \"Have you been to the ER, urgent care clinic since your last visit?  Hospitalized since your last visit?\"    NO    “Have you seen or consulted any other health care providers outside of [facility] since your last visit?”    NO            Click Here for Release of Records Request      After obtaining consent, and per orders of Dr. Jett, injection of high dose influenza given in right deltoid and covid given in Left deltoid by Chandni Danielson. Patient instructed to remain in clinic for 20 minutes afterwards, and to report any adverse reaction to me immediately.

## 2024-10-22 ENCOUNTER — TELEPHONE (OUTPATIENT)
Age: 89
End: 2024-10-22

## 2024-10-22 NOTE — TELEPHONE ENCOUNTER
Spoke to the pt and per Cynthia Haney LPN she has to contact her insurance.  Per the pt she will call them.

## 2024-10-22 NOTE — TELEPHONE ENCOUNTER
Patient would like a call back to discuss getting a  for transportation. Patient can be reached at [phone].

## 2024-11-14 RX ORDER — ROSUVASTATIN CALCIUM 5 MG/1
TABLET, COATED ORAL
Qty: 90 TABLET | Refills: 3 | Status: SHIPPED | OUTPATIENT
Start: 2024-11-14

## 2024-11-14 NOTE — TELEPHONE ENCOUNTER
Last appointment: 10/2/24  Next appointment: 12/4/24  Previous refill encounter(s): 12/6/23    Requested Prescriptions     Pending Prescriptions Disp Refills    rosuvastatin (CRESTOR) 5 MG tablet [Pharmacy Med Name: Rosuvastatin Calcium 5 MG Oral Tablet] 90 tablet 3     Sig: TAKE 1 TABLET BY MOUTH AT NIGHT         For Pharmacy Admin Tracking Only    Program: Medication Refill  CPA in place:    Recommendation Provided To:   Intervention Detail: New Rx: 1, reason: Patient Preference  Intervention Accepted By:   Gap Closed?:    Time Spent (min): 5

## 2024-11-25 DIAGNOSIS — R00.1 SINUS BRADYCARDIA: ICD-10-CM

## 2024-11-26 RX ORDER — METOPROLOL TARTRATE 100 MG/1
TABLET ORAL
Qty: 90 TABLET | Refills: 3 | Status: SHIPPED | OUTPATIENT
Start: 2024-11-26

## 2024-11-26 NOTE — TELEPHONE ENCOUNTER
Last appointment: 10/2/24  Next appointment: 12/4/24  Previous refill encounter(s): 1/3/24 #90 with 3 refills    Requested Prescriptions     Pending Prescriptions Disp Refills    metoprolol (LOPRESSOR) 100 MG tablet [Pharmacy Med Name: Metoprolol Tartrate 100 MG Oral Tablet] 90 tablet 3     Sig: TAKE ONE-HALF TABLET BY MOUTH  TWICE DAILY         For Pharmacy Admin Tracking Only    Program: Medication Refill  CPA in place:    Recommendation Provided To:   Intervention Detail: New Rx: 1, reason: Patient Preference  Intervention Accepted By:   Gap Closed?:    Time Spent (min): 5

## 2024-12-04 ENCOUNTER — OFFICE VISIT (OUTPATIENT)
Age: 88
End: 2024-12-04
Payer: MEDICARE

## 2024-12-04 VITALS
BODY MASS INDEX: 26.39 KG/M2 | RESPIRATION RATE: 19 BRPM | WEIGHT: 139.8 LBS | HEART RATE: 57 BPM | TEMPERATURE: 97.9 F | SYSTOLIC BLOOD PRESSURE: 130 MMHG | OXYGEN SATURATION: 97 % | HEIGHT: 61 IN | DIASTOLIC BLOOD PRESSURE: 68 MMHG

## 2024-12-04 DIAGNOSIS — Z79.899 ENCOUNTER FOR LONG-TERM (CURRENT) USE OF MEDICATIONS: ICD-10-CM

## 2024-12-04 DIAGNOSIS — N18.32 CHRONIC KIDNEY DISEASE, STAGE 3B (HCC): ICD-10-CM

## 2024-12-04 DIAGNOSIS — M79.672 LEFT FOOT PAIN: ICD-10-CM

## 2024-12-04 DIAGNOSIS — I10 ESSENTIAL (PRIMARY) HYPERTENSION: Primary | ICD-10-CM

## 2024-12-04 DIAGNOSIS — E11.21 TYPE 2 DIABETES MELLITUS WITH DIABETIC NEPHROPATHY, WITHOUT LONG-TERM CURRENT USE OF INSULIN (HCC): ICD-10-CM

## 2024-12-04 DIAGNOSIS — D64.9 CHRONIC ANEMIA: ICD-10-CM

## 2024-12-04 DIAGNOSIS — S90.01XS CONTUSION OF RIGHT ANKLE, SEQUELA: ICD-10-CM

## 2024-12-04 DIAGNOSIS — E78.2 MIXED HYPERLIPIDEMIA: ICD-10-CM

## 2024-12-04 DIAGNOSIS — Z87.39 HISTORY OF GOUT: ICD-10-CM

## 2024-12-04 DIAGNOSIS — Z85.79 HISTORY OF MULTIPLE MYELOMA: ICD-10-CM

## 2024-12-04 PROCEDURE — 99214 OFFICE O/P EST MOD 30 MIN: CPT | Performed by: FAMILY MEDICINE

## 2024-12-04 RX ORDER — PREDNISONE 10 MG/1
10 TABLET ORAL 2 TIMES DAILY
Qty: 10 TABLET | Refills: 0 | Status: SHIPPED | OUTPATIENT
Start: 2024-12-04 | End: 2024-12-09

## 2024-12-04 RX ORDER — LATANOPROST 50 UG/ML
1 SOLUTION/ DROPS OPHTHALMIC NIGHTLY
COMMUNITY
Start: 2024-11-16

## 2024-12-04 SDOH — ECONOMIC STABILITY: INCOME INSECURITY: HOW HARD IS IT FOR YOU TO PAY FOR THE VERY BASICS LIKE FOOD, HOUSING, MEDICAL CARE, AND HEATING?: NOT VERY HARD

## 2024-12-04 SDOH — ECONOMIC STABILITY: FOOD INSECURITY: WITHIN THE PAST 12 MONTHS, THE FOOD YOU BOUGHT JUST DIDN'T LAST AND YOU DIDN'T HAVE MONEY TO GET MORE.: NEVER TRUE

## 2024-12-04 SDOH — ECONOMIC STABILITY: FOOD INSECURITY: WITHIN THE PAST 12 MONTHS, YOU WORRIED THAT YOUR FOOD WOULD RUN OUT BEFORE YOU GOT MONEY TO BUY MORE.: NEVER TRUE

## 2024-12-04 ASSESSMENT — ENCOUNTER SYMPTOMS
COUGH: 0
CONSTIPATION: 0
RHINORRHEA: 0
BLOOD IN STOOL: 0
ABDOMINAL PAIN: 0
CHEST TIGHTNESS: 0
SHORTNESS OF BREATH: 0

## 2024-12-04 ASSESSMENT — ANXIETY QUESTIONNAIRES
1. FEELING NERVOUS, ANXIOUS, OR ON EDGE: NOT AT ALL
IF YOU CHECKED OFF ANY PROBLEMS ON THIS QUESTIONNAIRE, HOW DIFFICULT HAVE THESE PROBLEMS MADE IT FOR YOU TO DO YOUR WORK, TAKE CARE OF THINGS AT HOME, OR GET ALONG WITH OTHER PEOPLE: NOT DIFFICULT AT ALL
7. FEELING AFRAID AS IF SOMETHING AWFUL MIGHT HAPPEN: NOT AT ALL
5. BEING SO RESTLESS THAT IT IS HARD TO SIT STILL: NOT AT ALL
6. BECOMING EASILY ANNOYED OR IRRITABLE: NOT AT ALL
4. TROUBLE RELAXING: NOT AT ALL
2. NOT BEING ABLE TO STOP OR CONTROL WORRYING: NOT AT ALL
GAD7 TOTAL SCORE: 0
3. WORRYING TOO MUCH ABOUT DIFFERENT THINGS: NOT AT ALL
GAD7 TOTAL SCORE: 0

## 2024-12-04 ASSESSMENT — PATIENT HEALTH QUESTIONNAIRE - PHQ9
2. FEELING DOWN, DEPRESSED OR HOPELESS: NOT AT ALL
SUM OF ALL RESPONSES TO PHQ QUESTIONS 1-9: 0
SUM OF ALL RESPONSES TO PHQ QUESTIONS 1-9: 0
1. LITTLE INTEREST OR PLEASURE IN DOING THINGS: NOT AT ALL
SUM OF ALL RESPONSES TO PHQ QUESTIONS 1-9: 0
SUM OF ALL RESPONSES TO PHQ9 QUESTIONS 1 & 2: 0
SUM OF ALL RESPONSES TO PHQ QUESTIONS 1-9: 0

## 2024-12-04 NOTE — PROGRESS NOTES
Chief Complaint   Patient presents with    2 MONTH FOLLOW UP         Here for 2 month follow up.        \"Have you been to the ER, urgent care clinic since your last visit?  Hospitalized since your last visit?\"    NO    “Have you seen or consulted any other health care providers outside of [facility] since your last visit?”    NO            Click Here for Release of Records Request    
Final       Review of Systems   Constitutional:  Negative for activity change.   HENT:  Negative for congestion and rhinorrhea.    Respiratory:  Negative for cough, chest tightness and shortness of breath.    Cardiovascular:  Negative for chest pain, palpitations and leg swelling.   Gastrointestinal:  Negative for abdominal pain, blood in stool and constipation.   Endocrine: Negative for polyuria.   Genitourinary:  Negative for difficulty urinating, frequency, hematuria and urgency.   Musculoskeletal:  Positive for arthralgias (has various joint pain that comes and goes.). Negative for joint swelling and myalgias.   Skin:  Negative for rash.   Allergic/Immunologic: Negative for environmental allergies.   Neurological:  Negative for dizziness, light-headedness and headaches.   Psychiatric/Behavioral:  Negative for dysphoric mood and sleep disturbance. The patient is not nervous/anxious.        Objective:   Physical Exam  Constitutional:       Appearance: Normal appearance.      Comments: /61   Pulse 56   Temp 97.8 °F (36.6 °C)   Resp 18   Ht 1.549 m (5' 1\")   Wt 63.4 kg (139 lb 12.8 oz)   SpO2 97%   BMI 26.41 kg/m²    HENT:      Right Ear: Tympanic membrane normal.      Left Ear: Tympanic membrane normal.      Nose: No rhinorrhea.      Mouth/Throat:      Mouth: Mucous membranes are moist.   Cardiovascular:      Rate and Rhythm: Normal rate and regular rhythm.   Pulmonary:      Effort: Pulmonary effort is normal.      Breath sounds: Normal breath sounds.   Abdominal:      General: Bowel sounds are normal.      Palpations: Abdomen is soft.      Tenderness: There is no abdominal tenderness.   Musculoskeletal:         General: Normal range of motion.      Cervical back: Normal range of motion and neck supple.      Right lower leg: Edema (trace) present.      Left lower leg: Edema (trace) present.      Right ankle: No swelling. No tenderness. Normal range of motion.      Left foot: Bunion (tenderness in the

## 2024-12-05 LAB — URATE SERPL-MCNC: 4.5 MG/DL (ref 2.6–6)

## 2024-12-10 ENCOUNTER — TRANSCRIBE ORDERS (OUTPATIENT)
Dept: ADMINISTRATIVE | Age: 88
End: 2024-12-10

## 2024-12-10 DIAGNOSIS — C25.1 MALIGNANT NEOPLASM OF BODY OF PANCREAS (HCC): ICD-10-CM

## 2024-12-10 DIAGNOSIS — C90.00 MULTIPLE MYELOMA NOT HAVING ACHIEVED REMISSION (HCC): Primary | ICD-10-CM

## 2024-12-12 ENCOUNTER — HOSPITAL ENCOUNTER (OUTPATIENT)
Facility: HOSPITAL | Age: 88
Discharge: HOME | End: 2024-12-15
Attending: INTERNAL MEDICINE
Payer: MEDICARE

## 2024-12-12 DIAGNOSIS — C90.00 MULTIPLE MYELOMA NOT HAVING ACHIEVED REMISSION (HCC): ICD-10-CM

## 2024-12-12 DIAGNOSIS — C25.1 MALIGNANT NEOPLASM OF BODY OF PANCREAS (HCC): ICD-10-CM

## 2024-12-12 PROCEDURE — 74176 CT ABD & PELVIS W/O CONTRAST: CPT

## 2024-12-20 ENCOUNTER — OFFICE VISIT (OUTPATIENT)
Age: 88
End: 2024-12-20
Payer: MEDICARE

## 2024-12-20 VITALS
BODY MASS INDEX: 25.9 KG/M2 | SYSTOLIC BLOOD PRESSURE: 134 MMHG | DIASTOLIC BLOOD PRESSURE: 58 MMHG | WEIGHT: 137.2 LBS | RESPIRATION RATE: 18 BRPM | TEMPERATURE: 98.3 F | HEIGHT: 61 IN | HEART RATE: 56 BPM

## 2024-12-20 DIAGNOSIS — M79.672 LEFT FOOT PAIN: Primary | ICD-10-CM

## 2024-12-20 DIAGNOSIS — Z87.39 HISTORY OF GOUT: ICD-10-CM

## 2024-12-20 PROCEDURE — 1126F AMNT PAIN NOTED NONE PRSNT: CPT | Performed by: FAMILY MEDICINE

## 2024-12-20 PROCEDURE — 1160F RVW MEDS BY RX/DR IN RCRD: CPT | Performed by: FAMILY MEDICINE

## 2024-12-20 PROCEDURE — 1036F TOBACCO NON-USER: CPT | Performed by: FAMILY MEDICINE

## 2024-12-20 PROCEDURE — G8419 CALC BMI OUT NRM PARAM NOF/U: HCPCS | Performed by: FAMILY MEDICINE

## 2024-12-20 PROCEDURE — G8427 DOCREV CUR MEDS BY ELIG CLIN: HCPCS | Performed by: FAMILY MEDICINE

## 2024-12-20 PROCEDURE — 99212 OFFICE O/P EST SF 10 MIN: CPT | Performed by: FAMILY MEDICINE

## 2024-12-20 PROCEDURE — 1123F ACP DISCUSS/DSCN MKR DOCD: CPT | Performed by: FAMILY MEDICINE

## 2024-12-20 PROCEDURE — 1159F MED LIST DOCD IN RCRD: CPT | Performed by: FAMILY MEDICINE

## 2024-12-20 PROCEDURE — G8482 FLU IMMUNIZE ORDER/ADMIN: HCPCS | Performed by: FAMILY MEDICINE

## 2024-12-20 PROCEDURE — 1090F PRES/ABSN URINE INCON ASSESS: CPT | Performed by: FAMILY MEDICINE

## 2024-12-20 SDOH — ECONOMIC STABILITY: FOOD INSECURITY: WITHIN THE PAST 12 MONTHS, YOU WORRIED THAT YOUR FOOD WOULD RUN OUT BEFORE YOU GOT MONEY TO BUY MORE.: NEVER TRUE

## 2024-12-20 SDOH — ECONOMIC STABILITY: INCOME INSECURITY: HOW HARD IS IT FOR YOU TO PAY FOR THE VERY BASICS LIKE FOOD, HOUSING, MEDICAL CARE, AND HEATING?: NOT VERY HARD

## 2024-12-20 SDOH — ECONOMIC STABILITY: FOOD INSECURITY: WITHIN THE PAST 12 MONTHS, THE FOOD YOU BOUGHT JUST DIDN'T LAST AND YOU DIDN'T HAVE MONEY TO GET MORE.: NEVER TRUE

## 2024-12-20 ASSESSMENT — ANXIETY QUESTIONNAIRES
4. TROUBLE RELAXING: NOT AT ALL
7. FEELING AFRAID AS IF SOMETHING AWFUL MIGHT HAPPEN: NOT AT ALL
5. BEING SO RESTLESS THAT IT IS HARD TO SIT STILL: NOT AT ALL
6. BECOMING EASILY ANNOYED OR IRRITABLE: NOT AT ALL
3. WORRYING TOO MUCH ABOUT DIFFERENT THINGS: NOT AT ALL
IF YOU CHECKED OFF ANY PROBLEMS ON THIS QUESTIONNAIRE, HOW DIFFICULT HAVE THESE PROBLEMS MADE IT FOR YOU TO DO YOUR WORK, TAKE CARE OF THINGS AT HOME, OR GET ALONG WITH OTHER PEOPLE: NOT DIFFICULT AT ALL
GAD7 TOTAL SCORE: 0
1. FEELING NERVOUS, ANXIOUS, OR ON EDGE: NOT AT ALL
2. NOT BEING ABLE TO STOP OR CONTROL WORRYING: NOT AT ALL
GAD7 TOTAL SCORE: 0

## 2024-12-20 ASSESSMENT — PATIENT HEALTH QUESTIONNAIRE - PHQ9
SUM OF ALL RESPONSES TO PHQ QUESTIONS 1-9: 0
2. FEELING DOWN, DEPRESSED OR HOPELESS: NOT AT ALL
SUM OF ALL RESPONSES TO PHQ9 QUESTIONS 1 & 2: 0
1. LITTLE INTEREST OR PLEASURE IN DOING THINGS: NOT AT ALL

## 2024-12-20 ASSESSMENT — ENCOUNTER SYMPTOMS
CONSTIPATION: 0
RHINORRHEA: 0
CHEST TIGHTNESS: 0
ABDOMINAL PAIN: 0
COUGH: 0
BLOOD IN STOOL: 0
SHORTNESS OF BREATH: 0

## 2024-12-20 NOTE — PROGRESS NOTES
Chief Complaint   Patient presents with    2 Week Follow-Up         Here for routine 2 week follow up.        \"Have you been to the ER, urgent care clinic since your last visit?  Hospitalized since your last visit?\"    NO    “Have you seen or consulted any other health care providers outside of [facility] since your last visit?”    NO            Click Here for Release of Records Request    
MOUTH AT NIGHT 90 tablet 3    ammonium lactate (LAC-HYDRIN) 12 % lotion Apply topically as needed for Dry Skin      spironolactone (ALDACTONE) 25 MG tablet Take 2 tablets by mouth daily 180 tablet 3    amLODIPine (NORVASC) 10 MG tablet TAKE 1 TABLET BY MOUTH ONCE  DAILY 90 tablet 3    chlorthalidone (HYGROTEN) 50 MG tablet TAKE 1 TABLET BY MOUTH DAILY 90 tablet 3    JANUVIA 25 MG tablet TAKE 1 TABLET BY MOUTH DAILY FOR TYPE 2 DIABETES MELLITUS 90 tablet 3    minoxidil (LONITEN) 10 MG tablet TAKE ONE-HALF TABLET BY MOUTH  DAILY 45 tablet 3    cloNIDine (CATAPRES) 0.3 MG tablet TAKE 1 TABLET BY MOUTH AT NIGHT 90 tablet 3    allopurinol (ZYLOPRIM) 100 MG tablet TAKE 1 TABLET BY MOUTH DAILY 90 tablet 3    fluticasone (VERAMYST) 27.5 MCG/SPRAY nasal spray 2 sprays by Each Nostril route as needed for Allergies or Rhinitis      epoetin christina (EPOGEN;PROCRIT) 04141 UNIT/ML injection 0.1 mLs as needed      Omega-3 Fatty Acids (OMEGA-3 EPA FISH OIL PO) Take 1 tablet by mouth daily (Patient not taking: Reported on 12/4/2024)      ascorbic acid (VITAMIN C) 500 MG tablet Take by mouth daily      mupirocin (BACTROBAN) 2 % cream Apply topically 2 times daily as needed      polyethylene glycol (GLYCOLAX) 17 GM/SCOOP powder Take by mouth daily       No current facility-administered medications for this visit.      Family History   Problem Relation Age of Onset    Hypertension Mother     No Known Problems Father     Hypertension Sister     Elevated Lipids Sister     Kidney Disease Sister         ESRD on HD    Diabetes Sister     Colon Cancer Sister       Social History     Socioeconomic History    Marital status:    Tobacco Use    Smoking status: Former     Types: Cigarettes    Smokeless tobacco: Never   Vaping Use    Vaping status: Never Used   Substance and Sexual Activity    Alcohol use: No     Alcohol/week: 0.0 standard drinks of alcohol    Drug use: No    Sexual activity: Not Currently   Social History Narrative    Lives

## 2025-01-08 ENCOUNTER — CLINICAL DOCUMENTATION (OUTPATIENT)
Age: 89
End: 2025-01-08

## 2025-01-10 ENCOUNTER — TRANSCRIBE ORDERS (OUTPATIENT)
Facility: HOSPITAL | Age: 89
End: 2025-01-10

## 2025-01-10 DIAGNOSIS — C25.1 MALIGNANT NEOPLASM OF BODY OF PANCREAS (HCC): ICD-10-CM

## 2025-01-10 DIAGNOSIS — D50.9 IRON DEFICIENCY ANEMIA, UNSPECIFIED IRON DEFICIENCY ANEMIA TYPE: ICD-10-CM

## 2025-01-10 DIAGNOSIS — E87.5 HYPERKALEMIA: ICD-10-CM

## 2025-01-10 DIAGNOSIS — R91.8 OTHER NONSPECIFIC ABNORMAL FINDING OF LUNG FIELD: ICD-10-CM

## 2025-01-10 DIAGNOSIS — C90.00 MULTIPLE MYELOMA, REMISSION STATUS UNSPECIFIED (HCC): Primary | ICD-10-CM

## 2025-03-05 RX ORDER — ALLOPURINOL 100 MG/1
TABLET ORAL
Qty: 90 TABLET | Refills: 3 | Status: SHIPPED | OUTPATIENT
Start: 2025-03-05

## 2025-03-05 NOTE — TELEPHONE ENCOUNTER
Pharmacy is requesting a 90 d/s with refills enough to last a year.    Last appointment: 12/20/24  Next appointment: 3/21/25  Previous refill encounter(s): 2/23/24    Requested Prescriptions     Pending Prescriptions Disp Refills    allopurinol (ZYLOPRIM) 100 MG tablet [Pharmacy Med Name: Allopurinol 100 MG Oral Tablet] 90 tablet 3     Sig: TAKE 1 TABLET BY MOUTH DAILY         For Pharmacy Admin Tracking Only    Program: Medication Refill  CPA in place:    Recommendation Provided To:   Intervention Detail: New Rx: 1, reason: Patient Preference  Intervention Accepted By:   Gap Closed?:    Time Spent (min): 5

## 2025-03-13 ENCOUNTER — CLINICAL DOCUMENTATION (OUTPATIENT)
Age: 89
End: 2025-03-13

## 2025-03-21 ENCOUNTER — OFFICE VISIT (OUTPATIENT)
Age: 89
End: 2025-03-21
Payer: MEDICARE

## 2025-03-21 VITALS
HEART RATE: 57 BPM | WEIGHT: 137.8 LBS | DIASTOLIC BLOOD PRESSURE: 60 MMHG | RESPIRATION RATE: 18 BRPM | SYSTOLIC BLOOD PRESSURE: 150 MMHG | OXYGEN SATURATION: 96 % | BODY MASS INDEX: 26.04 KG/M2 | TEMPERATURE: 98 F

## 2025-03-21 DIAGNOSIS — N18.32 CHRONIC KIDNEY DISEASE, STAGE 3B (HCC): ICD-10-CM

## 2025-03-21 DIAGNOSIS — Z87.39 HISTORY OF GOUT: ICD-10-CM

## 2025-03-21 DIAGNOSIS — G47.33 OSA (OBSTRUCTIVE SLEEP APNEA): ICD-10-CM

## 2025-03-21 DIAGNOSIS — D64.9 CHRONIC ANEMIA: ICD-10-CM

## 2025-03-21 DIAGNOSIS — Z79.899 ENCOUNTER FOR LONG-TERM (CURRENT) USE OF MEDICATIONS: ICD-10-CM

## 2025-03-21 DIAGNOSIS — I10 ESSENTIAL (PRIMARY) HYPERTENSION: Primary | ICD-10-CM

## 2025-03-21 DIAGNOSIS — E78.2 MIXED HYPERLIPIDEMIA: ICD-10-CM

## 2025-03-21 DIAGNOSIS — Z85.79 HISTORY OF MULTIPLE MYELOMA: ICD-10-CM

## 2025-03-21 DIAGNOSIS — E11.21 TYPE 2 DIABETES MELLITUS WITH DIABETIC NEPHROPATHY, WITHOUT LONG-TERM CURRENT USE OF INSULIN (HCC): ICD-10-CM

## 2025-03-21 LAB
ALBUMIN SERPL-MCNC: 3.6 G/DL (ref 3.5–5)
ALBUMIN/GLOB SERPL: 0.6 (ref 1.1–2.2)
ALP SERPL-CCNC: 65 U/L (ref 45–117)
ALT SERPL-CCNC: 21 U/L (ref 12–78)
ANION GAP SERPL CALC-SCNC: 4 MMOL/L (ref 2–12)
AST SERPL-CCNC: 18 U/L (ref 15–37)
BILIRUB SERPL-MCNC: 0.5 MG/DL (ref 0.2–1)
BUN SERPL-MCNC: 29 MG/DL (ref 6–20)
BUN/CREAT SERPL: 15 (ref 12–20)
CALCIUM SERPL-MCNC: 9.3 MG/DL (ref 8.5–10.1)
CHLORIDE SERPL-SCNC: 108 MMOL/L (ref 97–108)
CHOLEST SERPL-MCNC: 147 MG/DL
CO2 SERPL-SCNC: 25 MMOL/L (ref 21–32)
CREAT SERPL-MCNC: 1.89 MG/DL (ref 0.55–1.02)
ERYTHROCYTE [DISTWIDTH] IN BLOOD BY AUTOMATED COUNT: 15.1 % (ref 11.5–14.5)
GLOBULIN SER CALC-MCNC: 5.8 G/DL (ref 2–4)
GLUCOSE SERPL-MCNC: 146 MG/DL (ref 65–100)
GLUCOSE, POC: 213 MG/DL
HBA1C MFR BLD: 6.5 %
HCT VFR BLD AUTO: 31.9 % (ref 35–47)
HDLC SERPL-MCNC: 50 MG/DL
HDLC SERPL: 2.9 (ref 0–5)
HGB BLD-MCNC: 10.5 G/DL (ref 11.5–16)
LDLC SERPL CALC-MCNC: 75 MG/DL (ref 0–100)
MCH RBC QN AUTO: 29.4 PG (ref 26–34)
MCHC RBC AUTO-ENTMCNC: 32.9 G/DL (ref 30–36.5)
MCV RBC AUTO: 89.4 FL (ref 80–99)
NRBC # BLD: 0 K/UL (ref 0–0.01)
NRBC BLD-RTO: 0 PER 100 WBC
PLATELET # BLD AUTO: 116 K/UL (ref 150–400)
PMV BLD AUTO: 11.3 FL (ref 8.9–12.9)
POTASSIUM SERPL-SCNC: 3.8 MMOL/L (ref 3.5–5.1)
PROT SERPL-MCNC: 9.4 G/DL (ref 6.4–8.2)
RBC # BLD AUTO: 3.57 M/UL (ref 3.8–5.2)
SODIUM SERPL-SCNC: 137 MMOL/L (ref 136–145)
TRIGL SERPL-MCNC: 110 MG/DL
VLDLC SERPL CALC-MCNC: 22 MG/DL
WBC # BLD AUTO: 5.4 K/UL (ref 3.6–11)

## 2025-03-21 PROCEDURE — 83036 HEMOGLOBIN GLYCOSYLATED A1C: CPT | Performed by: FAMILY MEDICINE

## 2025-03-21 PROCEDURE — 82962 GLUCOSE BLOOD TEST: CPT | Performed by: FAMILY MEDICINE

## 2025-03-21 PROCEDURE — 99214 OFFICE O/P EST MOD 30 MIN: CPT | Performed by: FAMILY MEDICINE

## 2025-03-21 SDOH — ECONOMIC STABILITY: FOOD INSECURITY: WITHIN THE PAST 12 MONTHS, THE FOOD YOU BOUGHT JUST DIDN'T LAST AND YOU DIDN'T HAVE MONEY TO GET MORE.: NEVER TRUE

## 2025-03-21 SDOH — ECONOMIC STABILITY: FOOD INSECURITY: WITHIN THE PAST 12 MONTHS, YOU WORRIED THAT YOUR FOOD WOULD RUN OUT BEFORE YOU GOT MONEY TO BUY MORE.: NEVER TRUE

## 2025-03-21 ASSESSMENT — PATIENT HEALTH QUESTIONNAIRE - PHQ9
SUM OF ALL RESPONSES TO PHQ QUESTIONS 1-9: 0
SUM OF ALL RESPONSES TO PHQ QUESTIONS 1-9: 0
2. FEELING DOWN, DEPRESSED OR HOPELESS: NOT AT ALL
SUM OF ALL RESPONSES TO PHQ QUESTIONS 1-9: 0
SUM OF ALL RESPONSES TO PHQ QUESTIONS 1-9: 0
1. LITTLE INTEREST OR PLEASURE IN DOING THINGS: NOT AT ALL

## 2025-03-21 ASSESSMENT — ENCOUNTER SYMPTOMS
COUGH: 0
SHORTNESS OF BREATH: 0
BLOOD IN STOOL: 0
RHINORRHEA: 0
CONSTIPATION: 0
ABDOMINAL PAIN: 0
CHEST TIGHTNESS: 0

## 2025-03-21 NOTE — PROGRESS NOTES
Subjective:      Patient ID: Salma Feng is a 89 y.o. female.    HPI  Follow up on chronic medical problems.    Overall feeling well.    Cardiovascular Review:  The patient has hypertension, CARL (but has not been using the CPAP) and hyperlipidemia.  Denies chest pain or chest tightness.  SOB is at her usual.  No cough or congestion noted.  Has some good days and other days with feeling fatigue.  Has been having increase sweats at night over the past couple of months but since her last visit the sweats have stopped.    Diet and Lifestyle: generally follows a low fat low cholesterol diet, generally follows a low sodium diet, follows a diabetic diet regularly  Home BP Monitoring: is well controlled at home, ranging 130s's/70-80's.    Pertinent ROS: taking medications as instructed, no medication side effects noted, no TIA's, no chest pain on exertion, has had some mild swelling in the ankles.      Diabetes Mellitus:  She has diabetes mellitus.    Diabetic ROS - diabetic diet compliance: compliant most of the time, home glucose monitoring: is performed regularly, fasting values are still range low to mid 100s,  no low BS.    Further diabetic ROS: no polyuria or polydipsia, no chest pain, dyspnea or TIA's, no numbness, tingling or pain in extremities, no unusual visual symptoms, no hypoglycemia.  Her appetite is good.    Lab review: orders written for new lab studies as appropriate; see orders.    Chronic anemia and multiple myeloma in remission is being followed by hematology.  Has CT scan scheduled for f/u as per oncology for next week.      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 She had abd MRI w/ MRCP which showed Cystic lesion in the pancreas. Compared to a study from 2011, this has significantly increased in size. With interval development of diffuse pancreatic ductal dilatation. There is enhancing nodularity along the superior aspect of the lesion. Findings are concerning for a

## 2025-03-21 NOTE — PROGRESS NOTES
Chief Complaint   Patient presents with    3 Month Follow-Up     Patient is here today for 3 month follow up for Left foot pain    Hypertension       \"Have you been to the ER, urgent care clinic since your last visit?  Hospitalized since your last visit?\"    NO    “Have you seen or consulted any other health care providers outside of [facility] since your last visit?”    NO            Click Here for Release of Records Request       There were no vitals filed for this visit.   There are no preventive care reminders to display for this patient.     The patient, Salma Feng, identity was verified by name and .   .

## 2025-03-27 ENCOUNTER — RESULTS FOLLOW-UP (OUTPATIENT)
Age: 89
End: 2025-03-27

## 2025-04-08 RX ORDER — CLONIDINE HYDROCHLORIDE 0.3 MG/1
0.3 TABLET ORAL NIGHTLY
Qty: 90 TABLET | Refills: 3 | Status: SHIPPED | OUTPATIENT
Start: 2025-04-08

## 2025-04-08 RX ORDER — MINOXIDIL 10 MG/1
5 TABLET ORAL DAILY
Qty: 45 TABLET | Refills: 3 | Status: SHIPPED | OUTPATIENT
Start: 2025-04-08

## 2025-04-08 NOTE — TELEPHONE ENCOUNTER
Last appointment: 3/21/25  Next appointment: 7/23/25  Previous refill encounter(s): 2/23/24    Requested Prescriptions     Pending Prescriptions Disp Refills    minoxidil (LONITEN) 10 MG tablet [Pharmacy Med Name: MINOXIDIL  10MG  TAB] 45 tablet 3     Sig: TAKE ONE-HALF TABLET BY MOUTH  DAILY    cloNIDine (CATAPRES) 0.3 MG tablet [Pharmacy Med Name: CLONIDINE  0.3MG  TAB] 90 tablet 3     Sig: TAKE 1 TABLET BY MOUTH AT NIGHT         For Pharmacy Admin Tracking Only    Program: Medication Refill  CPA in place:    Recommendation Provided To:   Intervention Detail: New Rx: 2, reason: Patient Preference  Intervention Accepted By:   Gap Closed?:    Time Spent (min): 5

## 2025-05-02 RX ORDER — CHLORTHALIDONE 50 MG/1
50 TABLET ORAL DAILY
Qty: 30 TABLET | Refills: 0 | Status: SHIPPED | OUTPATIENT
Start: 2025-05-02

## 2025-05-02 NOTE — TELEPHONE ENCOUNTER
Last appointment: 3/21/25  Next appointment: 7/23/25  Previous refill encounter(s): 6/28/24    Requested Prescriptions     Pending Prescriptions Disp Refills    chlorthalidone (HYGROTEN) 50 MG tablet [Pharmacy Med Name: CHLORTHALIDONE  50MG  TAB] 30 tablet 0     Sig: TAKE 1 TABLET BY MOUTH DAILY         For Pharmacy Admin Tracking Only    Program: Medication Refill  CPA in place:    Recommendation Provided To:   Intervention Detail: New Rx: 1, reason: Patient Preference  Intervention Accepted By:   Gap Closed?:    Time Spent (min): 5

## 2025-05-06 DIAGNOSIS — E11.21 TYPE 2 DIABETES MELLITUS WITH DIABETIC NEPHROPATHY, WITHOUT LONG-TERM CURRENT USE OF INSULIN (HCC): ICD-10-CM

## 2025-05-07 RX ORDER — SITAGLIPTIN 25 MG/1
TABLET, FILM COATED ORAL
Qty: 90 TABLET | Refills: 3 | Status: SHIPPED | OUTPATIENT
Start: 2025-05-07

## 2025-05-07 NOTE — TELEPHONE ENCOUNTER
Last appointment: 3/21/25  Next appointment: 7/23/25  Previous refill encounter(s): 3/26/24    Requested Prescriptions     Pending Prescriptions Disp Refills    JANUVIA 25 MG tablet [Pharmacy Med Name: Januvia 25 MG Oral Tablet] 90 tablet 3     Sig: TAKE 1 TABLET BY MOUTH DAILY FOR TYPE 2 DIABETES MELLITUS         For Pharmacy Admin Tracking Only    Program: Medication Refill  CPA in place:    Recommendation Provided To:   Intervention Detail: New Rx: 1, reason: Patient Preference  Intervention Accepted By:   Gap Closed?:    Time Spent (min): 5

## 2025-05-22 ENCOUNTER — CLINICAL DOCUMENTATION (OUTPATIENT)
Age: 89
End: 2025-05-22

## 2025-05-27 RX ORDER — CHLORTHALIDONE 50 MG/1
50 TABLET ORAL DAILY
Qty: 90 TABLET | Refills: 3 | Status: SHIPPED | OUTPATIENT
Start: 2025-05-27

## 2025-05-27 NOTE — TELEPHONE ENCOUNTER
Last appointment: 3/21/25  Next appointment: 7/23/25  Previous refill encounter(s): 5/2/25 #30    Requested Prescriptions     Pending Prescriptions Disp Refills    chlorthalidone (HYGROTEN) 50 MG tablet [Pharmacy Med Name: CHLORTHALIDONE  50MG  TAB] 90 tablet 3     Sig: TAKE 1 TABLET BY MOUTH DAILY         For Pharmacy Admin Tracking Only    Program: Medication Refill  CPA in place:    Recommendation Provided To:   Intervention Detail: New Rx: 1, reason: Patient Preference  Intervention Accepted By:   Gap Closed?:    Time Spent (min): 5

## 2025-06-11 ENCOUNTER — HOSPITAL ENCOUNTER (OUTPATIENT)
Facility: HOSPITAL | Age: 89
Discharge: HOME | End: 2025-06-14
Attending: INTERNAL MEDICINE
Payer: MEDICARE

## 2025-06-11 DIAGNOSIS — C25.1 MALIGNANT NEOPLASM OF BODY OF PANCREAS (HCC): ICD-10-CM

## 2025-06-11 DIAGNOSIS — D50.9 IRON DEFICIENCY ANEMIA, UNSPECIFIED IRON DEFICIENCY ANEMIA TYPE: ICD-10-CM

## 2025-06-11 DIAGNOSIS — C90.00 MULTIPLE MYELOMA NOT HAVING ACHIEVED REMISSION (HCC): ICD-10-CM

## 2025-06-11 DIAGNOSIS — E87.5 HYPERKALEMIA: ICD-10-CM

## 2025-06-11 DIAGNOSIS — R91.8 OTHER NONSPECIFIC ABNORMAL FINDING OF LUNG FIELD: ICD-10-CM

## 2025-06-11 PROCEDURE — 74176 CT ABD & PELVIS W/O CONTRAST: CPT

## 2025-06-11 RX ORDER — IOPAMIDOL 755 MG/ML
100 INJECTION, SOLUTION INTRAVASCULAR
Status: DISCONTINUED | OUTPATIENT
Start: 2025-06-11 | End: 2025-06-11

## (undated) DEVICE — SOLUTION IRRIG 1000ML STRL H2O USP PLAS POUR BTL

## (undated) DEVICE — BLOCK BITE ENDOSCP AD 21 MM W/ DIL BLU LF DISP

## (undated) DEVICE — YANKAUER,TAPERED BULBOUS TIP,W/O VENT: Brand: MEDLINE

## (undated) DEVICE — IV STRT KT 3282] LSL INDUSTRIES INC]

## (undated) DEVICE — CATHETER IV 20GA L1.25IN FEP STR HUB TEF INTROCAN SFTY

## (undated) DEVICE — BAG SPEC BIOHZRD 10 X 10 IN --

## (undated) DEVICE — CUFF BLD PRSS AD CLTH SGL TB W/ BAYNT CONN ROUNDED CORNER

## (undated) DEVICE — SET ADMIN 16ML TBNG L100IN 2 Y INJ SITE IV PIGGY BK DISP

## (undated) DEVICE — CATHETER IV 20 GAX1 IN N WNG KINK RESIST INSYT AUTOGRD

## (undated) DEVICE — SENSOR PLSE OXMTR NEO AD 40KG ADH DISP NELLCOR

## (undated) DEVICE — Device: Brand: BALLOON3

## (undated) DEVICE — SYRINGE MED 3ML CLR PLAS STD N CTRL LUERLOCK TIP DISP

## (undated) DEVICE — Device

## (undated) DEVICE — FORCEPS BX L240CM JAW DIA2.8MM L CAP W/ NDL MIC MESH TOOTH

## (undated) DEVICE — ENDO CARRY-ON PROCEDURE KIT INCLUDES ENZYMATIC SPONGE, GAUZE, BIOHAZARD LABEL, TRAY, LUBRICANT, DIRTY SCOPE LABEL, WATER LABEL, TRAY, DRAWSTRING PAD, AND DEFENDO 4-PIECE KIT.: Brand: ENDO CARRY-ON PROCEDURE KIT

## (undated) DEVICE — HYPODERMIC SAFETY NEEDLE: Brand: MAGELLAN

## (undated) DEVICE — NEONATAL-ADULT SPO2 SENSOR: Brand: NELLCOR

## (undated) DEVICE — BASIN EMSIS 16OZ GRAPHITE PLAS KID SHP MOLD GRAD FOR ORAL

## (undated) DEVICE — BLOCK BITE ENDO W/ STRP SLD DISP

## (undated) DEVICE — Z DISCONTINUED PER MEDLINE LINE GAS SAMPLING O2/CO2 LNG AD 13 FT NSL W/ TBNG FILTERLINE

## (undated) DEVICE — Device: Brand: SINGLE USE ASPIRATION NEEDLE

## (undated) DEVICE — ELECTRODE,RADIOTRANSLUCENT,FOAM,5PK: Brand: MEDLINE

## (undated) DEVICE — SYSTEM BX DIA22GA FN W/ PRE LD NDL SHARKCORE

## (undated) DEVICE — CONTINU-FLO SOLUTION SET, 2 INJECTION SITES, MALE LUER LOCK ADAPTER WITH RETRACTABLE COLLAR, LARGE BORE STOPCOCK WITH ROTATING MALE LUER LOCK EXTENSION SET, 2 INJECTION SITES, MALE LUER LOCK ADAPTER WITH RETRACTABLE COLLAR: Brand: INTERLINK/CONTINU-FLO

## (undated) DEVICE — KENDALL DL ECG CABLE AND LEAD WIRE SYSTEM, 3-LEAD, SINGLE PATIENT USE: Brand: KENDALL

## (undated) DEVICE — SOLIDIFIER FLD 2OZ 1500CC N DISINF IN BTL DISP SAFESORB

## (undated) DEVICE — SOLUTION LACTATED RINGERS INJECTION USP

## (undated) DEVICE — Device: Brand: JELCO

## (undated) DEVICE — SYR 3ML LL TIP 1/10ML GRAD --

## (undated) DEVICE — SET ADMIN 16ML TBNG L100IN 2 Y INJ SITE IV PIGGY BK DISP (ORDER IN MULIPLES OF 48)

## (undated) DEVICE — 1200 GUARD II KIT W/5MM TUBE W/O VAC TUBE: Brand: GUARDIAN

## (undated) DEVICE — SYRINGE MED 10ML LUERLOCK TIP W/O SFTY DISP

## (undated) DEVICE — TOWEL 4 PLY TISS 19X30 SUE WHT

## (undated) DEVICE — SOLIDIFIER MEDC 1200ML -- CONVERT TO 356117

## (undated) DEVICE — FORCEPS BX L160CM DIA8MM GRSP DISECT CUP TIP NONLOCKING ROT

## (undated) DEVICE — CATH IV AUTOGRD BC PNK 20GA 25 -- INSYTE

## (undated) DEVICE — SYR ASSEMB INFL BLLN 60ML --

## (undated) DEVICE — SYR 10ML LUER LOK 1/5ML GRAD --

## (undated) DEVICE — 3M™ TEGADERM™ TRANSPARENT FILM DRESSING FRAME STYLE, 1624W, 2-3/8 IN X 2-3/4 IN (6 CM X 7 CM), 100/CT 4CT/CASE: Brand: 3M™ TEGADERM™

## (undated) DEVICE — KIT,1200CC RIGID CANISTER,6' AND 20" TUB: Brand: MEDLINE INDUSTRIES, INC.

## (undated) DEVICE — CONTAINER SPEC 20 ML LID NEUT BUFF FORMALIN 10 % POLYPR STS